# Patient Record
Sex: MALE | Race: WHITE | Employment: OTHER | URBAN - METROPOLITAN AREA
[De-identification: names, ages, dates, MRNs, and addresses within clinical notes are randomized per-mention and may not be internally consistent; named-entity substitution may affect disease eponyms.]

---

## 2017-01-03 ENCOUNTER — LAB ENCOUNTER (OUTPATIENT)
Dept: LAB | Age: 72
End: 2017-01-03
Attending: FAMILY MEDICINE

## 2017-01-03 ENCOUNTER — PRIOR ORIGINAL RECORDS (OUTPATIENT)
Dept: OTHER | Age: 72
End: 2017-01-03

## 2017-01-03 DIAGNOSIS — R73.03 PREDIABETES: ICD-10-CM

## 2017-01-03 DIAGNOSIS — F51.01 PRIMARY INSOMNIA: ICD-10-CM

## 2017-01-03 DIAGNOSIS — R53.82 CHRONIC FATIGUE SYNDROME: ICD-10-CM

## 2017-01-03 DIAGNOSIS — E78.5 HYPERLIPIDEMIA, UNSPECIFIED HYPERLIPIDEMIA TYPE: ICD-10-CM

## 2017-01-03 DIAGNOSIS — M06.9 RHEUMATOID ARTHRITIS, INVOLVING UNSPECIFIED SITE, UNSPECIFIED RHEUMATOID FACTOR PRESENCE: ICD-10-CM

## 2017-01-03 PROBLEM — N40.0 ENLARGED PROSTATE: Status: RESOLVED | Noted: 2017-01-03 | Resolved: 2017-01-03

## 2017-01-03 PROBLEM — G93.32 CHRONIC FATIGUE SYNDROME: Status: ACTIVE | Noted: 2017-01-03

## 2017-01-03 PROBLEM — K58.9 IBS (IRRITABLE BOWEL SYNDROME): Status: ACTIVE | Noted: 2017-01-03

## 2017-01-03 PROBLEM — N40.0 ENLARGED PROSTATE: Status: ACTIVE | Noted: 2017-01-03

## 2017-01-03 PROBLEM — G47.00 INSOMNIA: Status: ACTIVE | Noted: 2017-01-03

## 2017-01-03 PROBLEM — F43.10 PTSD (POST-TRAUMATIC STRESS DISORDER): Status: ACTIVE | Noted: 2017-01-03

## 2017-01-03 PROCEDURE — 80053 COMPREHEN METABOLIC PANEL: CPT

## 2017-01-03 PROCEDURE — 84443 ASSAY THYROID STIM HORMONE: CPT

## 2017-01-03 PROCEDURE — 83721 ASSAY OF BLOOD LIPOPROTEIN: CPT

## 2017-01-03 PROCEDURE — 85025 COMPLETE CBC W/AUTO DIFF WBC: CPT

## 2017-01-03 PROCEDURE — 36415 COLL VENOUS BLD VENIPUNCTURE: CPT

## 2017-01-03 PROCEDURE — 80061 LIPID PANEL: CPT

## 2017-01-03 PROCEDURE — 83036 HEMOGLOBIN GLYCOSYLATED A1C: CPT

## 2017-01-03 NOTE — PROGRESS NOTES
Patient presents with:  Establish Care: Pt just moved into the area and needs new PCP. HPI:   Gopi Sharpe is a 66year old male who presents to the office to establish care. He is a new patient, new to the area (from [facility]).   Was last seen knows he does not have this. HTN - was told he had this, but as far as patient aware, the BP is normal.  Ran out of the BP medicines about 2 weeks ago - (losartan-HCTZ).   Now BP normal, though high normal.  In past when seeing specialists, recalls his BP MANAGEMENT   • Injection, w/wo contrast, dx/therapeutic substance, epidural/subarachnoid; lumbar/sacral  3/11/2014     Procedure: LUMBAR EPIDURAL;  Surgeon: Maribell Rodriguez MD;  Location: [facility] PAIN MANAGEMENT   • Fluor gijayne & brad ndl/cath spi d Seat Belt Yes     Social History Narrative       , lives with wife. 4 adult children - 2 boys 2 girls.    REVIEW OF SYSTEMS:   Constitutional: positive for fatigue  Eyes: negative  Ears, nose, mouth, throat, and face: negative  Respiratory: negat Irritable bowel syndrome with both constipation and diarrhea  On and off. Daily takes Miramax, if symptoms accelerate, takes linzess but this causes diarrhea.     Was seeing Mohsen Begun but no longer wants to see  Given referral for GI  - Gastro Referral - In Net (eight) hours as needed for Pain. Dispense: 90 tablet; Refill: 1  - CBC W Differential W Platelet [E]; Future    9. Prediabetes  Mentioned in the past.  Possible DM? Get Z9N  - Comp Metabolic Panel [E]; Future  - Hemoglobin A1C [E];  Future      Berdine Adam

## 2017-01-04 LAB
ALBUMIN SERPL-MCNC: 4.1 G/DL (ref 3.5–4.8)
ALP LIVER SERPL-CCNC: 86 U/L (ref 45–117)
ALT SERPL-CCNC: 135 U/L (ref 17–63)
AST SERPL-CCNC: 66 U/L (ref 15–41)
BASOPHILS # BLD AUTO: 0.05 X10(3) UL (ref 0–0.1)
BASOPHILS NFR BLD AUTO: 0.9 %
BILIRUB SERPL-MCNC: 0.6 MG/DL (ref 0.1–2)
BUN BLD-MCNC: 14 MG/DL (ref 8–20)
CALCIUM BLD-MCNC: 9.6 MG/DL (ref 8.3–10.3)
CHLORIDE: 107 MMOL/L (ref 101–111)
CHOLEST SMN-MCNC: 191 MG/DL (ref ?–200)
CO2: 28 MMOL/L (ref 22–32)
CREAT BLD-MCNC: 0.97 MG/DL (ref 0.7–1.3)
EOSINOPHIL # BLD AUTO: 0.09 X10(3) UL (ref 0–0.3)
EOSINOPHIL NFR BLD AUTO: 1.7 %
ERYTHROCYTE [DISTWIDTH] IN BLOOD BY AUTOMATED COUNT: 12.5 % (ref 11.5–16)
EST. AVERAGE GLUCOSE BLD GHB EST-MCNC: 105 MG/DL (ref 68–126)
GLUCOSE BLD-MCNC: 111 MG/DL (ref 70–99)
HBA1C MFR BLD HPLC: 5.3 % (ref ?–5.7)
HCT VFR BLD AUTO: 46.4 % (ref 37–53)
HDLC SERPL-MCNC: 33 MG/DL (ref 45–?)
HDLC SERPL: 5.79 {RATIO} (ref ?–4.97)
HGB BLD-MCNC: 16.2 G/DL (ref 13–17)
IMMATURE GRANULOCYTE COUNT: 0.02 X10(3) UL (ref 0–1)
IMMATURE GRANULOCYTE RATIO %: 0.4 %
LDLC SERPL DIRECT ASSAY-MCNC: 97 MG/DL (ref ?–100)
LYMPHOCYTES # BLD AUTO: 1.59 X10(3) UL (ref 0.9–4)
LYMPHOCYTES NFR BLD AUTO: 29.9 %
M PROTEIN MFR SERPL ELPH: 6.9 G/DL (ref 6.1–8.3)
MCH RBC QN AUTO: 34.6 PG (ref 27–33.2)
MCHC RBC AUTO-ENTMCNC: 34.9 G/DL (ref 31–37)
MCV RBC AUTO: 99.1 FL (ref 80–99)
MONOCYTES # BLD AUTO: 0.48 X10(3) UL (ref 0.1–0.6)
MONOCYTES NFR BLD AUTO: 9 %
NEUTROPHIL ABS PRELIM: 3.08 X10 (3) UL (ref 1.3–6.7)
NEUTROPHILS # BLD AUTO: 3.08 X10(3) UL (ref 1.3–6.7)
NEUTROPHILS NFR BLD AUTO: 58.1 %
NONHDLC SERPL-MCNC: 158 MG/DL (ref ?–130)
PLATELET # BLD AUTO: 165 10(3)UL (ref 150–450)
POTASSIUM SERPL-SCNC: 3.9 MMOL/L (ref 3.6–5.1)
RBC # BLD AUTO: 4.68 X10(6)UL (ref 3.8–5.8)
RED CELL DISTRIBUTION WIDTH-SD: 45.1 FL (ref 35.1–46.3)
SODIUM SERPL-SCNC: 142 MMOL/L (ref 136–144)
TRIGLYCERIDES: 443 MG/DL (ref ?–150)
TSI SER-ACNC: 1.48 MIU/ML (ref 0.35–5.5)
WBC # BLD AUTO: 5.3 X10(3) UL (ref 4–13)

## 2017-01-16 ENCOUNTER — TELEPHONE (OUTPATIENT)
Dept: FAMILY MEDICINE CLINIC | Facility: CLINIC | Age: 72
End: 2017-01-16

## 2017-01-16 NOTE — TELEPHONE ENCOUNTER
NGAOM for patient to contact our office and complete the annual health assessment forms prior to his appointment with Dr. Kanwal Maravilla 1/20/17

## 2017-01-20 ENCOUNTER — OFFICE VISIT (OUTPATIENT)
Dept: FAMILY MEDICINE CLINIC | Facility: CLINIC | Age: 72
End: 2017-01-20

## 2017-01-20 ENCOUNTER — TELEPHONE (OUTPATIENT)
Dept: FAMILY MEDICINE CLINIC | Facility: CLINIC | Age: 72
End: 2017-01-20

## 2017-01-20 VITALS
SYSTOLIC BLOOD PRESSURE: 160 MMHG | BODY MASS INDEX: 39.97 KG/M2 | WEIGHT: 269.88 LBS | RESPIRATION RATE: 20 BRPM | HEIGHT: 69 IN | TEMPERATURE: 99 F | HEART RATE: 80 BPM | DIASTOLIC BLOOD PRESSURE: 78 MMHG

## 2017-01-20 DIAGNOSIS — F51.01 PRIMARY INSOMNIA: ICD-10-CM

## 2017-01-20 DIAGNOSIS — K58.2 IRRITABLE BOWEL SYNDROME WITH BOTH CONSTIPATION AND DIARRHEA: ICD-10-CM

## 2017-01-20 DIAGNOSIS — E66.01 SEVERE OBESITY (BMI 35.0-39.9): ICD-10-CM

## 2017-01-20 DIAGNOSIS — M48.061 LUMBAR FORAMINAL STENOSIS: ICD-10-CM

## 2017-01-20 DIAGNOSIS — M06.9 RHEUMATOID ARTHRITIS, INVOLVING UNSPECIFIED SITE, UNSPECIFIED RHEUMATOID FACTOR PRESENCE: ICD-10-CM

## 2017-01-20 DIAGNOSIS — F43.10 PTSD (POST-TRAUMATIC STRESS DISORDER): ICD-10-CM

## 2017-01-20 DIAGNOSIS — R73.03 PREDIABETES: ICD-10-CM

## 2017-01-20 DIAGNOSIS — R53.82 CHRONIC FATIGUE SYNDROME: ICD-10-CM

## 2017-01-20 DIAGNOSIS — Z00.00 ENCOUNTER FOR ANNUAL HEALTH EXAMINATION: Primary | ICD-10-CM

## 2017-01-20 DIAGNOSIS — E78.5 HYPERLIPIDEMIA, UNSPECIFIED HYPERLIPIDEMIA TYPE: ICD-10-CM

## 2017-01-20 DIAGNOSIS — Z98.1 S/P LUMBAR FUSION: ICD-10-CM

## 2017-01-20 PROCEDURE — 96160 PT-FOCUSED HLTH RISK ASSMT: CPT | Performed by: FAMILY MEDICINE

## 2017-01-20 NOTE — PATIENT INSTRUCTIONS
Hang Meza's SCREENING SCHEDULE   Tests on this list are recommended by your physician but may not be covered, or covered at this frequency, by your insurer. Please check with your insurance carrier before scheduling to verify coverage.     PREVEN Cancer Screening Covered up to Age 76     Colonoscopy Screen   Covered every 10 years- more often if abnormal Colonoscopy,10 Years due on 07/09/1993 Update Health Maintenance if applicable    Flex Sigmoidoscopy Screen  Covered every 5 years No results foun visit. This may be covered with your prescription benefits, but Medicare does not cover unless Medically needed    Zoster (Not covered by Medicare Part B) No orders found for this or any previous visit.  This may be covered with your pharmacy  prescription

## 2017-01-20 NOTE — PROGRESS NOTES
HPI:   Heidi Snow is a 66year old male who presents for a MA (Medicare Advantage) [facility] (Once per calendar year). PTSD - still a major problem, nightmare sand night terrors.   Referred to [facility], who likely called him but he could not TSH 1.480 01/03/2017   CREATSERUM 0.97 01/03/2017   * 01/03/2017        CBC  (most recent labs)     Lab Results  Component Value Date   WBC 5.3 01/03/2017   HGB 16.2 01/03/2017   .0 01/03/2017        ALLERGIES:   He has No Known Allergies. surgery (Right); tonsillectomy; and appendectomy.     His family history includes Arthritis in his brother; Dementia in his father; Diabetes in his mother; Heart Disorder in his brother and father; Hypertension in his brother; Lipids in his brother; Sydnee Moreira pupils equal and reactive, extraocular eye movements intact, wearing glasses  Nose - normal and patent, no erythema, discharge or polyps  Mouth - mucous membranes moist, pharynx normal without lesions  Neck - supple, no significant adenopathy  Chest - eric serve his needed. Referral placed. - Weight Loss [address] Location    4. Irritable bowel syndrome with both constipation and diarrhea  Related to moods, PTSD in my suspicion.     See psychiatrist  Also given a referral to see GI 1/3/17, but not int maintain positive mental well-being?: Social Interaction;Puzzles;Games; Visiting Friends    If you are a male age 38-65 or a female age 47-67, do you take aspirin?: Yes    Have you had any immunizations at another office such as Influenza, Hepatitis B, Teta week is this?: Correct    What month is it?: Correct    What year is it?: Correct    Recall \"Ball\": Correct    Recall \"Flag\": Correct    Recall \"Tree\": Correct       This section provided for quick review of chart, separate sheet to patient  Mary Anne Oseguera of Persistent     Medications (ACE/ARB, digoxin diuretics, anticonvulsants.)    Potassium  Annually POTASSIUM (mmol/L)   Date Value   01/03/2017 3.9    No flowsheet data found.     Creatinine  Annually CREATININE (mg/dL)   Date Value   01/03/2017 0.97    No

## 2017-01-23 ENCOUNTER — TELEPHONE (OUTPATIENT)
Dept: FAMILY MEDICINE CLINIC | Facility: CLINIC | Age: 72
End: 2017-01-23

## 2017-01-23 NOTE — TELEPHONE ENCOUNTER
Pt upset he had not heard of plan b for weight loss. i advised him that I have called the weight loss clinic and am waiting for their thoughts on what options we have.  He was able to schedule with [facility]

## 2017-01-23 NOTE — TELEPHONE ENCOUNTER
Please call patient regarding referrals that have not been done for one month. He said he is very ill and deserves a call from Dr. Festus Mayorga not a nurse. He only needs two minutes of his time. See previous notes.  He is very upset and will transfer provider

## 2017-01-26 ENCOUNTER — TELEPHONE (OUTPATIENT)
Dept: INTERNAL MEDICINE CLINIC | Facility: CLINIC | Age: 72
End: 2017-01-26

## 2017-02-06 ENCOUNTER — TELEPHONE (OUTPATIENT)
Dept: FAMILY MEDICINE CLINIC | Facility: CLINIC | Age: 72
End: 2017-02-06

## 2017-02-10 NOTE — TELEPHONE ENCOUNTER
Pt says he tried to follow through with referrals, but Evorn Baumgarten he went in for assessment and spent a whole day there talking with different providers. He states the end result was that they did not have a provider for him.     He is still upset about t

## 2017-02-10 NOTE — TELEPHONE ENCOUNTER
Reviewed below with patient. He states he is unable to get in touch with anyone from [facility].  Pt states he needs to see a psychiatrist. Bryon See pt as to whether he has contacted [facility] to get an appt with a psychiatrist and he states that is not what he needs

## 2017-04-17 ENCOUNTER — TELEPHONE (OUTPATIENT)
Dept: FAMILY MEDICINE CLINIC | Facility: CLINIC | Age: 72
End: 2017-04-17

## 2017-04-17 DIAGNOSIS — H35.9 RETINAL DISORDER: Primary | ICD-10-CM

## 2017-04-17 NOTE — TELEPHONE ENCOUNTER
Patient is calling in to check status on his refills he is out of the medication and wants to know the when it will be approved. I told him it was in pending status.

## 2017-04-17 NOTE — TELEPHONE ENCOUNTER
Patient needs a referral for Dr. Babs Kurtz for tomorrow 4/18/17. Address [address] [place] [address]. He said Dr. Homa Gonzalez needs to call it in. He also needs a referral for for Dr. Miguel Clifton for his Cataracts and Glaucoma for his appt.  With him on Wednesday 4/19/1

## 2017-04-17 NOTE — TELEPHONE ENCOUNTER
This was waiting for PCP to be attached to EPIC chart by [facility], Dr Rosendo Girard is now attached

## 2017-04-17 NOTE — TELEPHONE ENCOUNTER
----- Message -----   Caresse Bence From: Jian Hutchinson Sent: 4/13/2017   1:31 PM        To:  [facility] Clinical Staff, *   Subject: 2 Referral Request                               .Reason for the order/referral:Cataracts   PCP: Teodoro Lamb   Refer to Provider: Asha Saldivar

## 2017-04-18 RX ORDER — ATORVASTATIN CALCIUM 40 MG/1
TABLET, FILM COATED ORAL
Qty: 30 TABLET | Refills: 0 | Status: CANCELLED | OUTPATIENT
Start: 2017-04-18

## 2017-04-18 RX ORDER — ACETAMINOPHEN AND CODEINE PHOSPHATE 120; 12 MG/5ML; MG/5ML
SOLUTION ORAL
Qty: 30 CAPSULE | Refills: 0 | Status: CANCELLED | OUTPATIENT
Start: 2017-04-18

## 2017-04-18 RX ORDER — ALPRAZOLAM 1 MG/1
TABLET ORAL
Qty: 60 TABLET | Refills: 0 | Status: CANCELLED | OUTPATIENT
Start: 2017-04-18

## 2017-04-18 NOTE — TELEPHONE ENCOUNTER
Pt failed refill protocol because Liver enzymes elevated, so Atorvastatin in not suppose to be refilled. Also, Alprazolam and Flurazepam not on protocol. LOV 01/20/17 and labs 01/03/17. Please advise ? Routed to Dr Homa Gonzalez.

## 2017-04-19 NOTE — PATIENT INSTRUCTIONS
:    [address], [facility]  Licensed Clinical Professional Counselor  Child-Adult-Family Therapy    [place] [id number] [place], [place] [address]  Ph: [phone] ext [phone]  Fax: [phone]

## 2017-04-19 NOTE — PROGRESS NOTES
Patient presents with:  Referral  Medication Follow-Up     HPI:   Allyson Velazquez is a 66year old male who presents to the office for PTSD. The last time we met he was still hunting for someone who accepts his workers comp disability package.   Jackie Melendrez Naseem Varner was seen in the office today:  had concerns including Referral and Medication Follow-Up.     1. PTSD (post-traumatic stress disorder)  Ongoing PTSD  Need to find someone who will accept his disability from the [facility] and provide care to fill out forms Dispense: 30 tablet;  Refill: 5

## 2017-04-20 ENCOUNTER — TELEPHONE (OUTPATIENT)
Dept: INTERNAL MEDICINE CLINIC | Facility: CLINIC | Age: 72
End: 2017-04-20

## 2017-04-20 NOTE — TELEPHONE ENCOUNTER
Patient has a NP appt at the [facility] on 4/26/17 , chart has been reviewed by RN.

## 2017-04-26 ENCOUNTER — OFFICE VISIT (OUTPATIENT)
Dept: INTERNAL MEDICINE CLINIC | Facility: CLINIC | Age: 72
End: 2017-04-26

## 2017-04-26 ENCOUNTER — APPOINTMENT (OUTPATIENT)
Dept: LAB | Age: 72
End: 2017-04-26
Attending: NURSE PRACTITIONER
Payer: MEDICARE

## 2017-04-26 VITALS
SYSTOLIC BLOOD PRESSURE: 126 MMHG | HEIGHT: 69 IN | BODY MASS INDEX: 40.58 KG/M2 | HEART RATE: 80 BPM | RESPIRATION RATE: 16 BRPM | WEIGHT: 274 LBS | DIASTOLIC BLOOD PRESSURE: 82 MMHG

## 2017-04-26 DIAGNOSIS — G47.00 INSOMNIA, UNSPECIFIED TYPE: ICD-10-CM

## 2017-04-26 DIAGNOSIS — E66.01 MORBID OBESITY, UNSPECIFIED OBESITY TYPE (HCC): ICD-10-CM

## 2017-04-26 DIAGNOSIS — F43.10 PTSD (POST-TRAUMATIC STRESS DISORDER): ICD-10-CM

## 2017-04-26 DIAGNOSIS — Z51.81 THERAPEUTIC DRUG MONITORING: Primary | ICD-10-CM

## 2017-04-26 DIAGNOSIS — Z51.81 THERAPEUTIC DRUG MONITORING: ICD-10-CM

## 2017-04-26 DIAGNOSIS — G47.33 OSA (OBSTRUCTIVE SLEEP APNEA): ICD-10-CM

## 2017-04-26 PROCEDURE — 99213 OFFICE O/P EST LOW 20 MIN: CPT | Performed by: NURSE PRACTITIONER

## 2017-04-26 PROCEDURE — 82607 VITAMIN B-12: CPT

## 2017-04-26 PROCEDURE — 82306 VITAMIN D 25 HYDROXY: CPT

## 2017-04-26 PROCEDURE — 82397 CHEMILUMINESCENT ASSAY: CPT

## 2017-04-26 RX ORDER — TOPIRAMATE 25 MG/1
25 TABLET ORAL 2 TIMES DAILY
Qty: 60 TABLET | Refills: 0 | Status: SHIPPED | OUTPATIENT
Start: 2017-04-26 | End: 2017-06-20 | Stop reason: ALTCHOICE

## 2017-04-26 NOTE — PROGRESS NOTES
Lorna Camargo is a 66year old male new to our office today. Referred by Dr. Kevin GARCIA:  Patient presents today with c/o weight gain that started about 8 years ago after surgeries for orthopedic injuries which resulted in in activity.   Patient sees 69\"  Wt 274 lb  BMI 40.44 kg/m2  GENERAL: well developed, well nourished, in no apparent distress, morbidly obese  SKIN: warm, pink, dry without rashes to exposed area  EYES: conjunctiva pink, sclera non icteric, PERRLA  HEENT: atraumatic, normocephalic, nutrition, exercise and behavior/stress management for success. See patient instruction below for more details. Patient Instructions   Welcome to the [facility]. ..your Lifestyle Renovation begins now!   Thank you for placing your trust in control stress. Chronic stress can make weight loss difficult. Exercising is one way to help with stress, but meditation using the CALM Paul or another comparable alternative can be done in your home or place of work with little time commitment.     Don't f

## 2017-04-26 NOTE — PATIENT INSTRUCTIONS
Welcome to the [facility] Clinic. ..your Lifestyle Renovation begins now! Thank you for placing your trust in our health care team, I look forward to working with you along this journey to better health!     Next steps:     1.  Schedule a personal n another comparable alternative can be done in your home or place of work with little time commitment. Don't forget to schedule your 1 month follow-up visit!

## 2017-05-01 NOTE — TELEPHONE ENCOUNTER
Pt assigned to another provider Dr Allyn Lockett w/[facility]. Val Us LMOM informing him to contact Insurance and have it changed to our group

## 2017-05-01 NOTE — TELEPHONE ENCOUNTER
Message sent to [facility] Eligibility and pt is active w/Another group. .. not ours

## 2017-05-02 ENCOUNTER — TELEPHONE (OUTPATIENT)
Dept: INTERNAL MEDICINE CLINIC | Facility: CLINIC | Age: 72
End: 2017-05-02

## 2017-05-02 NOTE — TELEPHONE ENCOUNTER
There is not an Explanation of benefits from the insurance for the labs that are being billed from [facility] (see Account Inquiry). The patient should phone the Patient Billing Department at [phone] for further information.   Once it has been

## 2017-05-30 ENCOUNTER — TELEPHONE (OUTPATIENT)
Dept: INTERNAL MEDICINE CLINIC | Facility: CLINIC | Age: 72
End: 2017-05-30

## 2017-06-12 ENCOUNTER — TELEPHONE (OUTPATIENT)
Dept: FAMILY MEDICINE CLINIC | Facility: CLINIC | Age: 72
End: 2017-06-12

## 2017-06-12 NOTE — TELEPHONE ENCOUNTER
Pt requesting an appt for TODAY ONLY w/Dr Kandy Guzman (offered another provider) has Cold like symptoms Fever and nausea

## 2017-06-12 NOTE — TELEPHONE ENCOUNTER
Pt called to report that he made appt to see Dr Kandy Guzman for [facility] 06/14/17 for symptoms, but pt would like to be seen today if at all possible.  Pt has been sick for a few weeks with Bilateral ear pain, cough, sore throat, post nasal drip, sweats, nausea, and

## 2017-06-13 ENCOUNTER — OFFICE VISIT (OUTPATIENT)
Dept: FAMILY MEDICINE CLINIC | Facility: CLINIC | Age: 72
End: 2017-06-13

## 2017-06-13 VITALS
RESPIRATION RATE: 16 BRPM | SYSTOLIC BLOOD PRESSURE: 138 MMHG | BODY MASS INDEX: 40 KG/M2 | WEIGHT: 272 LBS | HEART RATE: 84 BPM | DIASTOLIC BLOOD PRESSURE: 76 MMHG | TEMPERATURE: 98 F

## 2017-06-13 DIAGNOSIS — G47.00 INSOMNIA, UNSPECIFIED TYPE: ICD-10-CM

## 2017-06-13 DIAGNOSIS — R05.9 COUGH: ICD-10-CM

## 2017-06-13 DIAGNOSIS — R61 EXCESSIVE SWEATING: ICD-10-CM

## 2017-06-13 DIAGNOSIS — E78.5 HYPERLIPIDEMIA, UNSPECIFIED HYPERLIPIDEMIA TYPE: ICD-10-CM

## 2017-06-13 DIAGNOSIS — R09.81 NASAL CONGESTION: Primary | ICD-10-CM

## 2017-06-13 DIAGNOSIS — F51.01 PRIMARY INSOMNIA: ICD-10-CM

## 2017-06-13 DIAGNOSIS — F43.10 PTSD (POST-TRAUMATIC STRESS DISORDER): ICD-10-CM

## 2017-06-13 PROCEDURE — 99213 OFFICE O/P EST LOW 20 MIN: CPT | Performed by: FAMILY MEDICINE

## 2017-06-13 RX ORDER — ATORVASTATIN CALCIUM 40 MG/1
40 TABLET, FILM COATED ORAL NIGHTLY
Qty: 30 TABLET | Refills: 5 | Status: SHIPPED | OUTPATIENT
Start: 2017-06-13 | End: 2017-10-06

## 2017-06-13 RX ORDER — ALPRAZOLAM 1 MG/1
1 TABLET ORAL 2 TIMES DAILY PRN
Qty: 40 TABLET | Refills: 2 | Status: SHIPPED | OUTPATIENT
Start: 2017-06-13 | End: 2017-10-06

## 2017-06-13 RX ORDER — AZITHROMYCIN 250 MG/1
TABLET, FILM COATED ORAL
Qty: 6 TABLET | Refills: 0 | Status: SHIPPED | OUTPATIENT
Start: 2017-06-13 | End: 2017-06-20 | Stop reason: ALTCHOICE

## 2017-06-13 RX ORDER — ACETAMINOPHEN AND CODEINE PHOSPHATE 120; 12 MG/5ML; MG/5ML
30 SOLUTION ORAL NIGHTLY PRN
Qty: 30 CAPSULE | Refills: 2 | Status: SHIPPED | OUTPATIENT
Start: 2017-06-13 | End: 2017-10-06

## 2017-06-13 NOTE — PROGRESS NOTES
Patient presents with:  Cold: x3 weeks w/ on/off nasal congestion, feeling ears are clogged and feels like he has a fever. HPI:   Laney Reyes is a 66year old male with PTSD, lumbar issues who presents to the office for cold symptoms.      Weigh tablets by mouth today, then one daily. Dispense: 6 tablet; Refill: 0    2. Cough  As above  - azithromycin 250 MG Oral Tab; Take two tablets by mouth today, then one daily. Dispense: 6 tablet; Refill: 0    3.  Excessive sweating  As above  - azithromycin

## 2017-06-20 ENCOUNTER — OFFICE VISIT (OUTPATIENT)
Dept: FAMILY MEDICINE CLINIC | Facility: CLINIC | Age: 72
End: 2017-06-20

## 2017-06-20 VITALS
HEART RATE: 90 BPM | TEMPERATURE: 99 F | RESPIRATION RATE: 18 BRPM | BODY MASS INDEX: 40 KG/M2 | DIASTOLIC BLOOD PRESSURE: 76 MMHG | SYSTOLIC BLOOD PRESSURE: 128 MMHG | WEIGHT: 270 LBS

## 2017-06-20 DIAGNOSIS — R05.9 COUGH: Primary | ICD-10-CM

## 2017-06-20 DIAGNOSIS — R09.81 NASAL CONGESTION: ICD-10-CM

## 2017-06-20 PROCEDURE — 99213 OFFICE O/P EST LOW 20 MIN: CPT | Performed by: FAMILY MEDICINE

## 2017-06-20 NOTE — PROGRESS NOTES
Patient presents with:  Sore Throat: Pt states still having sore throat ear pain, and congestion. HPI:   Chela Steven is a 66year old male who presents to the office for pain in throat, cough. Persisting. We saw each other a week ago.   Given

## 2017-08-29 ENCOUNTER — OFFICE VISIT (OUTPATIENT)
Dept: FAMILY MEDICINE CLINIC | Facility: CLINIC | Age: 72
End: 2017-08-29

## 2017-08-29 VITALS
DIASTOLIC BLOOD PRESSURE: 84 MMHG | RESPIRATION RATE: 18 BRPM | BODY MASS INDEX: 41 KG/M2 | HEART RATE: 82 BPM | TEMPERATURE: 98 F | SYSTOLIC BLOOD PRESSURE: 144 MMHG | WEIGHT: 279 LBS

## 2017-08-29 DIAGNOSIS — G47.00 INSOMNIA, UNSPECIFIED TYPE: ICD-10-CM

## 2017-08-29 DIAGNOSIS — F43.10 PTSD (POST-TRAUMATIC STRESS DISORDER): ICD-10-CM

## 2017-08-29 DIAGNOSIS — R09.81 NASAL CONGESTION: ICD-10-CM

## 2017-08-29 DIAGNOSIS — E66.01 MORBID OBESITY WITH BMI OF 40.0-44.9, ADULT (HCC): Primary | ICD-10-CM

## 2017-08-29 PROCEDURE — 99214 OFFICE O/P EST MOD 30 MIN: CPT | Performed by: FAMILY MEDICINE

## 2017-08-29 NOTE — PROGRESS NOTES
Patient presents with:  Weight Loss: Pt would like to know about weight loss clinic  Nasal Congestion: Pt states he is still having congestion in left ear   Colonoscopy Screening: pt is due     HPI:   Katrin Keys is a 74year old male who presents t clubbing or cyanosis  Skin - normal coloration and turgor, no rashes, no suspicious skin lesions noted  No rash seen on back of head.   ASSESSMENT AND PLAN:     Randa Severance was seen in the office today:  had concerns including Weight Loss (Pt would l

## 2017-08-30 ENCOUNTER — TELEPHONE (OUTPATIENT)
Dept: FAMILY MEDICINE CLINIC | Facility: CLINIC | Age: 72
End: 2017-08-30

## 2017-08-30 NOTE — TELEPHONE ENCOUNTER
Called and LMOM at Dr Piedad Griffin office to inquire if they medically manage weight loss for patient's per Dr Vane Gaston request

## 2017-08-30 NOTE — TELEPHONE ENCOUNTER
Called Dr Valentino Poag office and spoke to [place], a Consultation Appt was scheduled for Nov. 2nd at 10:15am, Called patient and left message on phone machine with appt details  Referral has been entered by Dr Hannah Holt, Clinical information was added by me today.

## 2017-10-05 ENCOUNTER — TELEPHONE (OUTPATIENT)
Dept: FAMILY MEDICINE CLINIC | Facility: CLINIC | Age: 72
End: 2017-10-05

## 2017-10-05 NOTE — TELEPHONE ENCOUNTER
Has had this pain in Left side of abdomin no N/V/D has long standing HX of constipation Last BM was 2 days ago feels he needs to have BM will only see Dr Teodoro Lamb made an appointment for 14:45 10/6/17 with Dr Teodoro Lamb

## 2017-10-05 NOTE — TELEPHONE ENCOUNTER
Pt has a sharp pain for about 3 days in the lower left side of abdomen he only wants to see Dr Fredo Silva and he cant come tomorrow morning

## 2017-10-06 ENCOUNTER — HOSPITAL ENCOUNTER (OUTPATIENT)
Dept: CT IMAGING | Facility: HOSPITAL | Age: 72
Discharge: HOME | End: 2017-10-06
Attending: FAMILY MEDICINE
Payer: MEDICARE

## 2017-10-06 ENCOUNTER — OFFICE VISIT (OUTPATIENT)
Dept: FAMILY MEDICINE CLINIC | Facility: CLINIC | Age: 72
End: 2017-10-06

## 2017-10-06 VITALS
SYSTOLIC BLOOD PRESSURE: 136 MMHG | RESPIRATION RATE: 16 BRPM | BODY MASS INDEX: 39.87 KG/M2 | HEIGHT: 69.5 IN | WEIGHT: 275.38 LBS | OXYGEN SATURATION: 94 % | TEMPERATURE: 98 F | DIASTOLIC BLOOD PRESSURE: 82 MMHG | HEART RATE: 82 BPM

## 2017-10-06 DIAGNOSIS — R06.00 DYSPNEA ON EXERTION: ICD-10-CM

## 2017-10-06 DIAGNOSIS — K57.32 DIVERTICULITIS OF LARGE INTESTINE WITHOUT PERFORATION OR ABSCESS WITHOUT BLEEDING: ICD-10-CM

## 2017-10-06 DIAGNOSIS — R07.9 CHEST PAIN, UNSPECIFIED TYPE: ICD-10-CM

## 2017-10-06 DIAGNOSIS — H92.01 EAR PAIN, RIGHT: ICD-10-CM

## 2017-10-06 DIAGNOSIS — R09.02 HYPOXIA: ICD-10-CM

## 2017-10-06 DIAGNOSIS — I70.0 THORACIC AORTA ATHEROSCLEROSIS (HCC): ICD-10-CM

## 2017-10-06 DIAGNOSIS — F43.10 PTSD (POST-TRAUMATIC STRESS DISORDER): ICD-10-CM

## 2017-10-06 DIAGNOSIS — R10.32 ABDOMINAL PAIN, ACUTE, LEFT LOWER QUADRANT: ICD-10-CM

## 2017-10-06 DIAGNOSIS — L73.8 FOLLICULITIS BARBAE: ICD-10-CM

## 2017-10-06 DIAGNOSIS — R10.32 ABDOMINAL PAIN, ACUTE, LEFT LOWER QUADRANT: Primary | ICD-10-CM

## 2017-10-06 DIAGNOSIS — E78.5 HYPERLIPIDEMIA, UNSPECIFIED HYPERLIPIDEMIA TYPE: ICD-10-CM

## 2017-10-06 DIAGNOSIS — I77.811 ECTATIC ABDOMINAL AORTA (HCC): ICD-10-CM

## 2017-10-06 DIAGNOSIS — F51.01 PRIMARY INSOMNIA: ICD-10-CM

## 2017-10-06 DIAGNOSIS — E66.01 MORBID OBESITY WITH BMI OF 40.0-44.9, ADULT (HCC): ICD-10-CM

## 2017-10-06 DIAGNOSIS — R61 EXCESSIVE SWEATING: ICD-10-CM

## 2017-10-06 DIAGNOSIS — R91.8 PULMONARY NODULES: ICD-10-CM

## 2017-10-06 PROCEDURE — 71260 CT THORAX DX C+: CPT | Performed by: FAMILY MEDICINE

## 2017-10-06 PROCEDURE — 74177 CT ABD & PELVIS W/CONTRAST: CPT | Performed by: FAMILY MEDICINE

## 2017-10-06 PROCEDURE — 99215 OFFICE O/P EST HI 40 MIN: CPT | Performed by: FAMILY MEDICINE

## 2017-10-06 PROCEDURE — 93000 ELECTROCARDIOGRAM COMPLETE: CPT | Performed by: FAMILY MEDICINE

## 2017-10-06 RX ORDER — FLUCONAZOLE 150 MG/1
150 TABLET ORAL DAILY
Qty: 7 TABLET | Refills: 0 | Status: SHIPPED | OUTPATIENT
Start: 2017-10-06 | End: 2017-10-13

## 2017-10-06 RX ORDER — ATORVASTATIN CALCIUM 40 MG/1
40 TABLET, FILM COATED ORAL NIGHTLY
Qty: 30 TABLET | Refills: 5 | Status: SHIPPED | OUTPATIENT
Start: 2017-10-06 | End: 2018-03-14

## 2017-10-06 RX ORDER — ALPRAZOLAM 1 MG/1
1 TABLET ORAL 2 TIMES DAILY PRN
Qty: 40 TABLET | Refills: 2 | Status: SHIPPED | OUTPATIENT
Start: 2017-10-06 | End: 2018-03-22

## 2017-10-06 RX ORDER — CIPROFLOXACIN 500 MG/1
500 TABLET, FILM COATED ORAL 2 TIMES DAILY
Qty: 14 TABLET | Refills: 0 | Status: SHIPPED | OUTPATIENT
Start: 2017-10-06 | End: 2017-10-13

## 2017-10-06 RX ORDER — METRONIDAZOLE 500 MG/1
500 TABLET ORAL 3 TIMES DAILY
Qty: 21 TABLET | Refills: 0 | Status: SHIPPED | OUTPATIENT
Start: 2017-10-06 | End: 2017-10-13

## 2017-10-06 RX ORDER — ACETAMINOPHEN AND CODEINE PHOSPHATE 120; 12 MG/5ML; MG/5ML
30 SOLUTION ORAL NIGHTLY PRN
Qty: 30 CAPSULE | Refills: 2 | Status: SHIPPED | OUTPATIENT
Start: 2017-10-06 | End: 2018-03-22

## 2017-10-06 RX ORDER — NEOMYCIN SULFATE, POLYMYXIN B SULFATE, HYDROCORTISONE 3.5; 10000; 1 MG/ML; [USP'U]/ML; MG/ML
4 SOLUTION/ DROPS AURICULAR (OTIC) 3 TIMES DAILY
Qty: 1 BOTTLE | Refills: 0 | Status: SHIPPED | OUTPATIENT
Start: 2017-10-06 | End: 2017-10-13

## 2017-10-06 NOTE — PROGRESS NOTES
Patient presents with:  Constipation: left side pain x 2 days      HPI:   Emery Boyer is a 74year old male who presents to the office for constipation. This has caused L side pain for the last 2 days. Not feeling well at all.     Since the last ti No current facility-administered medications on file prior to visit.      Allergies:  No Known Allergies    Past Surgical History:   Procedure Laterality Date   • Appendectomy      as child   • Back surgery      twice   • Fluor gid & loclzj ndl/cath sp 4.2 oz/week    7 Standard drinks or equivalent per week         Comment: 1 drink daily    Drug use: No    Sexual activity: Not on file     Other Topics Concern    Caffeine Concern Yes    Comment: weekly    Exercise No    Seat Belt Yes     Social History Na Normal axis and intervals. No ischemic changes.      Component      Latest Ref Rng & Units 4/26/2017 1/3/2017   WBC      4.0 - 13.0 x10(3) uL  5.3   RBC      3.80 - 5.80 x10(6)uL  4.68   Hemoglobin      13.0 - 17.0 g/dL  16.2   Hematocrit      37.0 - 53.0 ESTIMATED AVERAGE GLUCOSE      68 - 126 mg/dL  105   TSH      0.350 - 5.500 mIU/mL  1.480   DIRECT LDL      <100 mg/dL  97   LEPTIN      0.5 - 12.5 ng/mL 22.2 (H)    Vitamin B12      193 - 986 pg/mL 800    VITAMIN D, 25-HYDROXY      30.0 - 100.0 ng/mL 20 shape, triathlons, marathons. CT chest.   - CT CHEST+ABDOMEN (ALL CNTRST ONLY) (HIY=[id number]); Future    9. PTSD (post-traumatic stress disorder)  Long standing  Has been on treatment many years.   - ALPRAZolam 1 MG Oral Tab;  Take 1 tablet (1 mg tota point. Advised to f/u CT in 1 yr. Will recheck 2018. 14. Vascular  Atherosclerotic changes noted on the aorta in chest.  Also noted mild ectasia of the infrarenal abdominal aorta. Risk factor control - on statin.   BP controlled  Is working on [employer]

## 2017-10-12 ENCOUNTER — TELEPHONE (OUTPATIENT)
Dept: FAMILY MEDICINE CLINIC | Facility: CLINIC | Age: 72
End: 2017-10-12

## 2017-10-12 DIAGNOSIS — L73.8 FOLLICULITIS BARBAE: ICD-10-CM

## 2017-10-12 DIAGNOSIS — H92.01 EAR PAIN, RIGHT: Primary | ICD-10-CM

## 2017-10-12 NOTE — TELEPHONE ENCOUNTER
Patient would like to speak to Dr. Jennifer Fong regarding ongoing abdominal pan, rash, and ear pain. Patient is close to running out of meds and would like to know if he should get a refill or what to do. Please contact patient.

## 2017-10-13 RX ORDER — MUPIROCIN CALCIUM 20 MG/G
1 CREAM TOPICAL DAILY
Qty: 15 G | Refills: 1 | Status: SHIPPED | OUTPATIENT
Start: 2017-10-13 | End: 2017-10-20

## 2017-10-13 RX ORDER — NEOMYCIN SULFATE, POLYMYXIN B SULFATE, HYDROCORTISONE 3.5; 10000; 1 MG/ML; [USP'U]/ML; MG/ML
4 SOLUTION/ DROPS AURICULAR (OTIC) 3 TIMES DAILY
Qty: 1 BOTTLE | Refills: 0 | Status: SHIPPED | OUTPATIENT
Start: 2017-10-13 | End: 2018-01-02

## 2017-10-13 NOTE — TELEPHONE ENCOUNTER
Called patient. Despite CT findings and treatment, the abdominal pains persist, but has gotten a lot better. Has also been able to follow a more liquid diet. Ear - has improved, but out of ear drops. Will Rx more. Rash - on face.   Might be improved

## 2017-10-19 ENCOUNTER — MED REC SCAN ONLY (OUTPATIENT)
Dept: FAMILY MEDICINE CLINIC | Facility: CLINIC | Age: 72
End: 2017-10-19

## 2017-10-20 ENCOUNTER — OFFICE VISIT (OUTPATIENT)
Dept: FAMILY MEDICINE CLINIC | Facility: CLINIC | Age: 72
End: 2017-10-20

## 2017-10-20 VITALS
SYSTOLIC BLOOD PRESSURE: 136 MMHG | HEIGHT: 69.25 IN | HEART RATE: 60 BPM | WEIGHT: 272.38 LBS | DIASTOLIC BLOOD PRESSURE: 82 MMHG | RESPIRATION RATE: 16 BRPM | TEMPERATURE: 99 F | BODY MASS INDEX: 39.89 KG/M2

## 2017-10-20 DIAGNOSIS — IMO0001 LOW SEXUAL DESIRE DISORDER: ICD-10-CM

## 2017-10-20 DIAGNOSIS — R63.5 WEIGHT GAIN: ICD-10-CM

## 2017-10-20 DIAGNOSIS — R53.82 CHRONIC FATIGUE SYNDROME: Primary | ICD-10-CM

## 2017-10-20 DIAGNOSIS — R61 EXCESSIVE SWEATING: ICD-10-CM

## 2017-10-20 PROCEDURE — 99214 OFFICE O/P EST MOD 30 MIN: CPT | Performed by: FAMILY MEDICINE

## 2017-10-20 RX ORDER — FLURAZEPAM HCL 15 MG
CAPSULE ORAL
Refills: 0 | COMMUNITY
Start: 2017-08-28 | End: 2017-10-20 | Stop reason: ALTCHOICE

## 2017-10-20 RX ORDER — GABAPENTIN 300 MG/1
CAPSULE ORAL
Refills: 0 | COMMUNITY
Start: 2017-09-03 | End: 2018-01-02

## 2017-10-20 NOTE — PROGRESS NOTES
Patient presents with:  Bruising: injection site from 2 weeks ago also painful      HPI:   Antonieta Escalante is a 74year old male who presents to the office for f/u.  2 weeks ago seen for chills, sweat, LOMAX. CT ab and chest showed diverticulitis.   This fatigue syndrome  - TESTOSTERONE TOTAL  - PROLACTIN  - CBC, PLATELET; NO DIFFERENTIAL; Future    2.  Excessive sweating  - CORTISOL  - COMP METABOLIC PANEL (14)  - ALDOSTERONE, SERUM    3. Low sexual desire disorder  - TESTOSTERONE TOTAL  - ALDOSTERONE, SER

## 2017-10-24 ENCOUNTER — APPOINTMENT (OUTPATIENT)
Dept: LAB | Age: 72
End: 2017-10-24
Attending: FAMILY MEDICINE
Payer: MEDICARE

## 2017-10-24 ENCOUNTER — PRIOR ORIGINAL RECORDS (OUTPATIENT)
Dept: OTHER | Age: 72
End: 2017-10-24

## 2017-10-24 DIAGNOSIS — R53.82 CHRONIC FATIGUE SYNDROME: ICD-10-CM

## 2017-10-24 PROCEDURE — 82088 ASSAY OF ALDOSTERONE: CPT | Performed by: FAMILY MEDICINE

## 2017-10-24 PROCEDURE — 85027 COMPLETE CBC AUTOMATED: CPT

## 2017-11-07 ENCOUNTER — TELEPHONE (OUTPATIENT)
Dept: FAMILY MEDICINE CLINIC | Facility: CLINIC | Age: 72
End: 2017-11-07

## 2017-11-07 NOTE — TELEPHONE ENCOUNTER
He has known about the lab results for about a week and a half now. This is nothing acute. The liver levels are pretty consistent with where they have been the last few checks, a little higher.   I was under the impression there was an early Nov appt with

## 2017-11-07 NOTE — TELEPHONE ENCOUNTER
patient called for labs given results including elevated liver function, he is very concerned about weight gain and fatigue with inability to get out of bed for days increased sweating to the point that at night he needs to change the sheets he would like

## 2017-11-16 ENCOUNTER — TELEPHONE (OUTPATIENT)
Dept: FAMILY MEDICINE CLINIC | Facility: CLINIC | Age: 72
End: 2017-11-16

## 2017-11-16 DIAGNOSIS — E66.01 SEVERE OBESITY (BMI 35.0-39.9): Primary | ICD-10-CM

## 2017-11-16 NOTE — TELEPHONE ENCOUNTER
Patient called stating that he needs a new referral for weight loss clinic Dr. Maurice Alcocer. Per Dr. Kirsten Craig the referral is only good for 3 months. Patient apt is scheduled for December 6, 2017 new referral is needed. Order placed.

## 2017-12-13 ENCOUNTER — TELEPHONE (OUTPATIENT)
Dept: FAMILY MEDICINE CLINIC | Facility: CLINIC | Age: 72
End: 2017-12-13

## 2017-12-13 ENCOUNTER — TELEPHONE (OUTPATIENT)
Dept: SURGERY | Facility: CLINIC | Age: 72
End: 2017-12-13

## 2017-12-13 NOTE — TELEPHONE ENCOUNTER
Rigo La arrived for his appointment. When he was taken in to exam room by PCT, he asked that he speak to me before any history was taken or vitals were performed. I spoke with Rigo La who had many concerns regarding insurance coverage.  He questioned wh

## 2017-12-13 NOTE — TELEPHONE ENCOUNTER
Spoke with Patient via telephone to inform him that I spoke with Dr. Fredo Silva and a message was sent to [facility] asking her to call him to discuss insurance. Patient verbalized understanding.

## 2017-12-13 NOTE — TELEPHONE ENCOUNTER
Called and discussed with Leti Joya. The surgeons are not contracted with his insurance. He did see bariatric specialist nurse, but he was not interested in pursuing ongoing care with her.     Leti Joya will have her insurance contacts continue to research

## 2017-12-13 NOTE — TELEPHONE ENCOUNTER
Gaurav Koroma is a Nurse practioner calling from the [facility] office in regards to her recent visit with patient. Patient is wanting bariatric surgery, surgeons he is requesting are not contracted in his insurance.  Patient does not want to see her eithe

## 2017-12-14 ENCOUNTER — TELEPHONE (OUTPATIENT)
Dept: FAMILY MEDICINE CLINIC | Facility: CLINIC | Age: 72
End: 2017-12-14

## 2017-12-14 NOTE — TELEPHONE ENCOUNTER
The bariatric nurse contacted me. They are looking into other options within the network. He is interested in bariatric surgery, not weight loss management.

## 2017-12-15 ENCOUNTER — TELEPHONE (OUTPATIENT)
Dept: SURGERY | Facility: CLINIC | Age: 72
End: 2017-12-15

## 2017-12-15 NOTE — TELEPHONE ENCOUNTER
Per the request of Keaton Abreu, I called patient to discuss concerns regarding insurance covering APN visit and Bariatric surgeons covered by his plan. Left message on patient's voicemail to please call back at his convenience.

## 2017-12-18 ENCOUNTER — TELEPHONE (OUTPATIENT)
Dept: FAMILY MEDICINE CLINIC | Facility: CLINIC | Age: 72
End: 2017-12-18

## 2017-12-18 NOTE — TELEPHONE ENCOUNTER
Working on finding referral for patient in network. I am aware on the [facility] [facility] of in network MDs there is currently no bariatric surgeon.   A general surgeon who does bariatric Vy Ratna) is on the list, but he is not covered in terms of seguni

## 2017-12-20 NOTE — TELEPHONE ENCOUNTER
Spoke with Rozella Goodpasture, at [facility], she is aware that Dr Ashley Smith and Jarvis Alvarez are not in network and directed up to submit a prior authorization through [address]   I have accessed this site through [employer] and will submit a Prior Authori

## 2017-12-20 NOTE — TELEPHONE ENCOUNTER
[facility]  [facility]  [address] [facility]  Phone: [phone]  Fax: [phone]    Called office [facility] for Artemio Flores to call Martir Ogden

## 2018-01-02 ENCOUNTER — OFFICE VISIT (OUTPATIENT)
Dept: FAMILY MEDICINE CLINIC | Facility: CLINIC | Age: 73
End: 2018-01-02

## 2018-01-02 VITALS
WEIGHT: 280 LBS | BODY MASS INDEX: 40.09 KG/M2 | DIASTOLIC BLOOD PRESSURE: 80 MMHG | HEIGHT: 70 IN | TEMPERATURE: 98 F | SYSTOLIC BLOOD PRESSURE: 130 MMHG | HEART RATE: 84 BPM | RESPIRATION RATE: 16 BRPM

## 2018-01-02 DIAGNOSIS — E66.01 MORBID OBESITY WITH BMI OF 40.0-44.9, ADULT (HCC): Primary | ICD-10-CM

## 2018-01-02 DIAGNOSIS — Z23 NEEDS FLU SHOT: ICD-10-CM

## 2018-01-02 PROBLEM — Z71.89 ENCOUNTER FOR PRE-BARIATRIC SURGERY COUNSELING AND EDUCATION: Status: ACTIVE | Noted: 2018-01-02

## 2018-01-02 PROCEDURE — 99213 OFFICE O/P EST LOW 20 MIN: CPT | Performed by: FAMILY MEDICINE

## 2018-01-02 PROCEDURE — G0008 ADMIN INFLUENZA VIRUS VAC: HCPCS | Performed by: FAMILY MEDICINE

## 2018-01-02 PROCEDURE — 90653 IIV ADJUVANT VACCINE IM: CPT | Performed by: FAMILY MEDICINE

## 2018-01-02 NOTE — PROGRESS NOTES
Patient presents with:  Procedure Follow Up: been a year since bariatric surgery, pt here to follow up  Referral: needs referral to bariatric [facility] [place]     HPI:   Delfina Ogden is a 74year old male who presents to the office to discuss bar today:  had concerns including Procedure Follow Up (been a year since bariatric surgery, pt here to follow up) and Referral (needs referral to bariatric surgeoun in [place]). 1. Morbid Obesity with BMI 40.0-44.9, adult [facility])  Referral placed.   Will hav

## 2018-01-02 NOTE — TELEPHONE ENCOUNTER
Tried twice to enter referral through [employer], however was stopped at [employer]# and Surgical Referral heading, called [employer] and was on hold for over 20 min, left message for them to call me back, never received a call back, tried again today, on an exten

## 2018-01-08 ENCOUNTER — HOSPITAL ENCOUNTER (OUTPATIENT)
Dept: GENERAL RADIOLOGY | Facility: HOSPITAL | Age: 73
Discharge: HOME | End: 2018-01-08
Attending: FAMILY MEDICINE
Payer: MEDICARE

## 2018-01-08 ENCOUNTER — TELEPHONE (OUTPATIENT)
Dept: FAMILY MEDICINE CLINIC | Facility: CLINIC | Age: 73
End: 2018-01-08

## 2018-01-08 ENCOUNTER — OFFICE VISIT (OUTPATIENT)
Dept: FAMILY MEDICINE CLINIC | Facility: CLINIC | Age: 73
End: 2018-01-08

## 2018-01-08 VITALS
BODY MASS INDEX: 38.8 KG/M2 | TEMPERATURE: 98 F | HEART RATE: 72 BPM | DIASTOLIC BLOOD PRESSURE: 70 MMHG | HEIGHT: 70 IN | RESPIRATION RATE: 16 BRPM | WEIGHT: 271 LBS | SYSTOLIC BLOOD PRESSURE: 120 MMHG

## 2018-01-08 DIAGNOSIS — R05.9 COUGHING: Primary | ICD-10-CM

## 2018-01-08 DIAGNOSIS — R05.9 COUGHING: ICD-10-CM

## 2018-01-08 DIAGNOSIS — R09.89 CHEST CONGESTION: ICD-10-CM

## 2018-01-08 PROCEDURE — 71046 X-RAY EXAM CHEST 2 VIEWS: CPT | Performed by: FAMILY MEDICINE

## 2018-01-08 PROCEDURE — 99213 OFFICE O/P EST LOW 20 MIN: CPT | Performed by: FAMILY MEDICINE

## 2018-01-08 RX ORDER — LEVOFLOXACIN 750 MG/1
750 TABLET ORAL DAILY
Qty: 7 TABLET | Refills: 0 | Status: SHIPPED | OUTPATIENT
Start: 2018-01-08 | End: 2018-01-15

## 2018-01-08 RX ORDER — ALBUTEROL SULFATE 90 UG/1
2 AEROSOL, METERED RESPIRATORY (INHALATION) EVERY 4 HOURS PRN
Qty: 1 INHALER | Refills: 2 | Status: SHIPPED | OUTPATIENT
Start: 2018-01-08 | End: 2018-03-27

## 2018-01-08 NOTE — PROGRESS NOTES
Patient presents with:  Chest Congestion: chest congestion, wheezing, feels difficulty breathing  Ear Problem: requesting refill on neomycin     HPI:   Senthil Harrington is a 74year old male who presents to the office for cough, congestion.   Has been hav

## 2018-01-08 NOTE — TELEPHONE ENCOUNTER
Awaiting Dr Valeriy Cruz response on this. Pt reports that he started getting sick 01/03/18 with Lung Congestion and coughing up reddish sputum. Pt also reports body aches.  Pt states he fells the same way he did last year when he was diagnosed with Pneumonia a

## 2018-01-08 NOTE — PATIENT INSTRUCTIONS
For treatment, will start:    -Levofloxacin antibiotics  -proair - inhaler - takes three to four times a day  -Mucinex - to help thin out the mucus so it can be coughed up better.

## 2018-01-08 NOTE — TELEPHONE ENCOUNTER
Patient has an appt with the Bariatric Doctor 1/10/18 at 4 pm he said he has rescheduled twice now with Dr. Juan C Hughes. [facility] [place]. Please call he is very concerned. He just saw Dr. Barry England last Tuesday and they discussed it.  Please

## 2018-01-08 NOTE — TELEPHONE ENCOUNTER
With symptoms would start with a CXR. Indication cough. Depending on the outcome would consider antibiotics. Red sputum could mean blood tinged or possible legionnaires, but no known exposure for the latter.

## 2018-01-09 NOTE — TELEPHONE ENCOUNTER
Please see previous TE  We have been in contact with [facility]/[place] and they are making arrangements for this patient to stay in-network and see the Barriatric Surgeons. This should be finalized tomorrow.

## 2018-01-10 NOTE — TELEPHONE ENCOUNTER
Elliott from [facility]/[place] stated the referral requested for Dr Aleja Boyer, [facility] bariatric surgeon, will NOT be approved.   Called patient's home/cell number and left a detailed message, I also requested Valeriy Grant call me back to discuss the approval for the Rashad Rico

## 2018-01-19 NOTE — TELEPHONE ENCOUNTER
Patient called today, we had a long pleasant conversation regarding the acceptance of his [address] insurance at the offices of Dr Sarah Bermudez and Dr Dori Harrington [address] AND [facility], I explained that their office is aware and should now

## 2018-01-19 NOTE — TELEPHONE ENCOUNTER
Patient call back stated he was turned away by Bernadette Arboleda citing his insurance was not accepted  I call the office back, spoke with Andrew Hand who reiterated the non-acceptance of [address], I asked her to look at the approve referral from [facility] in [place], she repeated

## 2018-01-30 ENCOUNTER — TELEPHONE (OUTPATIENT)
Dept: FAMILY MEDICINE CLINIC | Facility: CLINIC | Age: 73
End: 2018-01-30

## 2018-01-30 ENCOUNTER — TELEPHONE (OUTPATIENT)
Dept: SURGERY | Facility: CLINIC | Age: 73
End: 2018-01-30

## 2018-01-30 ENCOUNTER — OFFICE VISIT (OUTPATIENT)
Dept: SURGERY | Facility: CLINIC | Age: 73
End: 2018-01-30

## 2018-01-30 VITALS
DIASTOLIC BLOOD PRESSURE: 87 MMHG | SYSTOLIC BLOOD PRESSURE: 159 MMHG | HEIGHT: 68.5 IN | OXYGEN SATURATION: 94 % | RESPIRATION RATE: 16 BRPM | BODY MASS INDEX: 41.95 KG/M2 | HEART RATE: 74 BPM | WEIGHT: 280 LBS

## 2018-01-30 DIAGNOSIS — Z01.811 PRE-OPERATIVE RESPIRATORY EXAMINATION: ICD-10-CM

## 2018-01-30 DIAGNOSIS — R91.8 PULMONARY NODULES: ICD-10-CM

## 2018-01-30 DIAGNOSIS — Z00.8 PSYCHOLOGICAL ASSESSMENT: ICD-10-CM

## 2018-01-30 DIAGNOSIS — Z01.810 PRE-OPERATIVE CARDIOVASCULAR EXAMINATION: ICD-10-CM

## 2018-01-30 DIAGNOSIS — Z00.8 NUTRITIONAL ASSESSMENT: ICD-10-CM

## 2018-01-30 DIAGNOSIS — Z01.818 PRE-OP EVALUATION: Primary | ICD-10-CM

## 2018-01-30 NOTE — TELEPHONE ENCOUNTER
Pt is request 4 different referrals  Initial Nutrition Evaluation - has appt 2/7/18 with Gumaro Dale RN at [place] [address]  Pulmonologist - Not seen in the past   Cardiologist - Not seen in the past  Clinical Psychologist - has appt 2/15/18 with Dr Rolan maynard

## 2018-01-30 NOTE — H&P
[address] [place] [address]  Dept: [phone]    1/30/2018  Bariatric New Patient Evaluation    Chief Complaint:  obesity     History of Present Illness:  Olivia Whipple ANY*  3/11/2014: PATIENT DOCUMENTED NOT TO HAVE EXPERIENCED ANY*      Comment: Procedure: LUMBAR EPIDURAL;  Surgeon: Anais Palacios MD;  Location: [address] [place] [address]                MANAGEMENT  1/29/2014: PATIENT 1527 Radha MG Oral Tab, Take 1 tablet (1 mg total) by mouth 2 (two) times daily as needed for Sleep., Disp: 40 tablet, Rfl: 2  •  Flurazepam HCl 30 MG Oral Cap, Take 1 capsule (30 mg total) by mouth nightly as needed for Sleep., Disp: 30 capsule, Rfl: 2  •  atorvasta Talked about the different operations available and the relative strengths and weaknesses. Because the patient essentially has no comorbidities I would likely try to minimize risks and in that regard recommended a sleeve gastrectomy.   He does not suffer f

## 2018-02-05 RX ORDER — LEVOFLOXACIN 750 MG/1
TABLET ORAL
Refills: 0 | COMMUNITY
Start: 2018-01-08 | End: 2018-02-22 | Stop reason: ALTCHOICE

## 2018-02-12 ENCOUNTER — MYAURORA ACCOUNT LINK (OUTPATIENT)
Dept: OTHER | Age: 73
End: 2018-02-12

## 2018-02-12 ENCOUNTER — PRIOR ORIGINAL RECORDS (OUTPATIENT)
Dept: OTHER | Age: 73
End: 2018-02-12

## 2018-02-14 ENCOUNTER — OFFICE VISIT (OUTPATIENT)
Dept: SURGERY | Facility: CLINIC | Age: 73
End: 2018-02-14

## 2018-02-14 DIAGNOSIS — E66.01 MORBID OBESITY WITH BMI OF 40.0-44.9, ADULT (HCC): Primary | ICD-10-CM

## 2018-02-14 PROCEDURE — 97802 MEDICAL NUTRITION INDIV IN: CPT | Performed by: DIETITIAN, REGISTERED

## 2018-02-15 ENCOUNTER — OFFICE VISIT (OUTPATIENT)
Dept: NUTRITION/OBESITY MEDICINE | Facility: HOSPITAL | Age: 73
End: 2018-02-15
Attending: SURGERY
Payer: MEDICARE

## 2018-02-15 VITALS — BODY MASS INDEX: 41.8 KG/M2 | HEIGHT: 68.5 IN | WEIGHT: 279 LBS

## 2018-02-15 DIAGNOSIS — E66.01 MORBID OBESITY DUE TO EXCESS CALORIES (HCC): Primary | ICD-10-CM

## 2018-02-15 LAB
ALBUMIN: 4 G/DL
ALBUMIN: 4.1 G/DL
ALKALINE PHOSPHATATE(ALK PHOS): 84 IU/L
ALKALINE PHOSPHATATE(ALK PHOS): 86 IU/L
BILIRUBIN TOTAL: 0.6 MG/DL
BILIRUBIN TOTAL: 0.8 MG/DL
BUN: 13 MG/DL
BUN: 14 MG/DL
CALCIUM: 9.6 MG/DL
CALCIUM: 9.6 MG/DL
CHLORIDE: 103 MEQ/L
CHLORIDE: 107 MEQ/L
CHOLESTEROL, TOTAL: 191 MG/DL
CREATININE, SERUM: 0.97 MG/DL
CREATININE, SERUM: 1.01 MG/DL
GLUCOSE: 101 MG/DL
GLUCOSE: 111 MG/DL
HDL CHOLESTEROL: 33 MG/DL
HEMATOCRIT: 46.4 %
HEMATOCRIT: 53.4 %
HEMOGLOBIN A1C: 5.3 %
HEMOGLOBIN: 16.2 G/DL
HEMOGLOBIN: 19 G/DL
PLATELETS: 165 K/UL
PLATELETS: 199 K/UL
POTASSIUM, SERUM: 3.9 MEQ/L
POTASSIUM, SERUM: 4.2 MEQ/L
PROTEIN, TOTAL: 6.9 G/DL
PROTEIN, TOTAL: 7.5 G/DL
RED BLOOD COUNT: 4.68 X 10-6/U
RED BLOOD COUNT: 5.47 X 10-6/U
SGOT (AST): 66 IU/L
SGOT (AST): 76 IU/L
SGPT (ALT): 135 IU/L
SGPT (ALT): 189 IU/L
SODIUM: 139 MEQ/L
SODIUM: 142 MEQ/L
THYROID STIMULATING HORMONE: 1.48 MLU/L
TRIGLYCERIDES: 443 MG/DL
WHITE BLOOD COUNT: 5.3 X 10-3/U
WHITE BLOOD COUNT: 7.7 X 10-3/U

## 2018-02-15 PROCEDURE — 96101 PSYCL TSTG PR HR F2F TIME W/PT: CPT | Performed by: OTHER

## 2018-02-15 NOTE — PROGRESS NOTES
[facility] [facility]  [address] [place] [address]  Dept: [phone]  Loc: [phone]    02/15/18    Bariatric Initial Nutrition Assessment    Emery Boyer is a 74year old male.     Refe weight management Not Applicable    Patient has support from: Family and Primary care physician    Patient acknowledges binge eating behavior: Eats a larger amount of food than normal in a short period of time.  and Feels a lack of control over the amount o Panel:   Lab Results  Component Value Date   [facility]   TRIG 443 (H) 01/03/2017   HDL 33 (L) 01/03/2017   LDL  01/03/2017   Comment:   Unable to calculate LDL due to Triglycerides >400.0 mg/dL       VLDL  01/03/2017   Comment:   Unable to mala Protein Needs:   grams/day  Estimated Fluid Needs: Aim for 64 ozs per day    Diet history:       Breakfast      AM Snack       Lunch     PM Snack     Dinner Evening Snack   (12 pm) greek yogurt, piece of fruit   Or oatmeal w/ protein shake    30-40 o Reviewed Bariatric Diet Stages 1-4, Discussed goals and Obtain MVI  2. Coordination of care: attend support group prior to surgery; reminder for importance of multidisciplinary consultations.   3.  Materials given: [facility] Bariatric Manual, Goals Handout and

## 2018-02-15 NOTE — PROGRESS NOTES
Psychological Evaluation    Patient Name: Delfina Ogden  Visit Date:  2/15/2018  :   1945    Reason for Referral:    Nallely Anne is a 74year old   male who was referred for a psychological evaluation prior to being scheduled for ba short-term and something he had difficulty maintaining. Concerning medical issues, Gladis Noriega noted that he suffers from hyperlipidemia, asthma, and IBS. Also, per his chart he has been diagnosed as being pre-diabetic.   His current medications include: Martina Fair agreed to participate in the interview and was cooperative with the interviewer and open in his presentation of personal information. He was oriented as to time, person, and place. There appeared to be no abnormalities in his sensorium or mental capacity. notably his goals and values and relationships, aside from his health and self-esteem, as it relates to his weight.     Body Perception/Concerns:    According to his responses on the Body Esteem Scale (BES), Mei Rochelle endorsed holding negative feelings as it overall quality of life. Regarding the former, he related that due to various health issues with which he has struggled he is concerned that his health will worsen if he is unable to maintain a healthy weight.   Due to his unsuccessful attempts he stated t

## 2018-02-16 ENCOUNTER — LAB ENCOUNTER (OUTPATIENT)
Dept: LAB | Facility: HOSPITAL | Age: 73
End: 2018-02-16
Attending: FAMILY MEDICINE
Payer: MEDICARE

## 2018-02-16 ENCOUNTER — PRIOR ORIGINAL RECORDS (OUTPATIENT)
Dept: OTHER | Age: 73
End: 2018-02-16

## 2018-02-16 DIAGNOSIS — R06.00 DYSPNEA, PAROXYSMAL NOCTURNAL: ICD-10-CM

## 2018-02-16 DIAGNOSIS — E78.00 PURE HYPERCHOLESTEROLEMIA: Primary | ICD-10-CM

## 2018-02-16 LAB
ALBUMIN SERPL-MCNC: 4.2 G/DL (ref 3.5–4.8)
ALP LIVER SERPL-CCNC: 93 U/L (ref 45–117)
ALT SERPL-CCNC: 482 U/L (ref 17–63)
AST SERPL-CCNC: 274 U/L (ref 15–41)
BILIRUB SERPL-MCNC: 1.2 MG/DL (ref 0.1–2)
BUN BLD-MCNC: 17 MG/DL (ref 8–20)
CALCIUM BLD-MCNC: 9.8 MG/DL (ref 8.3–10.3)
CHLORIDE: 108 MMOL/L (ref 101–111)
CHOLEST SMN-MCNC: 274 MG/DL (ref ?–200)
CO2: 28 MMOL/L (ref 22–32)
CREAT BLD-MCNC: 0.98 MG/DL (ref 0.7–1.3)
GLUCOSE BLD-MCNC: 99 MG/DL (ref 70–99)
HDLC SERPL-MCNC: 31 MG/DL (ref 45–?)
HDLC SERPL: 8.84 {RATIO} (ref ?–4.97)
LDLC SERPL CALC-MCNC: 179 MG/DL (ref ?–130)
M PROTEIN MFR SERPL ELPH: 7.9 G/DL (ref 6.1–8.3)
NONHDLC SERPL-MCNC: 243 MG/DL (ref ?–130)
POTASSIUM SERPL-SCNC: 4 MMOL/L (ref 3.6–5.1)
SODIUM SERPL-SCNC: 143 MMOL/L (ref 136–144)
TRIGL SERPL-MCNC: 319 MG/DL (ref ?–150)
TSI SER-ACNC: 3.09 MIU/ML (ref 0.35–5.5)
VLDLC SERPL CALC-MCNC: 64 MG/DL (ref 5–40)

## 2018-02-16 PROCEDURE — 80061 LIPID PANEL: CPT

## 2018-02-16 PROCEDURE — 84443 ASSAY THYROID STIM HORMONE: CPT

## 2018-02-16 PROCEDURE — 36415 COLL VENOUS BLD VENIPUNCTURE: CPT

## 2018-02-16 PROCEDURE — 80053 COMPREHEN METABOLIC PANEL: CPT

## 2018-02-19 ENCOUNTER — HOSPITAL ENCOUNTER (OUTPATIENT)
Dept: CV DIAGNOSTICS | Facility: HOSPITAL | Age: 73
Discharge: HOME | End: 2018-02-19
Attending: INTERNAL MEDICINE

## 2018-02-19 ENCOUNTER — MYAURORA ACCOUNT LINK (OUTPATIENT)
Dept: OTHER | Age: 73
End: 2018-02-19

## 2018-02-19 DIAGNOSIS — E66.01 MORBID OBESITY (HCC): ICD-10-CM

## 2018-02-19 DIAGNOSIS — R06.00 DYSPNEA, UNSPECIFIED TYPE: ICD-10-CM

## 2018-02-19 DIAGNOSIS — Z01.810 PRE-OPERATIVE CARDIOVASCULAR EXAMINATION: ICD-10-CM

## 2018-02-20 ENCOUNTER — HOSPITAL ENCOUNTER (OUTPATIENT)
Dept: CV DIAGNOSTICS | Facility: HOSPITAL | Age: 73
Discharge: HOME | End: 2018-02-20
Attending: INTERNAL MEDICINE
Payer: MEDICARE

## 2018-02-20 ENCOUNTER — PRIOR ORIGINAL RECORDS (OUTPATIENT)
Dept: OTHER | Age: 73
End: 2018-02-20

## 2018-02-20 DIAGNOSIS — R06.00 DYSPNEA, PAROXYSMAL NOCTURNAL: ICD-10-CM

## 2018-02-20 DIAGNOSIS — Z01.818 PRE-OPERATIVE EXAMINATION: ICD-10-CM

## 2018-02-20 DIAGNOSIS — E66.9 OBESITY: ICD-10-CM

## 2018-02-20 LAB
ALKALINE PHOSPHATATE(ALK PHOS): 93 IU/L
BILIRUBIN TOTAL: 1.2 MG/DL
BUN: 17 MG/DL
CALCIUM: 9.8 MG/DL
CHLORIDE: 108 MEQ/L
CHOLESTEROL, TOTAL: 274 MG/DL
CREATININE, SERUM: 0.98 MG/DL
GLUCOSE: 99 MG/DL
HDL CHOLESTEROL: 31 MG/DL
LDL CHOLESTEROL: 179 MG/DL
POTASSIUM, SERUM: 4 MEQ/L
PROTEIN, TOTAL: 7.9 G/DL
SGOT (AST): 274 IU/L
SGPT (ALT): 482 IU/L
SODIUM: 143 MEQ/L
THYROID STIMULATING HORMONE: 3.09 MLU/L
TRIGLYCERIDES: 319 MG/DL

## 2018-02-20 PROCEDURE — 78452 HT MUSCLE IMAGE SPECT MULT: CPT | Performed by: INTERNAL MEDICINE

## 2018-02-20 PROCEDURE — 93018 CV STRESS TEST I&R ONLY: CPT | Performed by: INTERNAL MEDICINE

## 2018-02-20 PROCEDURE — 93017 CV STRESS TEST TRACING ONLY: CPT | Performed by: INTERNAL MEDICINE

## 2018-02-22 ENCOUNTER — PRIOR ORIGINAL RECORDS (OUTPATIENT)
Dept: OTHER | Age: 73
End: 2018-02-22

## 2018-02-27 ENCOUNTER — OFFICE VISIT (OUTPATIENT)
Dept: SURGERY | Facility: CLINIC | Age: 73
End: 2018-02-27

## 2018-02-27 ENCOUNTER — TELEPHONE (OUTPATIENT)
Dept: FAMILY MEDICINE CLINIC | Facility: CLINIC | Age: 73
End: 2018-02-27

## 2018-02-27 ENCOUNTER — PRIOR ORIGINAL RECORDS (OUTPATIENT)
Dept: OTHER | Age: 73
End: 2018-02-27

## 2018-02-27 VITALS
RESPIRATION RATE: 16 BRPM | SYSTOLIC BLOOD PRESSURE: 152 MMHG | DIASTOLIC BLOOD PRESSURE: 87 MMHG | HEIGHT: 68 IN | OXYGEN SATURATION: 94 % | BODY MASS INDEX: 41.81 KG/M2 | WEIGHT: 275.88 LBS | HEART RATE: 94 BPM

## 2018-02-27 NOTE — PROGRESS NOTES
[address] [place] [address]  181 Anjana Goodsonmaria dolores  [address] [address]  Dept: [phone]    2/27/2018    BARIATRIC EXISTING PATIENT/FOLLOW UP    HPI:  Delfina Ogden is a 74year old-year old male who hyperlipidemia and potential sleep apnea. He does have follow-up with the pulmonologist in the future for the insomnia we talked about sleep issues today.   Recently his LFTs have gone up and I spoke with his primary care provider today he will get plugged

## 2018-02-28 ENCOUNTER — OFFICE VISIT (OUTPATIENT)
Dept: FAMILY MEDICINE CLINIC | Facility: CLINIC | Age: 73
End: 2018-02-28

## 2018-02-28 VITALS
RESPIRATION RATE: 16 BRPM | DIASTOLIC BLOOD PRESSURE: 82 MMHG | SYSTOLIC BLOOD PRESSURE: 138 MMHG | HEIGHT: 69 IN | TEMPERATURE: 99 F | HEART RATE: 80 BPM | BODY MASS INDEX: 39.99 KG/M2 | WEIGHT: 270 LBS

## 2018-02-28 DIAGNOSIS — F32.5 MAJOR DEPRESSION IN REMISSION (HCC): ICD-10-CM

## 2018-02-28 DIAGNOSIS — I77.811 ECTATIC ABDOMINAL AORTA (HCC): ICD-10-CM

## 2018-02-28 DIAGNOSIS — M06.9 RHEUMATOID ARTHRITIS, INVOLVING UNSPECIFIED SITE, UNSPECIFIED RHEUMATOID FACTOR PRESENCE: ICD-10-CM

## 2018-02-28 DIAGNOSIS — E78.5 HYPERLIPIDEMIA, UNSPECIFIED HYPERLIPIDEMIA TYPE: ICD-10-CM

## 2018-02-28 DIAGNOSIS — R74.8 ELEVATED LIVER ENZYMES: Primary | ICD-10-CM

## 2018-02-28 DIAGNOSIS — I70.0 THORACIC AORTA ATHEROSCLEROSIS (HCC): ICD-10-CM

## 2018-02-28 DIAGNOSIS — I27.20 PULMONARY HYPERTENSION (HCC): ICD-10-CM

## 2018-02-28 DIAGNOSIS — E66.01 SEVERE OBESITY (BMI 35.0-39.9): ICD-10-CM

## 2018-02-28 PROCEDURE — 99214 OFFICE O/P EST MOD 30 MIN: CPT | Performed by: FAMILY MEDICINE

## 2018-02-28 NOTE — PROGRESS NOTES
Patient presents with:  Abnormal Labs: elevated LFTs     HPI:   Karson Luevano is a 74year old male with PMH obesity, PTSD and depression, polyarthritis who presents to the office for evaluation of elevated liver enzymes.   Pt unclear why these elevate Rfl:    Multiple Vitamins-Minerals (MULTIVITAMIN & MINERAL OR) Take by mouth daily. Disp:  Rfl:    Neomycin-Polymyxin-HC [id number] Otic Solution  Disp:  Rfl: 0     No current facility-administered medications on file prior to visit.         Smoking stat 60-65%. There was no diagnostic evidence for regional wall motion abnormalities. Doppler parameters are   consistent with abnormal left ventricular relaxation - grade 1 diastolicdysfunction.   2. Left atrium: The left atrial volume was normal.  3. Pulmonary 13.0 - 17.0 g/dL  19.0 (H)   Hematocrit      37.0 - 53.0 %  53.4 (H)   Platelet Count      957.6 - 450.0 10(3)uL  199.0   MCV      80.0 - 99.0 fL  97.6   MCH      27.0 - 33.2 pg  34.7 (H)   MCHC      31.0 - 37.0 g/dL  35.6   RDW      11.5 - 16.0 %  11.9 factor control    6. Thoracic aorta atherosclerosis ([address]  As above, risk factor control .     7. Pulmonary hypertension ([address]  Noted on recent echo  This is not likely to be new, doubtful this is impacting the liver especially with no other congestion symp

## 2018-03-07 ENCOUNTER — LAB ENCOUNTER (OUTPATIENT)
Dept: LAB | Age: 73
End: 2018-03-07
Attending: FAMILY MEDICINE
Payer: MEDICARE

## 2018-03-07 DIAGNOSIS — R79.89 ABNORMAL LIVER FUNCTION TESTS: ICD-10-CM

## 2018-03-07 DIAGNOSIS — M06.9 RHEUMATOID ARTHRITIS, INVOLVING UNSPECIFIED SITE, UNSPECIFIED RHEUMATOID FACTOR PRESENCE: ICD-10-CM

## 2018-03-07 DIAGNOSIS — E66.01 SEVERE OBESITY (BMI 35.0-39.9): ICD-10-CM

## 2018-03-07 DIAGNOSIS — R74.8 ELEVATED LIVER ENZYMES: ICD-10-CM

## 2018-03-07 LAB
ACETAMINOPHEN: <2 UG/ML (ref ?–2)
ALBUMIN SERPL-MCNC: 4.4 G/DL (ref 3.5–4.8)
ALP LIVER SERPL-CCNC: 93 U/L (ref 45–117)
ALT SERPL-CCNC: 535 U/L (ref 17–63)
AMPHETAMINE URINE: NEGATIVE
AST SERPL-CCNC: 182 U/L (ref 15–41)
BARBITURATES URINE: NEGATIVE
BILIRUB SERPL-MCNC: 0.7 MG/DL (ref 0.1–2)
BUN BLD-MCNC: 16 MG/DL (ref 8–20)
CALCIUM BLD-MCNC: 9.8 MG/DL (ref 8.3–10.3)
CANNABINOID URINE: NEGATIVE
CERULOPLASMIN: 27.1 MG/DL (ref 20–60)
CHLORIDE: 104 MMOL/L (ref 101–111)
CLARITY UR REFRACT.AUTO: CLEAR
CO2: 26 MMOL/L (ref 22–32)
COCAINE URINE: NEGATIVE
COLOR UR AUTO: YELLOW
CREAT BLD-MCNC: 1.02 MG/DL (ref 0.7–1.3)
ETHYL ALCOHOL, QUALITATIVE: NEGATIVE
GAMMA GLUTAMYL TRANSFERASE: 191 U/L (ref 15–85)
GLUCOSE BLD-MCNC: 123 MG/DL (ref 70–99)
GLUCOSE UR STRIP.AUTO-MCNC: NEGATIVE MG/DL
HBV SURFACE AB SER QL: NONREACTIVE
HBV SURFACE AB SERPL IA-ACNC: <3.1 MIU/ML
HBV SURFACE AG SERPL QL IA: NONREACTIVE
HEPATITIS A VIRUS AB, TOTAL: NONREACTIVE
HEPATITIS B SURFACE ANTIGEN INDEX: <0.1
HEPATITIS C VIRUS AB INTERPRETATION: NONREACTIVE
HYALINE CASTS #/AREA URNS AUTO: PRESENT /LPF
LEUKOCYTE ESTERASE UR QL STRIP.AUTO: NEGATIVE
LIPASE: 159 U/L (ref 73–393)
M PROTEIN MFR SERPL ELPH: 8.1 G/DL (ref 6.1–8.3)
NITRITE UR QL STRIP.AUTO: POSITIVE
OPIATE URINE: NEGATIVE
PCP URINE: NEGATIVE
PH UR STRIP.AUTO: 6 [PH] (ref 4.5–8)
POTASSIUM SERPL-SCNC: 3.5 MMOL/L (ref 3.6–5.1)
PROT UR STRIP.AUTO-MCNC: >=300 MG/DL
SODIUM SERPL-SCNC: 137 MMOL/L (ref 136–144)
SP GR UR STRIP.AUTO: >=1.03 (ref 1–1.03)
UROBILINOGEN UR STRIP.AUTO-MCNC: 0.2 MG/DL

## 2018-03-07 PROCEDURE — 80053 COMPREHEN METABOLIC PANEL: CPT

## 2018-03-07 PROCEDURE — 86706 HEP B SURFACE ANTIBODY: CPT

## 2018-03-07 PROCEDURE — 86803 HEPATITIS C AB TEST: CPT

## 2018-03-07 PROCEDURE — 86235 NUCLEAR ANTIGEN ANTIBODY: CPT

## 2018-03-07 PROCEDURE — 83516 IMMUNOASSAY NONANTIBODY: CPT

## 2018-03-07 PROCEDURE — 80346 BENZODIAZEPINES1-12: CPT

## 2018-03-07 PROCEDURE — 81001 URINALYSIS AUTO W/SCOPE: CPT

## 2018-03-07 PROCEDURE — 82390 ASSAY OF CERULOPLASMIN: CPT

## 2018-03-07 PROCEDURE — 84478 ASSAY OF TRIGLYCERIDES: CPT

## 2018-03-07 PROCEDURE — 82977 ASSAY OF GGT: CPT

## 2018-03-07 PROCEDURE — 80307 DRUG TEST PRSMV CHEM ANLYZR: CPT

## 2018-03-07 PROCEDURE — 86038 ANTINUCLEAR ANTIBODIES: CPT

## 2018-03-07 PROCEDURE — 83690 ASSAY OF LIPASE: CPT

## 2018-03-07 PROCEDURE — 86225 DNA ANTIBODY NATIVE: CPT

## 2018-03-07 PROCEDURE — 80329 ANALGESICS NON-OPIOID 1 OR 2: CPT

## 2018-03-07 PROCEDURE — 87340 HEPATITIS B SURFACE AG IA: CPT

## 2018-03-07 PROCEDURE — 82103 ALPHA-1-ANTITRYPSIN TOTAL: CPT

## 2018-03-07 PROCEDURE — 86708 HEPATITIS A ANTIBODY: CPT

## 2018-03-08 ENCOUNTER — TELEPHONE (OUTPATIENT)
Dept: FAMILY MEDICINE CLINIC | Facility: CLINIC | Age: 73
End: 2018-03-08

## 2018-03-08 DIAGNOSIS — N39.0 URINARY TRACT INFECTION WITHOUT HEMATURIA, SITE UNSPECIFIED: ICD-10-CM

## 2018-03-08 DIAGNOSIS — R53.82 CHRONIC FATIGUE SYNDROME: ICD-10-CM

## 2018-03-08 DIAGNOSIS — R74.8 ELEVATED LIVER ENZYMES: Primary | ICD-10-CM

## 2018-03-08 RX ORDER — SULFAMETHOXAZOLE AND TRIMETHOPRIM 800; 160 MG/1; MG/1
1 TABLET ORAL 2 TIMES DAILY
Qty: 6 TABLET | Refills: 0 | Status: SHIPPED | OUTPATIENT
Start: 2018-03-08 | End: 2018-03-11

## 2018-03-08 NOTE — TELEPHONE ENCOUNTER
Called patient back, informed him that the referral to [address] [place] [address] has been approved and he can call [facility] to schedule with the first doctor that is available to see him.   Pt can call me back and let me know who he schedule with and I will change the

## 2018-03-08 NOTE — TELEPHONE ENCOUNTER
Patient called office, informed him of Dr Franklin Aldana comments on lab results, instructed to start Bactrim DS, BID x 3 days, also discussed the elevated liver enzymes and to schedule an appt with Dr Garrick Baer or whomever is able to see him soon.  Gave Pt Suburb

## 2018-03-09 LAB
ALPHA 1 ANTITRYPSIN SERUM: 149 MG/DL (ref 90–200)
TRIGL SERPL-MCNC: 248 MG/DL (ref ?–150)

## 2018-03-10 LAB
7-AMINOCLONAZEPAM, URINE: <5 NG/ML
ALPHA-HYDROXYALPRAZOLAM, URINE: 17 NG/ML
ALPHA-HYDROXYMIDAZOLAM, URINE: <20 NG/ML
ALPRAZOLAM, URINE: 16 NG/ML
CHLORDIAZEPOXIDE, URINE: <20 NG/ML
CLONAZEPAM, URINE: <5 NG/ML
DIAZEPAM, URINE: <20 NG/ML
LORAZEPAM, URINE: <20 NG/ML
MIDAZOLAM, URINE: <20 NG/ML
NORDIAZEPAM, URINE: <20 NG/ML
OXAZEPAM, URINE: <20 NG/ML
TEMAZEPAM, URINE: <20 NG/ML

## 2018-03-11 LAB
ANA SCREEN: POSITIVE
CENTROMERE AUTOAB: <100 AU/ML (ref ?–100)
DSDNA AUTOAB: <100 IU/ML (ref ?–100)
F-ACTIN (SMOOTH MUSCLE) AB: 18 UNITS
HISTONE AUTOAB: <100 AU/ML (ref ?–100)
JO-1 AUTOAB: <100 AU/ML (ref ?–100)
MITOCHONDRIAL M2 AB, IGG: 5.2 UNITS
RNP AUTOAB: <100 AU/ML (ref ?–100)
SCL-70 AUTOAB: <100 AU/ML (ref ?–100)
SM AUTOAB (SMITH): <100 AU/ML (ref ?–100)
SSA AUTOAB: 192 AU/ML (ref ?–100)
SSB AUTOAB: <100 AU/ML (ref ?–100)

## 2018-03-12 ENCOUNTER — APPOINTMENT (OUTPATIENT)
Dept: LAB | Age: 73
End: 2018-03-12
Attending: INTERNAL MEDICINE
Payer: MEDICARE

## 2018-03-12 DIAGNOSIS — R79.89 ABNORMAL LIVER FUNCTION TESTS: ICD-10-CM

## 2018-03-12 LAB
ALBUMIN SERPL-MCNC: 4.5 G/DL (ref 3.5–4.8)
ALP LIVER SERPL-CCNC: 86 U/L (ref 45–117)
ALT SERPL-CCNC: 389 U/L (ref 17–63)
AST SERPL-CCNC: 139 U/L (ref 15–41)
BILIRUB SERPL-MCNC: 0.9 MG/DL (ref 0.1–2)
BUN BLD-MCNC: 18 MG/DL (ref 8–20)
CALCIUM BLD-MCNC: 10 MG/DL (ref 8.3–10.3)
CHLORIDE: 106 MMOL/L (ref 101–111)
CK: 194 IU/L (ref 39–308)
CO2: 22 MMOL/L (ref 22–32)
CREAT BLD-MCNC: 1.35 MG/DL (ref 0.7–1.3)
GLUCOSE BLD-MCNC: 125 MG/DL (ref 70–99)
INR BLD: 1.06 (ref 0.89–1.11)
M PROTEIN MFR SERPL ELPH: 8.5 G/DL (ref 6.1–8.3)
POTASSIUM SERPL-SCNC: 3.9 MMOL/L (ref 3.6–5.1)
PSA SERPL DL<=0.01 NG/ML-MCNC: 13.8 SECONDS (ref 12–14.3)
SODIUM SERPL-SCNC: 141 MMOL/L (ref 136–144)

## 2018-03-12 PROCEDURE — 80053 COMPREHEN METABOLIC PANEL: CPT

## 2018-03-12 PROCEDURE — 85610 PROTHROMBIN TIME: CPT

## 2018-03-12 PROCEDURE — 86038 ANTINUCLEAR ANTIBODIES: CPT

## 2018-03-12 PROCEDURE — 86480 TB TEST CELL IMMUN MEASURE: CPT

## 2018-03-12 PROCEDURE — 82550 ASSAY OF CK (CPK): CPT

## 2018-03-14 ENCOUNTER — OFFICE VISIT (OUTPATIENT)
Dept: SURGERY | Facility: CLINIC | Age: 73
End: 2018-03-14

## 2018-03-14 VITALS — WEIGHT: 267.06 LBS | BODY MASS INDEX: 39.56 KG/M2 | HEIGHT: 69 IN

## 2018-03-14 DIAGNOSIS — E55.9 VITAMIN D DEFICIENCY: ICD-10-CM

## 2018-03-14 DIAGNOSIS — K76.0 FATTY LIVER: ICD-10-CM

## 2018-03-14 DIAGNOSIS — E78.2 HYPERCHOLESTEROLEMIA WITH HYPERTRIGLYCERIDEMIA: ICD-10-CM

## 2018-03-14 DIAGNOSIS — I10 ESSENTIAL HYPERTENSION: Primary | ICD-10-CM

## 2018-03-14 DIAGNOSIS — E66.01 SEVERE OBESITY (BMI 35.0-39.9): ICD-10-CM

## 2018-03-14 DIAGNOSIS — R73.03 PREDIABETES: ICD-10-CM

## 2018-03-14 DIAGNOSIS — R73.09 ABNORMAL BLOOD SUGAR: ICD-10-CM

## 2018-03-14 PROCEDURE — 99204 OFFICE O/P NEW MOD 45 MIN: CPT | Performed by: INTERNAL MEDICINE

## 2018-03-14 NOTE — PROGRESS NOTES
[facility] Consultation Note       Date of Consult:  3/14/2018    Patient:  Josh Hilliard  :      1945  MRN:      [id number]    Referring Provider: Dr. Sobeida Hays       Chief Complaint:  Patient presents with:  Consult: Dr. Ross Fernandez Years of education: N/A  Number of children: N/A     Occupational History  Retired       Social History Main Topics   Smoking status: Former Smoker     Smokeless tobacco: Never Used    Comment: only as teenager    Alcohol use Yes  4.2 oz/week    7 Standard insomnia and does not sleep well.  Schedule will alter    Breakfast AM Snack Lunch PM Snack Dinner   Muscle milk, banana Orange,  Beef, chicken, lunch meat, rice, veggies Juice with carbonated water Hester with deli meats   Wife is [place].   etoh a fe relatively good control. HYPERCHOLESTEROLEMIA:  The patient states that his cholesterol has been well controlled on his current medication.       Lab Results  Component Value Date/Time   CHOLEST 274 (H) 02/16/2018 11:40 AM    (H) 02/16/2018 11:40 from prostatomegaly but correlation with urinalysis is recommended to exclude cystitis. #3. Hepatic steatosis. #4. Small pulmonary nodules as described above. Consider CT chest in one year to document stability.      Needs bariatric blood work      Orders

## 2018-03-15 LAB — ANA SCREEN: POSITIVE

## 2018-03-18 LAB
M TB TUBERC IFN-G BLD QL: NEGATIVE
M TB TUBERC IFN-G/MITOGEN IGNF BLD: 0 IU/ML
M TB TUBERC IGNF/MITOGEN IGNF CONTROL: >10 IU/ML
MITOGEN IGNF BCKGRD COR BLD-ACNC: 0.02 IU/ML

## 2018-03-19 ENCOUNTER — LAB ENCOUNTER (OUTPATIENT)
Dept: LAB | Age: 73
End: 2018-03-19
Attending: INTERNAL MEDICINE
Payer: MEDICARE

## 2018-03-19 DIAGNOSIS — K76.0 FATTY LIVER: ICD-10-CM

## 2018-03-19 DIAGNOSIS — E55.9 VITAMIN D DEFICIENCY: ICD-10-CM

## 2018-03-19 DIAGNOSIS — R79.89 ABNORMAL LIVER FUNCTION TESTS: ICD-10-CM

## 2018-03-19 DIAGNOSIS — I10 ESSENTIAL HYPERTENSION: ICD-10-CM

## 2018-03-19 DIAGNOSIS — E66.01 SEVERE OBESITY (BMI 35.0-39.9): ICD-10-CM

## 2018-03-19 DIAGNOSIS — R73.03 PREDIABETES: ICD-10-CM

## 2018-03-19 DIAGNOSIS — E78.2 HYPERCHOLESTEROLEMIA WITH HYPERTRIGLYCERIDEMIA: ICD-10-CM

## 2018-03-19 DIAGNOSIS — R73.09 ABNORMAL BLOOD SUGAR: ICD-10-CM

## 2018-03-19 LAB
25-HYDROXYVITAMIN D (TOTAL): 14.9 NG/ML (ref 30–100)
ALBUMIN SERPL-MCNC: 4.1 G/DL (ref 3.5–4.8)
ALP LIVER SERPL-CCNC: 75 U/L (ref 45–117)
ALT SERPL-CCNC: 289 U/L (ref 17–63)
AST SERPL-CCNC: 114 U/L (ref 15–41)
BILIRUB SERPL-MCNC: 1 MG/DL (ref 0.1–2)
BUN BLD-MCNC: 15 MG/DL (ref 8–20)
CALCIUM BLD-MCNC: 9.2 MG/DL (ref 8.3–10.3)
CHLORIDE: 107 MMOL/L (ref 101–111)
CO2: 24 MMOL/L (ref 22–32)
CREAT BLD-MCNC: 0.98 MG/DL (ref 0.7–1.3)
EST. AVERAGE GLUCOSE BLD GHB EST-MCNC: 105 MG/DL (ref 68–126)
FOLATE (FOLIC ACID), SERUM: 14.5 NG/ML (ref 8.7–24)
GLUCOSE BLD-MCNC: 116 MG/DL (ref 70–99)
HAV AB SERPL IA-ACNC: 934 PG/ML (ref 193–986)
HAV IGM SER QL: 2.3 MG/DL (ref 1.7–3)
HBA1C MFR BLD HPLC: 5.3 % (ref ?–5.7)
IRON SATURATION: 47 % (ref 13–45)
IRON: 178 UG/DL (ref 45–182)
M PROTEIN MFR SERPL ELPH: 7.7 G/DL (ref 6.1–8.3)
PHOSPHATE SERPL-MCNC: 3.2 MG/DL (ref 2.5–4.9)
POTASSIUM SERPL-SCNC: 3.8 MMOL/L (ref 3.6–5.1)
SODIUM SERPL-SCNC: 140 MMOL/L (ref 136–144)
TOTAL IRON BINDING CAPACITY: 375 UG/DL (ref 298–536)
TRANSFERRIN: 252 MG/DL (ref 200–360)

## 2018-03-19 PROCEDURE — 82746 ASSAY OF FOLIC ACID SERUM: CPT

## 2018-03-19 PROCEDURE — 82306 VITAMIN D 25 HYDROXY: CPT

## 2018-03-19 PROCEDURE — 83735 ASSAY OF MAGNESIUM: CPT

## 2018-03-19 PROCEDURE — 83540 ASSAY OF IRON: CPT

## 2018-03-19 PROCEDURE — 84100 ASSAY OF PHOSPHORUS: CPT

## 2018-03-19 PROCEDURE — 82607 VITAMIN B-12: CPT

## 2018-03-19 PROCEDURE — 83036 HEMOGLOBIN GLYCOSYLATED A1C: CPT

## 2018-03-19 PROCEDURE — 80053 COMPREHEN METABOLIC PANEL: CPT

## 2018-03-19 PROCEDURE — 84425 ASSAY OF VITAMIN B-1: CPT

## 2018-03-19 PROCEDURE — 83550 IRON BINDING TEST: CPT

## 2018-03-20 ENCOUNTER — OFFICE VISIT (OUTPATIENT)
Dept: SURGERY | Facility: CLINIC | Age: 73
End: 2018-03-20

## 2018-03-20 VITALS — BODY MASS INDEX: 40.01 KG/M2 | HEIGHT: 69 IN | WEIGHT: 270.13 LBS

## 2018-03-20 DIAGNOSIS — E66.01 SEVERE OBESITY (BMI 35.0-39.9): ICD-10-CM

## 2018-03-20 PROCEDURE — 0358T BIA WHOLE BODY: CPT | Performed by: DIETITIAN, REGISTERED

## 2018-03-20 PROCEDURE — 97803 MED NUTRITION INDIV SUBSEQ: CPT | Performed by: DIETITIAN, REGISTERED

## 2018-03-20 NOTE — PROGRESS NOTES
[address] [place] [address] AND [facility] LOSS CLINIC  [address] [place] [address]  Dept: [phone]  Loc: [phone]    03/20/18      Bariatric Follow-up Nutrition Session    Delfina Ogden is a 74year old male.      As (L) 03/19/2018     No results found for: THIAMINE   No results found for: VITB1    Lab Results  Component Value Date/Time   FOLIC 72.6 43/42/6030 80:83 PM        Meds:     Current Outpatient Prescriptions:   •  Albuterol Sulfate HFA (PROAIR HFA) 108 (90 Ba caffeine, practicing bariatric habits like not drinking with straws, not drinking with meals and chewing thoroughly  Food intolerances:  None new  Vitamin/mineral supplements:  Adult MVI  Protein supplements:  Muscle Milk     Activity Level: minimal and li Food intolerances, Activity level, Vitamin/mineral supplementation, Reinforce goals, Calorie/protein intake and Other:  f/u low vit D, RTC for f/u     Additional RD visits required to review concepts? Yes  Patient understands protein requirements?  Yes, roman

## 2018-03-22 ENCOUNTER — TELEPHONE (OUTPATIENT)
Dept: SURGERY | Facility: CLINIC | Age: 73
End: 2018-03-22

## 2018-03-22 ENCOUNTER — TELEPHONE (OUTPATIENT)
Dept: FAMILY MEDICINE CLINIC | Facility: CLINIC | Age: 73
End: 2018-03-22

## 2018-03-22 DIAGNOSIS — F51.01 PRIMARY INSOMNIA: ICD-10-CM

## 2018-03-22 DIAGNOSIS — F43.10 PTSD (POST-TRAUMATIC STRESS DISORDER): ICD-10-CM

## 2018-03-22 LAB — VITAMIN B1 (THIAMINE), WHOLE B: 139 NMOL/L

## 2018-03-22 NOTE — TELEPHONE ENCOUNTER
Dr. Mamie Faye reviewed recent labs and the vitamin d level is low. Dr. Mamie Faye would like for patient to start Drisdol Vitamin D 50,000 iu capsules once weekly for 12 weeks. I will send it to the Pharmacy on file.  Patient verbalized understanding

## 2018-03-22 NOTE — TELEPHONE ENCOUNTER
Patient is wanting to know if Dr. Jacky Desai would like him to schedule an appointment for lab results follow-up.

## 2018-03-23 RX ORDER — ALPRAZOLAM 1 MG/1
1 TABLET ORAL 2 TIMES DAILY PRN
Qty: 40 TABLET | Refills: 1 | OUTPATIENT
Start: 2018-03-23 | End: 2018-03-27

## 2018-03-23 RX ORDER — ACETAMINOPHEN AND CODEINE PHOSPHATE 120; 12 MG/5ML; MG/5ML
SOLUTION ORAL
Qty: 30 CAPSULE | Refills: 1 | Status: ON HOLD | OUTPATIENT
Start: 2018-03-23 | End: 2018-06-13

## 2018-03-23 NOTE — TELEPHONE ENCOUNTER
Aprazolam and Flurazepam refills requested through pharmacy. LOV 2/28/18. Will you approve refills ? Routed to Dr Aracelis Siddiqui.

## 2018-03-23 NOTE — TELEPHONE ENCOUNTER
Not right now. I want to see the results of the full GI workup first.  I think it might be worth seeing each other after that is complete. The liver enzymes are getting better the last 2 times, which I am happy to see.

## 2018-03-26 DIAGNOSIS — F43.10 PTSD (POST-TRAUMATIC STRESS DISORDER): ICD-10-CM

## 2018-03-26 DIAGNOSIS — F51.01 PRIMARY INSOMNIA: ICD-10-CM

## 2018-03-26 RX ORDER — ALPRAZOLAM 1 MG/1
TABLET ORAL
Qty: 40 TABLET | Refills: 0 | OUTPATIENT
Start: 2018-03-26

## 2018-03-26 RX ORDER — ACETAMINOPHEN AND CODEINE PHOSPHATE 120; 12 MG/5ML; MG/5ML
SOLUTION ORAL
Qty: 30 CAPSULE | Refills: 0 | OUTPATIENT
Start: 2018-03-26

## 2018-03-26 NOTE — TELEPHONE ENCOUNTER
LOV 2/28/2018     Future Appointments  Date Time Provider Doroteo Hernandez   4/9/2018 1:00 PM [facility]agan [facility]   4/10/2018 10:45 AM Roxanne Roman MD [address] [facility]   4/10/2018 11:15 AM Aylin Jasmine RD [address] [facility]   4/1

## 2018-03-29 NOTE — PAT NURSING NOTE
Pt returned RN call from earlier. RN informed pt that dr. Kimberly Otoole does not want him to have full anesthesia, nurse sedation with fentanyl should be appropriate. RN answered pt's questions and concerns. Pt is agreeable to try.

## 2018-04-02 NOTE — TELEPHONE ENCOUNTER
Front Staff, please assist this patient with scheduling an appt with Dr Maurilio Sarmiento he completes the testing with GI on 4/19/18

## 2018-04-02 NOTE — TELEPHONE ENCOUNTER
Called patient and left message to contact the office to schedule appointment with Dr. Teodoro Lamb after his GI workup

## 2018-04-09 ENCOUNTER — HOSPITAL ENCOUNTER (OUTPATIENT)
Dept: ULTRASOUND IMAGING | Facility: HOSPITAL | Age: 73
Discharge: HOME | End: 2018-04-09
Attending: INTERNAL MEDICINE
Payer: MEDICARE

## 2018-04-09 ENCOUNTER — APPOINTMENT (OUTPATIENT)
Dept: LAB | Facility: HOSPITAL | Age: 73
End: 2018-04-09
Attending: INTERNAL MEDICINE
Payer: MEDICARE

## 2018-04-09 VITALS
DIASTOLIC BLOOD PRESSURE: 75 MMHG | RESPIRATION RATE: 18 BRPM | SYSTOLIC BLOOD PRESSURE: 125 MMHG | HEIGHT: 69 IN | OXYGEN SATURATION: 95 % | HEART RATE: 72 BPM | BODY MASS INDEX: 39.99 KG/M2 | WEIGHT: 270 LBS | TEMPERATURE: 98 F

## 2018-04-09 DIAGNOSIS — R79.89 ABNORMAL LIVER FUNCTION TESTS: ICD-10-CM

## 2018-04-09 DIAGNOSIS — R79.89 ABNORMAL LIVER FUNCTION TESTS: Primary | ICD-10-CM

## 2018-04-09 PROCEDURE — 36415 COLL VENOUS BLD VENIPUNCTURE: CPT

## 2018-04-09 PROCEDURE — 47000 NEEDLE BIOPSY OF LIVER PERQ: CPT | Performed by: INTERNAL MEDICINE

## 2018-04-09 PROCEDURE — 85027 COMPLETE CBC AUTOMATED: CPT

## 2018-04-09 PROCEDURE — 88307 TISSUE EXAM BY PATHOLOGIST: CPT | Performed by: INTERNAL MEDICINE

## 2018-04-09 PROCEDURE — 88313 SPECIAL STAINS GROUP 2: CPT | Performed by: INTERNAL MEDICINE

## 2018-04-09 PROCEDURE — 76942 ECHO GUIDE FOR BIOPSY: CPT | Performed by: INTERNAL MEDICINE

## 2018-04-09 PROCEDURE — 99152 MOD SED SAME PHYS/QHP 5/>YRS: CPT | Performed by: INTERNAL MEDICINE

## 2018-04-09 PROCEDURE — 85610 PROTHROMBIN TIME: CPT

## 2018-04-09 RX ORDER — NALOXONE HYDROCHLORIDE 0.4 MG/ML
80 INJECTION, SOLUTION INTRAMUSCULAR; INTRAVENOUS; SUBCUTANEOUS AS NEEDED
Status: DISCONTINUED | OUTPATIENT
Start: 2018-04-09 | End: 2018-04-11

## 2018-04-09 RX ORDER — DIPHENHYDRAMINE HYDROCHLORIDE 50 MG/ML
50 INJECTION INTRAMUSCULAR; INTRAVENOUS ONCE
Status: DISCONTINUED | OUTPATIENT
Start: 2018-04-09 | End: 2018-04-11

## 2018-04-09 RX ORDER — FLUMAZENIL 0.1 MG/ML
INJECTION, SOLUTION INTRAVENOUS
Status: DISCONTINUED
Start: 2018-04-09 | End: 2018-04-09 | Stop reason: WASHOUT

## 2018-04-09 RX ORDER — NALOXONE HYDROCHLORIDE 0.4 MG/ML
INJECTION, SOLUTION INTRAMUSCULAR; INTRAVENOUS; SUBCUTANEOUS
Status: DISCONTINUED
Start: 2018-04-09 | End: 2018-04-09 | Stop reason: WASHOUT

## 2018-04-09 RX ORDER — MIDAZOLAM HYDROCHLORIDE 1 MG/ML
1 INJECTION INTRAMUSCULAR; INTRAVENOUS EVERY 5 MIN PRN
Status: ACTIVE | OUTPATIENT
Start: 2018-04-09 | End: 2018-04-09

## 2018-04-09 RX ORDER — SODIUM CHLORIDE 9 MG/ML
INJECTION, SOLUTION INTRAVENOUS CONTINUOUS
Status: DISCONTINUED | OUTPATIENT
Start: 2018-04-09 | End: 2018-04-11

## 2018-04-09 RX ORDER — DIPHENHYDRAMINE HYDROCHLORIDE 50 MG/ML
INJECTION INTRAMUSCULAR; INTRAVENOUS
Status: COMPLETED
Start: 2018-04-09 | End: 2018-04-09

## 2018-04-09 RX ORDER — MIDAZOLAM HYDROCHLORIDE 1 MG/ML
INJECTION INTRAMUSCULAR; INTRAVENOUS
Status: COMPLETED
Start: 2018-04-09 | End: 2018-04-09

## 2018-04-09 RX ORDER — FLUMAZENIL 0.1 MG/ML
0.2 INJECTION, SOLUTION INTRAVENOUS AS NEEDED
Status: DISCONTINUED | OUTPATIENT
Start: 2018-04-09 | End: 2018-04-11

## 2018-04-09 RX ADMIN — DIPHENHYDRAMINE HYDROCHLORIDE 50 MG: 50 INJECTION INTRAMUSCULAR; INTRAVENOUS at 13:33:00

## 2018-04-09 RX ADMIN — MIDAZOLAM HYDROCHLORIDE 1 MG: 1 INJECTION INTRAMUSCULAR; INTRAVENOUS at 13:29:00

## 2018-04-09 RX ADMIN — SODIUM CHLORIDE 500 ML: 9 INJECTION, SOLUTION INTRAVENOUS at 14:00:00

## 2018-04-09 RX ADMIN — MIDAZOLAM HYDROCHLORIDE 1 MG: 1 INJECTION INTRAMUSCULAR; INTRAVENOUS at 13:39:00

## 2018-04-09 RX ADMIN — MIDAZOLAM HYDROCHLORIDE 1 MG: 1 INJECTION INTRAMUSCULAR; INTRAVENOUS at 13:34:00

## 2018-04-09 NOTE — PROGRESS NOTES
[address] [place] [address]  [facility]  [address] [address]  Dept: [phone]    4/10/2018    BARIATRIC EXISTING PATIENT/FOLLOW UP    HPI:  Amelia Perla is a 74year old-year old male who hopefully at that time we can select a date for surgery I would anticipate sleeve sometime in June    Anuradha Love MD  2/27/2018

## 2018-04-09 NOTE — IMAGING NOTE
Phone report called to [facility]  in same day, verbal report given about vitals, medications and procedure, no questions at this time. Nurse transported patient to same day.

## 2018-04-10 ENCOUNTER — OFFICE VISIT (OUTPATIENT)
Dept: SURGERY | Facility: CLINIC | Age: 73
End: 2018-04-10

## 2018-04-10 ENCOUNTER — TELEPHONE (OUTPATIENT)
Dept: SURGERY | Facility: CLINIC | Age: 73
End: 2018-04-10

## 2018-04-10 VITALS — HEIGHT: 68 IN | BODY MASS INDEX: 41.57 KG/M2 | WEIGHT: 274.25 LBS

## 2018-04-10 VITALS
HEIGHT: 68 IN | DIASTOLIC BLOOD PRESSURE: 82 MMHG | RESPIRATION RATE: 16 BRPM | WEIGHT: 274.31 LBS | BODY MASS INDEX: 41.57 KG/M2 | HEART RATE: 71 BPM | SYSTOLIC BLOOD PRESSURE: 141 MMHG

## 2018-04-10 DIAGNOSIS — E66.01 MORBID OBESITY WITH BMI OF 40.0-44.9, ADULT (HCC): Primary | ICD-10-CM

## 2018-04-10 PROCEDURE — 97803 MED NUTRITION INDIV SUBSEQ: CPT | Performed by: DIETITIAN, REGISTERED

## 2018-04-10 NOTE — PROGRESS NOTES
[address] [place] [address] AND [facility] LOSS CLINIC  [address] [place] [address]  Dept: [phone]  Loc: [phone]    04/10/18      Bariatric Follow-up Nutrition Session    Fatou Aguilar is a 74year old male.      As Value Date   B12 934 03/19/2018       Lab Results  Component Value Date   VITD 14.9 (L) 03/19/2018       Lab Results  Component Value Date/Time   THIAMINE 139 03/19/2018 01:11 PM      No results found for: VITB1    Lab Results  Component Value Date/Time Activity Level: minimal and limited ambulation due to chronic back and leg pain  · Type: other: chair exercises, stretching  · Duration: 15 minutes  · Frequency: per day    Other:  Patient up 3 lbs since last visit.  Was limited in food options as wife

## 2018-04-18 ENCOUNTER — TELEPHONE (OUTPATIENT)
Dept: SURGERY | Facility: CLINIC | Age: 73
End: 2018-04-18

## 2018-04-18 NOTE — TELEPHONE ENCOUNTER
Called patient to discuss appropriate post-op bariatric vitamins. Nascobal was not covered by patient's insurance. Patient does not have internet access to purchase online. Suggested Optisource Chewable Bariatric Vitamins he may  at [employer].  [place]

## 2018-04-19 ENCOUNTER — ANESTHESIA (OUTPATIENT)
Dept: ENDOSCOPY | Facility: HOSPITAL | Age: 73
End: 2018-04-19
Payer: MEDICARE

## 2018-04-19 ENCOUNTER — SURGERY (OUTPATIENT)
Age: 73
End: 2018-04-19

## 2018-04-19 ENCOUNTER — ANESTHESIA EVENT (OUTPATIENT)
Dept: ENDOSCOPY | Facility: HOSPITAL | Age: 73
End: 2018-04-19
Payer: MEDICARE

## 2018-04-19 ENCOUNTER — HOSPITAL ENCOUNTER (OUTPATIENT)
Facility: HOSPITAL | Age: 73
Setting detail: HOSPITAL OUTPATIENT SURGERY
Discharge: HOME | End: 2018-04-19
Attending: INTERNAL MEDICINE | Admitting: INTERNAL MEDICINE
Payer: MEDICARE

## 2018-04-19 VITALS
RESPIRATION RATE: 16 BRPM | HEIGHT: 69 IN | WEIGHT: 271 LBS | SYSTOLIC BLOOD PRESSURE: 133 MMHG | TEMPERATURE: 98 F | OXYGEN SATURATION: 95 % | HEART RATE: 59 BPM | BODY MASS INDEX: 40.14 KG/M2 | DIASTOLIC BLOOD PRESSURE: 77 MMHG

## 2018-04-19 DIAGNOSIS — E66.01 CLASS 3 SEVERE OBESITY DUE TO EXCESS CALORIES WITH SERIOUS COMORBIDITY AND BODY MASS INDEX (BMI) OF 40.0 TO 44.9 IN ADULT (HCC): ICD-10-CM

## 2018-04-19 DIAGNOSIS — R79.89 ABNORMAL LIVER FUNCTION TESTS: ICD-10-CM

## 2018-04-19 DIAGNOSIS — R12 MILD HEARTBURN: ICD-10-CM

## 2018-04-19 PROCEDURE — 88305 TISSUE EXAM BY PATHOLOGIST: CPT | Performed by: INTERNAL MEDICINE

## 2018-04-19 PROCEDURE — 0DB78ZX EXCISION OF STOMACH, PYLORUS, VIA NATURAL OR ARTIFICIAL OPENING ENDOSCOPIC, DIAGNOSTIC: ICD-10-PCS | Performed by: INTERNAL MEDICINE

## 2018-04-19 RX ORDER — SODIUM CHLORIDE, SODIUM LACTATE, POTASSIUM CHLORIDE, CALCIUM CHLORIDE 600; 310; 30; 20 MG/100ML; MG/100ML; MG/100ML; MG/100ML
INJECTION, SOLUTION INTRAVENOUS CONTINUOUS
Status: DISCONTINUED | OUTPATIENT
Start: 2018-04-19 | End: 2018-04-19

## 2018-04-19 RX ORDER — NALOXONE HYDROCHLORIDE 0.4 MG/ML
80 INJECTION, SOLUTION INTRAMUSCULAR; INTRAVENOUS; SUBCUTANEOUS AS NEEDED
Status: DISCONTINUED | OUTPATIENT
Start: 2018-04-19 | End: 2018-04-19

## 2018-04-19 NOTE — ANESTHESIA POSTPROCEDURE EVALUATION
[address] Patient Status:  Hospital Outpatient Surgery   Age/Gender 74year old male MRN [id number]   Location [address] [place] [address]. Attending Dave Curran MD   Hosp Day # 0 PCP Dinora Calvillo MD       Anesthesia Post-

## 2018-04-19 NOTE — H&P
Doroteo Raqueluth Patient Status:  Hospital Outpatient Surgery    1945 MRN [id number]   [facility] ENDOSCOPY Attending Jm Schwarz MD   Hosp Day # 0 PCP Ron Krishnan MD     History o Stroke Father    • Dementia Father    • Heart Disorder Father    • Diabetes Mother      late onset   • Obesity Mother    • Lung Disorder Sister      smokoing related   • Heart Disorder Brother      bypass surgery   • Hypertension Brother    • Lipids Brothe disease, denies diabetes. Female complaints: Denies endometriosis, painful menstrual periods, heavy menstrual periods. Patient is not pregnant.   Psychosocial: Denies history of mental illness, denies usually feeling lonely or depressed, denies history of rare occasions, which may require future procedures. The patient indicates understanding of these issues and agrees to proceed with the scheduled procedure.    Ariel Chen  4/19/2018  12:50 PM

## 2018-04-19 NOTE — OPERATIVE REPORT
EGD operative report  Patient Name: Heidi Snow  Procedure: Esophagogastroduodenoscopy with biopsy   Indication: Heartburn; pre-operative evaluation prior to bariatric surgery  Attending: Zuleyka Barnett M.D.   Consent:  The risks, benefits, and al and angularis were normal, without masses, polyps, ulcers, erosions, diverticula, or varices. Biopsies were obtained from the antrum, body, and fundus, to evaluate for H.pylori.    Duodenum: The duodenal bulb, post-bulbar duodenum, and descending duodenum

## 2018-04-19 NOTE — ANESTHESIA PREPROCEDURE EVALUATION
PRE-OP EVALUATION    Patient Name: Delfina Ogden    Pre-op Diagnosis: Abnormal liver function tests [R94.5]  Mild heartburn [R12]  Class 3 severe obesity due to excess calories with serious comorbidity and body mass index (BMI) of 40.0 to 44.9 in adul LIVER  1/29/2014: PATIENT DOCUMENTED NOT TO HAVE EXPERIENCED ANY*  3/11/2014: PATIENT DOCUMENTED NOT TO HAVE EXPERIENCED ANY*      Comment: Procedure: LUMBAR EPIDURAL;  Surgeon: Sandi Childers MD;  Location: [address] [place] [address]

## 2018-04-20 RX ORDER — ATORVASTATIN CALCIUM 40 MG/1
1 TABLET, FILM COATED ORAL DAILY
COMMUNITY
Start: 2018-01-29 | End: 2018-05-08

## 2018-04-20 RX ORDER — ALPRAZOLAM 1 MG/1
1 TABLET ORAL AS NEEDED
Refills: 1 | COMMUNITY
Start: 2018-04-10 | End: 2018-06-18

## 2018-04-23 ENCOUNTER — TELEPHONE (OUTPATIENT)
Dept: FAMILY MEDICINE CLINIC | Facility: CLINIC | Age: 73
End: 2018-04-23

## 2018-04-23 NOTE — TELEPHONE ENCOUNTER
Pt had EGD on 04/19/18 with Dr Otis Uriostegui. Pt wants to know if report received before he come appt 04/25/18 with you. Do you have report ? Routed to DR Rudolph Camarillo.

## 2018-04-23 NOTE — TELEPHONE ENCOUNTER
Patient is scheduled for Wednesday to see Dr. Alcides King.  Patient is requesting a call back to make sure Dr. Alcides King has records from his procedure he had done on 4/19

## 2018-04-25 ENCOUNTER — OFFICE VISIT (OUTPATIENT)
Dept: FAMILY MEDICINE CLINIC | Facility: CLINIC | Age: 73
End: 2018-04-25

## 2018-04-25 VITALS
HEART RATE: 80 BPM | SYSTOLIC BLOOD PRESSURE: 126 MMHG | DIASTOLIC BLOOD PRESSURE: 70 MMHG | RESPIRATION RATE: 16 BRPM | WEIGHT: 266 LBS | BODY MASS INDEX: 39.85 KG/M2 | TEMPERATURE: 98 F | HEIGHT: 68.5 IN

## 2018-04-25 DIAGNOSIS — E78.2 HYPERCHOLESTEROLEMIA WITH HYPERTRIGLYCERIDEMIA: ICD-10-CM

## 2018-04-25 DIAGNOSIS — E66.01 SEVERE OBESITY (BMI 35.0-39.9): Primary | ICD-10-CM

## 2018-04-25 DIAGNOSIS — K75.81 STEATOHEPATITIS: ICD-10-CM

## 2018-04-25 DIAGNOSIS — R73.03 PREDIABETES: ICD-10-CM

## 2018-04-25 DIAGNOSIS — I10 ESSENTIAL HYPERTENSION: ICD-10-CM

## 2018-04-25 DIAGNOSIS — F43.10 PTSD (POST-TRAUMATIC STRESS DISORDER): ICD-10-CM

## 2018-04-25 PROCEDURE — 99214 OFFICE O/P EST MOD 30 MIN: CPT | Performed by: FAMILY MEDICINE

## 2018-04-25 NOTE — PROGRESS NOTES
Patient presents with: Follow - Up     HPI:   Katrin Keys is a 74year old male who presents to the office for routine check up. Liver biopsy 2 weeks ago, and endoscopy. Needs referrals to see Mary Penny and the bariatric dietician. normal, no pedal edema, no clubbing or cyanosis    Component AST (SGOT) ALT (SGPT)   Latest Ref Rng & Units 15 - 41 U/L 17 - 63 U/L   3/19/2018 114 (H) 289 (H)   3/12/2018 139 (H) 389 (H)   3/7/2018 182 (H) 535 (H)   2/16/2018 274 (H) 482 (H)   10/24/2017 diet.        Cristina Pretty M.D.   EMG 3  04/25/18

## 2018-05-01 ENCOUNTER — TELEPHONE (OUTPATIENT)
Dept: FAMILY MEDICINE CLINIC | Facility: CLINIC | Age: 73
End: 2018-05-01

## 2018-05-02 ENCOUNTER — TELEPHONE (OUTPATIENT)
Dept: FAMILY MEDICINE CLINIC | Facility: CLINIC | Age: 73
End: 2018-05-02

## 2018-05-02 NOTE — TELEPHONE ENCOUNTER
Left message for patient on his mobile phone number that his referral to Dr. Evelio Yi is approved/dp

## 2018-05-08 ENCOUNTER — OFFICE VISIT (OUTPATIENT)
Dept: SURGERY | Facility: CLINIC | Age: 73
End: 2018-05-08

## 2018-05-08 VITALS — HEIGHT: 68 IN | BODY MASS INDEX: 40.6 KG/M2 | WEIGHT: 267.88 LBS

## 2018-05-08 VITALS
SYSTOLIC BLOOD PRESSURE: 139 MMHG | WEIGHT: 267.88 LBS | BODY MASS INDEX: 40.6 KG/M2 | HEIGHT: 68 IN | HEART RATE: 82 BPM | DIASTOLIC BLOOD PRESSURE: 88 MMHG | RESPIRATION RATE: 16 BRPM

## 2018-05-08 DIAGNOSIS — E66.01 MORBID OBESITY WITH BMI OF 40.0-44.9, ADULT (HCC): Primary | ICD-10-CM

## 2018-05-08 PROCEDURE — 97803 MED NUTRITION INDIV SUBSEQ: CPT | Performed by: DIETITIAN, REGISTERED

## 2018-05-08 NOTE — PROGRESS NOTES
[facility]  [address] [place] [address]  Dept: [phone]  Loc: [phone]    05/08/18      Bariatric Follow-up Nutrition Session    Amelia Perla is a 74year old male.      As Results  Component Value Date   B12 934 03/19/2018       Lab Results  Component Value Date   VITD 14.9 (L) 03/19/2018       Lab Results  Component Value Date/Time   THIAMINE 139 03/19/2018 01:11 PM      No results found for: VITB1    Lab Results  Component Optisource BID and Vit D  Protein supplements:  Muscle Milk and Other Pure Protein, Unjury     Activity Level: minimal and moderate  · Type: other: Bofles upper body resistance training  · Duration: 15-20 minutes  · Frequency: 4 x per week    Other:  Chika

## 2018-05-08 NOTE — PROGRESS NOTES
[address], [address] [address]  Dept: [phone]    5/8/2018    BARIATRIC EXISTING PATIENT/FOLLOW UP    HPI:  Jyoti Reynaga is a 74year old-year old male who gastrectomy June 11, patient will begin liquid diet 2 weeks prior.     Nishi Harris MD  5/8/2018

## 2018-05-09 ENCOUNTER — TELEPHONE (OUTPATIENT)
Dept: SURGERY | Facility: CLINIC | Age: 73
End: 2018-05-09

## 2018-05-14 ENCOUNTER — TELEPHONE (OUTPATIENT)
Dept: SURGERY | Facility: CLINIC | Age: 73
End: 2018-05-14

## 2018-05-14 NOTE — TELEPHONE ENCOUNTER
LM for patient to move up Liquid Protein Diet visit. Patient is pended for bariatric surgery on June 11, 2018. Please reschedule patient for Tawanna on 5/16/18 if patient is available.

## 2018-05-14 NOTE — TELEPHONE ENCOUNTER
LM for patient to reschedule Liquid Protein Diet visit. Patient is pended for surgery on 6/11/18. Please schedule for liquid protein diet session on Wednesday 5/16/18 if patient is available.

## 2018-05-21 ENCOUNTER — OFFICE VISIT (OUTPATIENT)
Dept: SURGERY | Facility: CLINIC | Age: 73
End: 2018-05-21

## 2018-05-21 VITALS — WEIGHT: 268.13 LBS | HEIGHT: 68 IN | BODY MASS INDEX: 40.64 KG/M2

## 2018-05-21 DIAGNOSIS — E66.01 MORBID OBESITY WITH BMI OF 40.0-44.9, ADULT (HCC): Primary | ICD-10-CM

## 2018-05-21 PROCEDURE — 97803 MED NUTRITION INDIV SUBSEQ: CPT | Performed by: DIETITIAN, REGISTERED

## 2018-05-21 NOTE — PROGRESS NOTES
[address]  [address] [address]  Dept: [phone]  Loc: [phone]    05/21/18    Bariatric Liquid Protein Nutrition Session    Gopi Sharpe is a 74year old male. 03/19/2018   A1C 5.3 01/03/2017    03/19/2018       Lipid Panel:    Lab Results  Component Value Date   CHOLEST 274 (H) 02/16/2018   TRIG 248 (H) 03/07/2018   HDL 31 (L) 02/16/2018    (H) 02/16/2018   VLDL 64 (H) 02/16/2018   TCHDLRATIO 8.84 supplements:  Bariatric Advantage, Ensure Active/High Protein, Muscle Milk and Other Unjury, Pure Protein     Activity Level: minimal and moderate  · Type: other: Boflex upper body resistance training  · Duration: 15-20 minutes  · Frequency: 4 x per week RD clearance for surgery?  Yes    Kale Jay, MS, RD, LDN  Face-to-face time spent with pt: 60 minutes

## 2018-05-25 ENCOUNTER — OFFICE VISIT (OUTPATIENT)
Dept: SURGERY | Facility: CLINIC | Age: 73
End: 2018-05-25

## 2018-05-25 VITALS
WEIGHT: 266.25 LBS | DIASTOLIC BLOOD PRESSURE: 80 MMHG | HEART RATE: 73 BPM | BODY MASS INDEX: 40.35 KG/M2 | SYSTOLIC BLOOD PRESSURE: 126 MMHG | RESPIRATION RATE: 18 BRPM | OXYGEN SATURATION: 95 % | HEIGHT: 68 IN

## 2018-05-25 DIAGNOSIS — E78.2 HYPERCHOLESTEROLEMIA WITH HYPERTRIGLYCERIDEMIA: ICD-10-CM

## 2018-05-25 DIAGNOSIS — E66.01 MORBID OBESITY WITH BMI OF 40.0-44.9, ADULT (HCC): ICD-10-CM

## 2018-05-25 DIAGNOSIS — I10 ESSENTIAL HYPERTENSION: Primary | ICD-10-CM

## 2018-05-25 DIAGNOSIS — R73.03 PREDIABETES: ICD-10-CM

## 2018-05-25 DIAGNOSIS — K76.0 FATTY LIVER: ICD-10-CM

## 2018-05-25 PROCEDURE — 99213 OFFICE O/P EST LOW 20 MIN: CPT | Performed by: INTERNAL MEDICINE

## 2018-05-25 NOTE — PROGRESS NOTES
[address], [address]  Dept: [phone]     Date:   2018    Patient:  Shaq Pinedo  :      1945  MRN:      [id number]    Chief Complaint: daily.   Disp:  Rfl:      Allergies:  Patient has no known allergies.      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 yes  · Average Caloric Intake:     · Average CHO Intake: 100  · Is patient exercising?  y extremities normal, atraumatic, no cyanosis or edema  Pulses: 2+ and symmetric  Skin: Skin color, texture, turgor normal. No rashes or lesions    ASSESSMENT     HYPERTENSION:  The patient's blood pressure has been well controlled.   he has been checking it

## 2018-06-06 ENCOUNTER — LAB ENCOUNTER (OUTPATIENT)
Dept: LAB | Facility: HOSPITAL | Age: 73
End: 2018-06-06
Attending: SURGERY
Payer: MEDICARE

## 2018-06-06 DIAGNOSIS — E78.2 HYPERCHOLESTEROLEMIA WITH HYPERTRIGLYCERIDEMIA: ICD-10-CM

## 2018-06-06 DIAGNOSIS — I10 ESSENTIAL HYPERTENSION: ICD-10-CM

## 2018-06-06 DIAGNOSIS — E66.01 MORBID OBESITY WITH BMI OF 40.0-44.9, ADULT (HCC): ICD-10-CM

## 2018-06-06 PROCEDURE — 36415 COLL VENOUS BLD VENIPUNCTURE: CPT

## 2018-06-06 PROCEDURE — 86901 BLOOD TYPING SEROLOGIC RH(D): CPT

## 2018-06-06 PROCEDURE — 86900 BLOOD TYPING SEROLOGIC ABO: CPT

## 2018-06-06 PROCEDURE — 86850 RBC ANTIBODY SCREEN: CPT

## 2018-06-08 NOTE — PROGRESS NOTES
[facility] Pharmacy Note: Antimicrobial Weight Dose Adjustment for: cefazolin (ANCEF)    Heidi Snow is a 74year old male who has been prescribed cefazolin (ANCEF) 2 g preop. Pt has a weight/BMI of 122.5kg.  Based on this criteria, dose will be adjusted t

## 2018-06-11 ENCOUNTER — ANESTHESIA (OUTPATIENT)
Dept: SURGERY | Facility: HOSPITAL | Age: 73
DRG: 621 | End: 2018-06-11
Payer: MEDICARE

## 2018-06-11 ENCOUNTER — SURGERY (OUTPATIENT)
Age: 73
End: 2018-06-11

## 2018-06-11 ENCOUNTER — ANESTHESIA EVENT (OUTPATIENT)
Dept: SURGERY | Facility: HOSPITAL | Age: 73
DRG: 621 | End: 2018-06-11
Payer: MEDICARE

## 2018-06-11 ENCOUNTER — HOSPITAL ENCOUNTER (INPATIENT)
Facility: HOSPITAL | Age: 73
LOS: 2 days | Discharge: HOME | DRG: 621 | End: 2018-06-13
Attending: SURGERY | Admitting: SURGERY
Payer: MEDICARE

## 2018-06-11 ENCOUNTER — TELEPHONE (OUTPATIENT)
Dept: FAMILY MEDICINE CLINIC | Facility: CLINIC | Age: 73
End: 2018-06-11

## 2018-06-11 DIAGNOSIS — E66.01 MORBID OBESITY WITH BMI OF 40.0-44.9, ADULT (HCC): Primary | ICD-10-CM

## 2018-06-11 DIAGNOSIS — E78.2 HYPERCHOLESTEROLEMIA WITH HYPERTRIGLYCERIDEMIA: ICD-10-CM

## 2018-06-11 DIAGNOSIS — I10 ESSENTIAL HYPERTENSION: ICD-10-CM

## 2018-06-11 PROCEDURE — 0DB64Z3 EXCISION OF STOMACH, PERCUTANEOUS ENDOSCOPIC APPROACH, VERTICAL: ICD-10-PCS | Performed by: SURGERY

## 2018-06-11 PROCEDURE — 99232 SBSQ HOSP IP/OBS MODERATE 35: CPT | Performed by: HOSPITALIST

## 2018-06-11 RX ORDER — SODIUM CHLORIDE, SODIUM LACTATE, POTASSIUM CHLORIDE, CALCIUM CHLORIDE 600; 310; 30; 20 MG/100ML; MG/100ML; MG/100ML; MG/100ML
INJECTION, SOLUTION INTRAVENOUS CONTINUOUS
Status: DISCONTINUED | OUTPATIENT
Start: 2018-06-11 | End: 2018-06-13

## 2018-06-11 RX ORDER — ROCURONIUM BROMIDE 10 MG/ML
INJECTION, SOLUTION INTRAVENOUS AS NEEDED
Status: DISCONTINUED | OUTPATIENT
Start: 2018-06-11 | End: 2018-06-11 | Stop reason: SURG

## 2018-06-11 RX ORDER — HEPARIN SODIUM 5000 [USP'U]/ML
5000 INJECTION, SOLUTION INTRAVENOUS; SUBCUTANEOUS ONCE
Status: COMPLETED | OUTPATIENT
Start: 2018-06-11 | End: 2018-06-11

## 2018-06-11 RX ORDER — DEXAMETHASONE SODIUM PHOSPHATE 4 MG/ML
VIAL (ML) INJECTION AS NEEDED
Status: DISCONTINUED | OUTPATIENT
Start: 2018-06-11 | End: 2018-06-11 | Stop reason: SURG

## 2018-06-11 RX ORDER — BUPIVACAINE HYDROCHLORIDE 2.5 MG/ML
INJECTION, SOLUTION EPIDURAL; INFILTRATION; INTRACAUDAL AS NEEDED
Status: DISCONTINUED | OUTPATIENT
Start: 2018-06-11 | End: 2018-06-11 | Stop reason: HOSPADM

## 2018-06-11 RX ORDER — ONDANSETRON 2 MG/ML
4 INJECTION INTRAMUSCULAR; INTRAVENOUS ONCE AS NEEDED
Status: DISCONTINUED | OUTPATIENT
Start: 2018-06-11 | End: 2018-06-11 | Stop reason: HOSPADM

## 2018-06-11 RX ORDER — PANTOPRAZOLE SODIUM 40 MG/1
40 TABLET, DELAYED RELEASE ORAL
Status: DISCONTINUED | OUTPATIENT
Start: 2018-06-12 | End: 2018-06-13

## 2018-06-11 RX ORDER — HYDROCODONE BITARTRATE AND ACETAMINOPHEN 5; 325 MG/1; MG/1
2 TABLET ORAL AS NEEDED
Status: DISCONTINUED | OUTPATIENT
Start: 2018-06-11 | End: 2018-06-11 | Stop reason: HOSPADM

## 2018-06-11 RX ORDER — HYDROMORPHONE HYDROCHLORIDE 1 MG/ML
0.4 INJECTION, SOLUTION INTRAMUSCULAR; INTRAVENOUS; SUBCUTANEOUS EVERY 2 HOUR PRN
Status: DISCONTINUED | OUTPATIENT
Start: 2018-06-11 | End: 2018-06-13

## 2018-06-11 RX ORDER — HYDROMORPHONE HYDROCHLORIDE 1 MG/ML
0.2 INJECTION, SOLUTION INTRAMUSCULAR; INTRAVENOUS; SUBCUTANEOUS EVERY 2 HOUR PRN
Status: DISCONTINUED | OUTPATIENT
Start: 2018-06-11 | End: 2018-06-13

## 2018-06-11 RX ORDER — ACETAMINOPHEN 10 MG/ML
1000 INJECTION, SOLUTION INTRAVENOUS EVERY 6 HOURS
Status: DISPENSED | OUTPATIENT
Start: 2018-06-11 | End: 2018-06-12

## 2018-06-11 RX ORDER — METOCLOPRAMIDE 10 MG/1
10 TABLET ORAL ONCE
Status: COMPLETED | OUTPATIENT
Start: 2018-06-11 | End: 2018-06-11

## 2018-06-11 RX ORDER — ONDANSETRON 2 MG/ML
4 INJECTION INTRAMUSCULAR; INTRAVENOUS EVERY 6 HOURS PRN
Status: DISCONTINUED | OUTPATIENT
Start: 2018-06-11 | End: 2018-06-13

## 2018-06-11 RX ORDER — HYDROMORPHONE HYDROCHLORIDE 1 MG/ML
0.4 INJECTION, SOLUTION INTRAMUSCULAR; INTRAVENOUS; SUBCUTANEOUS EVERY 5 MIN PRN
Status: DISCONTINUED | OUTPATIENT
Start: 2018-06-11 | End: 2018-06-11 | Stop reason: HOSPADM

## 2018-06-11 RX ORDER — SODIUM CHLORIDE 0.9 % (FLUSH) 0.9 %
10 SYRINGE (ML) INJECTION AS NEEDED
Status: DISCONTINUED | OUTPATIENT
Start: 2018-06-11 | End: 2018-06-13

## 2018-06-11 RX ORDER — ONDANSETRON 2 MG/ML
INJECTION INTRAMUSCULAR; INTRAVENOUS AS NEEDED
Status: DISCONTINUED | OUTPATIENT
Start: 2018-06-11 | End: 2018-06-11 | Stop reason: SURG

## 2018-06-11 RX ORDER — ACETAMINOPHEN 500 MG
1000 TABLET ORAL ONCE
Status: COMPLETED | OUTPATIENT
Start: 2018-06-11 | End: 2018-06-11

## 2018-06-11 RX ORDER — HALOPERIDOL 5 MG/ML
0.25 INJECTION INTRAMUSCULAR ONCE AS NEEDED
Status: DISCONTINUED | OUTPATIENT
Start: 2018-06-11 | End: 2018-06-11 | Stop reason: HOSPADM

## 2018-06-11 RX ORDER — GLYCOPYRROLATE 0.2 MG/ML
INJECTION INTRAMUSCULAR; INTRAVENOUS AS NEEDED
Status: DISCONTINUED | OUTPATIENT
Start: 2018-06-11 | End: 2018-06-11 | Stop reason: SURG

## 2018-06-11 RX ORDER — HYDROMORPHONE HYDROCHLORIDE 1 MG/ML
0.2 INJECTION, SOLUTION INTRAMUSCULAR; INTRAVENOUS; SUBCUTANEOUS EVERY 5 MIN PRN
Status: DISCONTINUED | OUTPATIENT
Start: 2018-06-11 | End: 2018-06-11 | Stop reason: HOSPADM

## 2018-06-11 RX ORDER — LIDOCAINE HYDROCHLORIDE 10 MG/ML
INJECTION, SOLUTION EPIDURAL; INFILTRATION; INTRACAUDAL; PERINEURAL AS NEEDED
Status: DISCONTINUED | OUTPATIENT
Start: 2018-06-11 | End: 2018-06-11 | Stop reason: SURG

## 2018-06-11 RX ORDER — SODIUM CHLORIDE, SODIUM LACTATE, POTASSIUM CHLORIDE, CALCIUM CHLORIDE 600; 310; 30; 20 MG/100ML; MG/100ML; MG/100ML; MG/100ML
INJECTION, SOLUTION INTRAVENOUS CONTINUOUS
Status: DISCONTINUED | OUTPATIENT
Start: 2018-06-11 | End: 2018-06-11 | Stop reason: HOSPADM

## 2018-06-11 RX ORDER — HYDROMORPHONE HYDROCHLORIDE 1 MG/ML
0.6 INJECTION, SOLUTION INTRAMUSCULAR; INTRAVENOUS; SUBCUTANEOUS EVERY 5 MIN PRN
Status: DISCONTINUED | OUTPATIENT
Start: 2018-06-11 | End: 2018-06-11 | Stop reason: HOSPADM

## 2018-06-11 RX ORDER — FAMOTIDINE 20 MG/1
20 TABLET ORAL ONCE
Status: COMPLETED | OUTPATIENT
Start: 2018-06-11 | End: 2018-06-11

## 2018-06-11 RX ORDER — HYDROCODONE BITARTRATE AND ACETAMINOPHEN 5; 325 MG/1; MG/1
1 TABLET ORAL AS NEEDED
Status: DISCONTINUED | OUTPATIENT
Start: 2018-06-11 | End: 2018-06-11 | Stop reason: HOSPADM

## 2018-06-11 RX ORDER — NALOXONE HYDROCHLORIDE 0.4 MG/ML
80 INJECTION, SOLUTION INTRAMUSCULAR; INTRAVENOUS; SUBCUTANEOUS AS NEEDED
Status: DISCONTINUED | OUTPATIENT
Start: 2018-06-11 | End: 2018-06-11 | Stop reason: HOSPADM

## 2018-06-11 RX ORDER — HYDROMORPHONE HYDROCHLORIDE 1 MG/ML
0.8 INJECTION, SOLUTION INTRAMUSCULAR; INTRAVENOUS; SUBCUTANEOUS EVERY 2 HOUR PRN
Status: DISCONTINUED | OUTPATIENT
Start: 2018-06-11 | End: 2018-06-13

## 2018-06-11 RX ORDER — MIDAZOLAM HYDROCHLORIDE 1 MG/ML
INJECTION INTRAMUSCULAR; INTRAVENOUS AS NEEDED
Status: DISCONTINUED | OUTPATIENT
Start: 2018-06-11 | End: 2018-06-11 | Stop reason: SURG

## 2018-06-11 RX ORDER — ENOXAPARIN SODIUM 100 MG/ML
40 INJECTION SUBCUTANEOUS DAILY
Status: DISCONTINUED | OUTPATIENT
Start: 2018-06-11 | End: 2018-06-13

## 2018-06-11 RX ADMIN — ONDANSETRON 4 MG: 2 INJECTION INTRAMUSCULAR; INTRAVENOUS at 10:17:00

## 2018-06-11 RX ADMIN — SODIUM CHLORIDE, SODIUM LACTATE, POTASSIUM CHLORIDE, CALCIUM CHLORIDE: 600; 310; 30; 20 INJECTION, SOLUTION INTRAVENOUS at 09:22:00

## 2018-06-11 RX ADMIN — SODIUM CHLORIDE, SODIUM LACTATE, POTASSIUM CHLORIDE, CALCIUM CHLORIDE: 600; 310; 30; 20 INJECTION, SOLUTION INTRAVENOUS at 10:14:00

## 2018-06-11 RX ADMIN — ROCURONIUM BROMIDE 10 MG: 10 INJECTION, SOLUTION INTRAVENOUS at 09:29:00

## 2018-06-11 RX ADMIN — SODIUM CHLORIDE, SODIUM LACTATE, POTASSIUM CHLORIDE, CALCIUM CHLORIDE: 600; 310; 30; 20 INJECTION, SOLUTION INTRAVENOUS at 10:15:00

## 2018-06-11 RX ADMIN — ROCURONIUM BROMIDE 30 MG: 10 INJECTION, SOLUTION INTRAVENOUS at 09:35:00

## 2018-06-11 RX ADMIN — GLYCOPYRROLATE 0.1 MG: 0.2 INJECTION INTRAMUSCULAR; INTRAVENOUS at 09:22:00

## 2018-06-11 RX ADMIN — DEXAMETHASONE SODIUM PHOSPHATE 4 MG: 4 MG/ML VIAL (ML) INJECTION at 09:35:00

## 2018-06-11 RX ADMIN — LIDOCAINE HYDROCHLORIDE 50 MG: 10 INJECTION, SOLUTION EPIDURAL; INFILTRATION; INTRACAUDAL; PERINEURAL at 09:29:00

## 2018-06-11 RX ADMIN — MIDAZOLAM HYDROCHLORIDE 1 MG: 1 INJECTION INTRAMUSCULAR; INTRAVENOUS at 09:22:00

## 2018-06-11 NOTE — ANESTHESIA PREPROCEDURE EVALUATION
Anesthesia PreOp Note    HPI:     Allyson Velazquez is a 74year old male who presents for preoperative consultation requested by: Sid Noel MD    Date of Surgery: 6/11/2018    Procedure(s):  BARIATRIC SLEEVE GASTRECTOMY LAPAROSCOPIC  Indication: Hearing impairment     bilateral hearing loss   • High cholesterol    • Hyperlipidemia    • Irregular bowel habits    • Morbid obesity with BMI of 40.0-44.9, adult (HCC)    • Night sweats    • Visual impairment     glasses   • Weight gain        Past Surgi Epic:  lactated ringers infusion  Intravenous Continuous Wallace Olmstead MD Last Rate: 20 mL/hr at 06/11/18 0804   CeFAZolin Sodium (ANCEF) 3 g in sodium chloride 0.9% 100 mL IVPB 3 g Intravenous Once Wallace Olmstead MD      No current Epic-ordered out 144/74    BP Location:  Right arm    Pulse:  69    Resp:  18    Temp:  97.8 °F (36.6 °C)    TempSrc:  Oral    SpO2:  95%    Weight: 122.5 kg (270 lb)  116.1 kg (255 lb 14.4 oz)   Height: 1.753 m (5' 9\") 1.765 m (5' 9.5\")         Anesthesia ROS/Med Hx and

## 2018-06-11 NOTE — PLAN OF CARE
DISCHARGE PLANNING    • Discharge to home or other facility with appropriate resources Progressing        GASTROINTESTINAL - ADULT    • Achieves appropriate nutritional intake (bariatric) Progressing        PAIN - ADULT    • Verbalizes/displays adequate co

## 2018-06-11 NOTE — H&P
[address] Patient Status:  Surgery Admit    1945 MRN [id number]   Location [facility] Attending Fabio Worrell MD   Hosp Day # 0 PCP Jessica Gorman MD     Raomn LIVER  1/29/2014: PATIENT DOCUMENTED NOT TO HAVE EXPERIENCED ANY*  3/11/2014: PATIENT DOCUMENTED NOT TO HAVE EXPERIENCED ANY*      Comment: Procedure: LUMBAR EPIDURAL;  Surgeon: Karol Soto MD;  Location: [address] [place] [address] moist. Pupils are equal and round, reactive to light and accommodate. Pupils are approximately 3mm and react to 2mm with reaction to light. Oropharynx is clear. Neck: No tenderness to palpitation.   Full range of motion to flexion and extension, lateral multiples small 1yr CT f/u     Ectatic abdominal aorta [facility])     Thoracic aorta atherosclerosis ([address]     Encounter for pre-bariatric surgery counseling and education     Essential hypertension     Fatty liver     Abnormal liver function tests     Morbid o

## 2018-06-11 NOTE — ANESTHESIA POSTPROCEDURE EVALUATION
Patient: Jyoti Reynaga    Procedure Summary     Date:  06/11/18 Room / Location:  [address] [place] [address] MAIN OR 01 / [address] [place] [address] MAIN OR    Anesthesia Start:  7826 Anesthesia Stop:  7455    Procedure:  BARIATRIC SLEEVE GASTRECTOMY LAPAROSCOPIC (N/A ) Diagnosis:  (Morbid obesit

## 2018-06-11 NOTE — BRIEF OP NOTE
Pre-Operative Diagnosis: Morbid obesity, hypertension, hypercholesterolemia      Post-Operative Diagnosis: Morbid obesity, hypertension, hypercholesterolemia       Procedure Performed:   Procedure(s):  Laparoscopic sleeve gastrectomy     Surgeon(s) and Rol

## 2018-06-11 NOTE — PROGRESS NOTES
[facility] HOSP - [facility]    Progress Note    Svitlana Geiger Patient Status:  Surgery Admit    1945 MRN [id number]   Location [facility] UNIT Attending Dion Tang MD   Hosp Day # 0 PCP Justin Montoya MD 04/09/2018   HGB 18.9 (H) 04/09/2018   HCT 51.8 04/09/2018   .0 (L) 04/09/2018   CREATSERUM 0.98 03/19/2018   BUN 15 03/19/2018    03/19/2018   K 3.8 03/19/2018    03/19/2018   CO2 24.0 03/19/2018    (H) 03/19/2018   CA 9.2 03/19/

## 2018-06-11 NOTE — OPERATIVE REPORT
Ene Ramos / Eddie Blanc / Ludmila Garnett / Ronna Guillaume / Nazia Funez / [facility] - [phone]  Answering Service [phone]        Preop Diagnosis: Morbid Obesity secondary to excess calories   Postop Diagnosis: Morbid began by mobilizing the greater curvature of the stomach using a LigaSure device. We took this dissection all the way up along the greater curve and mobilize the entire fundus.   The angle of His was carefully dissected out identifying the anterior border The sleeve gastrectomy was extracted for the right paramedian trocar site. Trochars were removed under direct vision. The fascia the extraction site was closed with two figure-of-eight 0 PDS sutures.   Extraction site port was closed with a running 3-0 Vi

## 2018-06-12 PROCEDURE — 99232 SBSQ HOSP IP/OBS MODERATE 35: CPT | Performed by: HOSPITALIST

## 2018-06-12 NOTE — PROGRESS NOTES
Izzy Cobb / Bernardino Ricks / Gwen Patton / Valentín Oakes / Buddy Morrissey / [place] - [phone]  Answering Service [phone]      Progress Note    Marvin Ballard Patient Status:  Inpatient    1945 MRN [id number] HCT 44.4 06/12/2018    (L) 06/12/2018    06/12/2018   K 4.3 06/12/2018   CO2 25 06/12/2018   BUN 15 06/12/2018    (H) 06/12/2018       Imaging:        Assessment/Plan:  s/p sleeve gastrectomy, doing well.   Clears today, anticipate d/c

## 2018-06-12 NOTE — CM/SW NOTE
06/12/18 1500   CM/SW Screening   Referral [facility] staff; Chart review   Patient's Mental Status Oriented; Alert   Patient's [address]   Patient lives with Spouse   Patient Status Prior to Admission   Inde

## 2018-06-12 NOTE — BH NUTRITION NOTE
Bariatric Inpatient Nutrition Note      David Reynolds is a 74year old male.     Procedure: Sleeve gastrectomy  Surgery Date: 6/11/2018  Medical Diagnosis: Morbid obesity    Height:  Ht Readings from Last 1 Encounters:  06/11/18 : 5' 9.5\" (1.765 m) [COMPLETED] acetaminophen (TYLENOL EXTRA STRENGTH) tab 1,000 mg 1,000 mg Oral Once   [COMPLETED] famoTIDine (PEPCID) tab 20 mg 20 mg Oral Once   [COMPLETED] Metoclopramide HCl (REGLAN) tab 10 mg 10 mg Oral Once   [COMPLETED] Heparin Sodium (Porcine) 5000

## 2018-06-12 NOTE — CM/SW NOTE
Pt is alert/oriented. Post op laparoscopic gastric sleeve. Tolerating liquids today. Discharge plan: anticipate discharge to home tomorrow with spouse.

## 2018-06-12 NOTE — PROGRESS NOTES
[facility]  Hospitalist Progress  Note     Jyoti Reynaga Patient Status:  Inpatient      74year old [facility] [id number]   Location 427/427-A Attending Steve Gibson MD   Hosp Day # 1 PCP Agustín Cabrera MD     ASSESSMENT/PLAN HYDROcodone-acetaminophen, ondansetron HCl, HYDROmorphone HCl **OR** HYDROmorphone HCl **OR** HYDROmorphone HCl

## 2018-06-13 VITALS
BODY MASS INDEX: 37.05 KG/M2 | SYSTOLIC BLOOD PRESSURE: 152 MMHG | OXYGEN SATURATION: 91 % | HEIGHT: 69.5 IN | TEMPERATURE: 99 F | DIASTOLIC BLOOD PRESSURE: 73 MMHG | WEIGHT: 255.88 LBS | HEART RATE: 59 BPM | RESPIRATION RATE: 18 BRPM

## 2018-06-13 PROCEDURE — 99238 HOSP IP/OBS DSCHRG MGMT 30/<: CPT | Performed by: HOSPITALIST

## 2018-06-13 RX ORDER — PANTOPRAZOLE SODIUM 40 MG/1
40 TABLET, DELAYED RELEASE ORAL
Qty: 30 TABLET | Refills: 0 | Status: SHIPPED
Start: 2018-06-14 | End: 2018-10-22

## 2018-06-13 NOTE — PLAN OF CARE
DISCHARGE PLANNING    • Discharge to home or other facility with appropriate resources Adequate for Discharge        Home today with family.     GASTROINTESTINAL - ADULT    • Achieves appropriate nutritional intake (bariatric) Adequate for Discharge

## 2018-06-13 NOTE — DISCHARGE SUMMARY
[facility] HOSPITALIST  DISCHARGE SUMMARY     Dossimaria dolores Naik Patient Status:  Inpatient    1945 MRN [id number]   Location [facility] 4W/SW/SE Attending No att. providers found   Hosp Day # 2 PCP Jessy Banks MD     Date of Admission:  medications    aspirin 81 MG Tabs        Flurazepam HCl 30 MG Caps  Commonly known as:  [facility] OR        MULTIVITAMIN & MINERAL OR              Where to Get Your Medications      Information about where to get these medications is not yet a

## 2018-06-13 NOTE — PROGRESS NOTES
Rasheed Perez / Paula Pennington / Nirali Taylor / Brii Stokes / Nikia Herrera / Kecia Show - [phone]  Answering Service [phone]      Progress Note    Sjmaria dolores Ogden Patient Status:  Inpatient    1945 MRN [id number] 06/12/2018    06/12/2018   K 4.3 06/12/2018   CO2 25 06/12/2018   BUN 15 06/12/2018    (H) 06/12/2018       Imaging:        Assessment/Plan:  s/p sleeve gastrectomy, doing well.     - cont liquids  - importance of hydration discussed with pt wh

## 2018-06-18 ENCOUNTER — TELEPHONE (OUTPATIENT)
Dept: FAMILY MEDICINE CLINIC | Facility: CLINIC | Age: 73
End: 2018-06-18

## 2018-06-18 RX ORDER — ACETAMINOPHEN AND CODEINE PHOSPHATE 120; 12 MG/5ML; MG/5ML
SOLUTION ORAL
Qty: 30 CAPSULE | Refills: 0 | OUTPATIENT
Start: 2018-06-18 | End: 2018-07-13

## 2018-06-18 RX ORDER — ALPRAZOLAM 1 MG/1
TABLET ORAL
Qty: 60 TABLET | Refills: 0 | OUTPATIENT
Start: 2018-06-18 | End: 2018-07-13

## 2018-06-19 ENCOUNTER — OFFICE VISIT (OUTPATIENT)
Dept: SURGERY | Facility: CLINIC | Age: 73
End: 2018-06-19

## 2018-06-19 VITALS
TEMPERATURE: 97 F | SYSTOLIC BLOOD PRESSURE: 129 MMHG | HEIGHT: 68 IN | RESPIRATION RATE: 16 BRPM | WEIGHT: 252.88 LBS | BODY MASS INDEX: 38.32 KG/M2 | DIASTOLIC BLOOD PRESSURE: 77 MMHG | HEART RATE: 58 BPM | OXYGEN SATURATION: 95 %

## 2018-06-19 NOTE — PROGRESS NOTES
[address], [address] [address]  Dept: [phone]    6/19/2018    BARIATRIC EXISTING PATIENT/FOLLOW UP    HPI:  Navid Lee is a 74year old-year old male who

## 2018-06-21 ENCOUNTER — TELEPHONE (OUTPATIENT)
Dept: SURGERY | Facility: CLINIC | Age: 73
End: 2018-06-21

## 2018-06-21 NOTE — TELEPHONE ENCOUNTER
Called patient for post-op check in. Patient doing well. Tolerating pureed diet and able to drink 64 ounces fluid. Denies any sob, tachy, fever, chest pain; denies any redness or drainage at incisions. Walking as much as able. All f/u appts scheduled.

## 2018-06-28 ENCOUNTER — OFFICE VISIT (OUTPATIENT)
Dept: SURGERY | Facility: CLINIC | Age: 73
End: 2018-06-28

## 2018-06-28 VITALS — BODY MASS INDEX: 38.19 KG/M2 | WEIGHT: 252 LBS | HEIGHT: 68 IN

## 2018-06-28 DIAGNOSIS — E66.01 SEVERE OBESITY (BMI 35.0-39.9): ICD-10-CM

## 2018-06-28 PROCEDURE — 97803 MED NUTRITION INDIV SUBSEQ: CPT | Performed by: DIETITIAN, REGISTERED

## 2018-06-28 NOTE — PROGRESS NOTES
[facility] AND [facility] LOSS CLINIC  [address] [place] [id number]  Dept: [phone]  Loc: [phone]    06/28/18      Bariatric Follow-up Nutrition Session    Heidi Edy is a 74year old male.      As [address] (H) 02/16/2018        Vitamins/Minerals:    Lab Results  Component Value Date   B12 934 03/19/2018       Lab Results  Component Value Date   VITD 14.9 (L) 03/19/2018       Lab Results  Component Value Date/Time   THIAMINE 139 03/19/2018 01:11 reported  Vitamin/mineral supplements:  Bariatric Choice QID  Protein supplements:  Muscle Milk, Premier Protein and Whey Protein     Activity Level: minimal  · Type: walk  · Duration: 10 minutes  · Frequency: per day    Other:  Patient feeling well post-o

## 2018-06-29 ENCOUNTER — OFFICE VISIT (OUTPATIENT)
Dept: INTERNAL MEDICINE CLINIC | Facility: CLINIC | Age: 73
End: 2018-06-29

## 2018-06-29 VITALS
HEART RATE: 60 BPM | RESPIRATION RATE: 16 BRPM | WEIGHT: 253 LBS | DIASTOLIC BLOOD PRESSURE: 70 MMHG | SYSTOLIC BLOOD PRESSURE: 122 MMHG | BODY MASS INDEX: 36.63 KG/M2 | HEIGHT: 69.5 IN

## 2018-06-29 DIAGNOSIS — Z98.84 STATUS POST LAPAROSCOPIC SLEEVE GASTRECTOMY: Primary | ICD-10-CM

## 2018-06-29 DIAGNOSIS — E66.01 SEVERE OBESITY (BMI 35.0-39.9): ICD-10-CM

## 2018-06-29 DIAGNOSIS — I10 ESSENTIAL HYPERTENSION: ICD-10-CM

## 2018-06-29 DIAGNOSIS — K76.0 FATTY LIVER: ICD-10-CM

## 2018-06-29 DIAGNOSIS — R73.03 PREDIABETES: ICD-10-CM

## 2018-06-29 DIAGNOSIS — E66.01 MORBID OBESITY WITH BMI OF 40.0-44.9, ADULT (HCC): ICD-10-CM

## 2018-06-29 DIAGNOSIS — F43.10 PTSD (POST-TRAUMATIC STRESS DISORDER): ICD-10-CM

## 2018-06-29 PROCEDURE — 99213 OFFICE O/P EST LOW 20 MIN: CPT | Performed by: NURSE PRACTITIONER

## 2018-06-29 NOTE — PATIENT INSTRUCTIONS
Plan:  Will call Dr. Rohit Bueno regarding pain medication suggestion and will give you a call after we speak with them  F/u with Dr. Rohit Bueno next week   Follow up with me in 3 month    Please try to work on the following dietary changes:  1.  Drink lots of water

## 2018-07-02 ENCOUNTER — TELEPHONE (OUTPATIENT)
Dept: SURGERY | Facility: CLINIC | Age: 73
End: 2018-07-02

## 2018-07-02 PROBLEM — D69.6 THROMBOCYTOPENIA (HCC): Chronic | Status: ACTIVE | Noted: 2018-07-02

## 2018-07-03 ENCOUNTER — OFFICE VISIT (OUTPATIENT)
Dept: SURGERY | Facility: CLINIC | Age: 73
End: 2018-07-03

## 2018-07-03 ENCOUNTER — TELEPHONE (OUTPATIENT)
Dept: SURGERY | Facility: CLINIC | Age: 73
End: 2018-07-03

## 2018-07-03 VITALS
HEART RATE: 92 BPM | HEIGHT: 68 IN | RESPIRATION RATE: 16 BRPM | DIASTOLIC BLOOD PRESSURE: 75 MMHG | SYSTOLIC BLOOD PRESSURE: 114 MMHG | BODY MASS INDEX: 37.64 KG/M2 | WEIGHT: 248.38 LBS

## 2018-07-03 NOTE — PROGRESS NOTES
[address], [address] [address]  Dept: [phone]    7/3/2018    BARIATRIC EXISTING PATIENT/FOLLOW UP    HPI:  Mayra Garcia is a 74year old-year old male who

## 2018-07-13 ENCOUNTER — OFFICE VISIT (OUTPATIENT)
Dept: FAMILY MEDICINE CLINIC | Facility: CLINIC | Age: 73
End: 2018-07-13

## 2018-07-13 VITALS
HEART RATE: 82 BPM | WEIGHT: 246 LBS | RESPIRATION RATE: 16 BRPM | SYSTOLIC BLOOD PRESSURE: 100 MMHG | HEIGHT: 69.5 IN | DIASTOLIC BLOOD PRESSURE: 60 MMHG | TEMPERATURE: 98 F | BODY MASS INDEX: 35.62 KG/M2

## 2018-07-13 DIAGNOSIS — R73.03 PREDIABETES: ICD-10-CM

## 2018-07-13 DIAGNOSIS — Z98.1 S/P LUMBAR FUSION: ICD-10-CM

## 2018-07-13 DIAGNOSIS — Z00.00 ENCOUNTER FOR ANNUAL HEALTH EXAMINATION: Primary | ICD-10-CM

## 2018-07-13 DIAGNOSIS — I70.0 THORACIC AORTA ATHEROSCLEROSIS (HCC): ICD-10-CM

## 2018-07-13 DIAGNOSIS — R91.8 PULMONARY NODULES: ICD-10-CM

## 2018-07-13 DIAGNOSIS — E78.2 HYPERCHOLESTEROLEMIA WITH HYPERTRIGLYCERIDEMIA: ICD-10-CM

## 2018-07-13 DIAGNOSIS — I77.811 ECTATIC ABDOMINAL AORTA (HCC): ICD-10-CM

## 2018-07-13 DIAGNOSIS — F43.10 PTSD (POST-TRAUMATIC STRESS DISORDER): ICD-10-CM

## 2018-07-13 DIAGNOSIS — D69.6 THROMBOCYTOPENIA (HCC): ICD-10-CM

## 2018-07-13 DIAGNOSIS — G47.01 INSOMNIA DUE TO MEDICAL CONDITION: ICD-10-CM

## 2018-07-13 DIAGNOSIS — Z98.84 S/P LAPAROSCOPIC SLEEVE GASTRECTOMY: ICD-10-CM

## 2018-07-13 DIAGNOSIS — M06.9 RHEUMATOID ARTHRITIS, INVOLVING UNSPECIFIED SITE, UNSPECIFIED RHEUMATOID FACTOR PRESENCE: ICD-10-CM

## 2018-07-13 DIAGNOSIS — R53.82 CHRONIC FATIGUE SYNDROME: ICD-10-CM

## 2018-07-13 DIAGNOSIS — K58.2 IRRITABLE BOWEL SYNDROME WITH BOTH CONSTIPATION AND DIARRHEA: ICD-10-CM

## 2018-07-13 DIAGNOSIS — E66.01 SEVERE OBESITY (BMI 35.0-35.9 WITH COMORBIDITY) (HCC): ICD-10-CM

## 2018-07-13 DIAGNOSIS — K76.0 FATTY LIVER: ICD-10-CM

## 2018-07-13 DIAGNOSIS — I10 ESSENTIAL HYPERTENSION: ICD-10-CM

## 2018-07-13 DIAGNOSIS — M48.061 LUMBAR FORAMINAL STENOSIS: ICD-10-CM

## 2018-07-13 PROBLEM — Z71.89 ENCOUNTER FOR PRE-BARIATRIC SURGERY COUNSELING AND EDUCATION: Status: RESOLVED | Noted: 2018-01-02 | Resolved: 2018-07-13

## 2018-07-13 PROCEDURE — 96160 PT-FOCUSED HLTH RISK ASSMT: CPT | Performed by: FAMILY MEDICINE

## 2018-07-13 PROCEDURE — G0438 PPPS, INITIAL VISIT: HCPCS | Performed by: FAMILY MEDICINE

## 2018-07-13 RX ORDER — ACETAMINOPHEN AND CODEINE PHOSPHATE 120; 12 MG/5ML; MG/5ML
SOLUTION ORAL
Qty: 30 CAPSULE | Refills: 5 | Status: SHIPPED | OUTPATIENT
Start: 2018-07-13 | End: 2019-03-12

## 2018-07-13 RX ORDER — ALPRAZOLAM 1 MG/1
TABLET ORAL
Qty: 60 TABLET | Refills: 5 | Status: SHIPPED | OUTPATIENT
Start: 2018-07-13 | End: 2019-03-12

## 2018-07-13 NOTE — PROGRESS NOTES
HPI:   Chela Steven is a 74year old male who presents for a MA (Medicare Advantage) [facility] (Once per calendar year). Preventative Screening  Colonoscopy - now 74. No indication. Prostate - no symptoms. Immunizations - PNA 13 and 23.   Flu does have a Living Will but we do NOT have it on file in [address] [place] [address] Rd.    Advance care planning including the explanation and discussion of advance directives standard forms performed Face to Face with patient and Family/surrogate (if present), and forms available lb  07/03/18 : 248 lb 6.4 oz  06/29/18 : 253 lb     Last Cholesterol Labs:     Lab Results  Component Value Date   CHOLEST 274 (H) 02/16/2018   HDL 31 (L) 02/16/2018    (H) 02/16/2018   TRIG 248 (H) 03/07/2018          Last Chemistry Labs:     Lab R ndl/cath spi dx/ther njx (3/11/2014); m-sedaj by mateo phys perfrmg svc 5+ yr (3/11/2014); patient withough preoperative order for iv antibiotic surgical site infection prophylaxis.  (3/11/2014); patient documented not to have experienced any of the following Alert, cooperative, no distress, appears stated age.   obese   Head:  Normocephalic, without obvious abnormality, atraumatic   Eyes:  PERRL, conjunctiva/corneas clear, EOM's intact   Nose: Nares normal, septum midline, mucosa normal, no drainage or sinus te clinical bleeding. 4. Severe obesity (BMI 35.0-35.9 with comorbidity) ([address]  Improving with bariatric. Continue diet. 5. PTSD (post-traumatic stress disorder)  Persists. On disability as a result.   Letter completed today for Office of Workers Com control            Diet assessment: good     PLAN:  The patient indicates understanding of these issues and agrees to the plan. Reinforced healthy diet, lifestyle, and exercise.     Return in 6 months (on 1/13/2019).  - 6.mo    Pia Robert MD, 7/13/20 (Update Immunization Activity if applicable)     Influenza  Covered Annually 1/2/2018   Please get every year    Pneumococcal 13 (Prevnar)  Covered Once after 65 12/20/2016 Please get once after your 65th birthday    Pneumococcal 23 (Pneumovax)  Covered On

## 2018-07-13 NOTE — PATIENT INSTRUCTIONS
Marjan Meza's SCREENING SCHEDULE   Tests on this list are recommended by your physician but may not be covered, or covered at this frequency, by your insurer. Please check with your insurance carrier before scheduling to verify coverage.     PREVEN Age 74     Colonoscopy Screen   Covered every 10 years- more often if abnormal Colonoscopy,10 Years due on 07/09/1993 Update Health Maintenance if applicable    Flex Sigmoidoscopy Screen  Covered every 5 years No results found for this or any previous visi your prescription benefits, but Medicare does not cover unless Medically needed    Zoster (Not covered by Medicare Part B) No orders found for this or any previous visit.  This may be covered with your pharmacy  prescription benefits     Recommended Website

## 2018-08-07 ENCOUNTER — OFFICE VISIT (OUTPATIENT)
Dept: SURGERY | Facility: CLINIC | Age: 73
End: 2018-08-07
Payer: MEDICARE

## 2018-08-07 VITALS
HEIGHT: 68 IN | SYSTOLIC BLOOD PRESSURE: 129 MMHG | BODY MASS INDEX: 38.06 KG/M2 | RESPIRATION RATE: 16 BRPM | WEIGHT: 251.13 LBS | DIASTOLIC BLOOD PRESSURE: 76 MMHG | HEART RATE: 71 BPM

## 2018-08-07 RX ORDER — MV,CALCIUM,MIN/IRON/FOLIC/VITK 9-200-40
TABLET,CHEWABLE ORAL
COMMUNITY
End: 2021-03-03 | Stop reason: ALTCHOICE

## 2018-08-07 NOTE — PROGRESS NOTES
[address], [address] [address]  Dept: [phone]    8/7/2018    BARIATRIC EXISTING PATIENT/FOLLOW UP    HPI:  Enrike Roberts is a 74year old-year old male who

## 2018-08-20 DIAGNOSIS — G47.01 INSOMNIA DUE TO MEDICAL CONDITION: ICD-10-CM

## 2018-08-20 DIAGNOSIS — F43.10 PTSD (POST-TRAUMATIC STRESS DISORDER): ICD-10-CM

## 2018-08-20 RX ORDER — ALPRAZOLAM 1 MG/1
TABLET ORAL
Qty: 60 TABLET | Refills: 0 | OUTPATIENT
Start: 2018-08-20

## 2018-08-20 NOTE — TELEPHONE ENCOUNTER
Received refill request from [facility]ony for Alprazolam 1mg. This medication was refilled at an appointment by [facility] #60 with 5 refills. Spoke with pharmacist.  He said there are refills available. This request was denied.

## 2018-09-23 NOTE — PROGRESS NOTES
HISTORY OF PRESENT ILLNESS  Patient presents with:  Weight Check: up 4 lbs    David Reynolds is a 74year old male here for follow up with medical weight loss program for the treatment of overweight, obesity, or morbid obesity.  Patient has gained -# 4 non tender, no adenopathy, no thyromegaly  LUNGS: CTA in all fields, breathing non labored  CARDIO: RRR without murmur, normal S1 and S2 without clicks or gallops, no pedal edema.    GI: rotund, no masses, HSM or tenderness, incision site healing well, CDI MG Oral Tab TAKE 1 TABLET BY MOUTH TWICE DAILY AS NEEDED FOR SLEEP Disp: 60 tablet Rfl: 5   Flurazepam HCl 30 MG Oral Cap TAKE ONE CAPSULE BY MOUTH EVERY NIGHT AT BEDTIME AS NEEDED FOR SLEEP Disp: 30 capsule Rfl: 5   Pantoprazole Sodium 40 MG Oral Tab EC T 40.0-44.9, adult ([facility]  Plan: DIETITIAN EDUCATION INITIAL, DIET (INTERNAL),         VITAMIN D, 25-HYDROXY, IRON AND TIBC, FERRITIN,        PHOSPHORUS, MAGNESIUM, VITAMIN B1 (THIAMINE),         BLOOD, CBC WITH DIFFERENTIAL WITH PLATELET,         COMP METABO

## 2018-09-24 ENCOUNTER — LABORATORY ENCOUNTER (OUTPATIENT)
Dept: LAB | Age: 73
End: 2018-09-24
Attending: NURSE PRACTITIONER
Payer: MEDICARE

## 2018-09-24 ENCOUNTER — OFFICE VISIT (OUTPATIENT)
Dept: INTERNAL MEDICINE CLINIC | Facility: CLINIC | Age: 73
End: 2018-09-24
Payer: MEDICARE

## 2018-09-24 VITALS
WEIGHT: 257 LBS | HEART RATE: 86 BPM | HEIGHT: 69 IN | SYSTOLIC BLOOD PRESSURE: 118 MMHG | RESPIRATION RATE: 16 BRPM | DIASTOLIC BLOOD PRESSURE: 76 MMHG | BODY MASS INDEX: 38.06 KG/M2

## 2018-09-24 DIAGNOSIS — Z51.81 THERAPEUTIC DRUG MONITORING: ICD-10-CM

## 2018-09-24 DIAGNOSIS — Z98.84 STATUS POST LAPAROSCOPIC SLEEVE GASTRECTOMY: ICD-10-CM

## 2018-09-24 DIAGNOSIS — Z51.81 THERAPEUTIC DRUG MONITORING: Primary | ICD-10-CM

## 2018-09-24 DIAGNOSIS — K76.0 FATTY LIVER: ICD-10-CM

## 2018-09-24 DIAGNOSIS — I10 ESSENTIAL HYPERTENSION: ICD-10-CM

## 2018-09-24 DIAGNOSIS — E66.01 MORBID OBESITY WITH BMI OF 40.0-44.9, ADULT (HCC): ICD-10-CM

## 2018-09-24 DIAGNOSIS — R73.03 PREDIABETES: ICD-10-CM

## 2018-09-24 DIAGNOSIS — G47.33 OSA (OBSTRUCTIVE SLEEP APNEA): ICD-10-CM

## 2018-09-24 LAB
ALBUMIN SERPL-MCNC: 4.1 G/DL (ref 3.5–4.8)
ALBUMIN/GLOB SERPL: 1.1 {RATIO} (ref 1–2)
ALP LIVER SERPL-CCNC: 87 U/L (ref 45–117)
ALT SERPL-CCNC: 99 U/L (ref 17–63)
ANION GAP SERPL CALC-SCNC: 8 MMOL/L (ref 0–18)
AST SERPL-CCNC: 53 U/L (ref 15–41)
BASOPHILS # BLD AUTO: 0.04 X10(3) UL (ref 0–0.1)
BASOPHILS NFR BLD AUTO: 0.5 %
BILIRUB SERPL-MCNC: 0.8 MG/DL (ref 0.1–2)
BUN BLD-MCNC: 15 MG/DL (ref 8–20)
BUN/CREAT SERPL: 14.6 (ref 10–20)
CALCIUM BLD-MCNC: 9.4 MG/DL (ref 8.3–10.3)
CHLORIDE SERPL-SCNC: 107 MMOL/L (ref 101–111)
CHOLEST SMN-MCNC: 246 MG/DL (ref ?–200)
CO2 SERPL-SCNC: 27 MMOL/L (ref 22–32)
CREAT BLD-MCNC: 1.03 MG/DL (ref 0.7–1.3)
DEPRECATED HBV CORE AB SER IA-ACNC: 431 NG/ML (ref 30–530)
EOSINOPHIL # BLD AUTO: 0.14 X10(3) UL (ref 0–0.3)
EOSINOPHIL NFR BLD AUTO: 1.9 %
ERYTHROCYTE [DISTWIDTH] IN BLOOD BY AUTOMATED COUNT: 13 % (ref 11.5–16)
EST. AVERAGE GLUCOSE BLD GHB EST-MCNC: 97 MG/DL (ref 68–126)
FOLATE SERPL-MCNC: 31.9 NG/ML (ref 5.9–?)
GLOBULIN PLAS-MCNC: 3.6 G/DL (ref 2.5–4)
GLUCOSE BLD-MCNC: 118 MG/DL (ref 70–99)
HAV AB SERPL IA-ACNC: 750 PG/ML (ref 193–986)
HAV IGM SER QL: 2.2 MG/DL (ref 1.8–2.5)
HBA1C MFR BLD HPLC: 5 % (ref ?–5.7)
HCT VFR BLD AUTO: 53.1 % (ref 37–53)
HDLC SERPL-MCNC: 36 MG/DL (ref 40–59)
HGB BLD-MCNC: 18.7 G/DL (ref 13–17)
IMMATURE GRANULOCYTE COUNT: 0.04 X10(3) UL (ref 0–1)
IMMATURE GRANULOCYTE RATIO %: 0.5 %
IRON SATURATION: 37 % (ref 20–50)
IRON: 140 UG/DL (ref 45–182)
LDLC SERPL CALC-MCNC: 149 MG/DL (ref ?–100)
LYMPHOCYTES # BLD AUTO: 1.68 X10(3) UL (ref 0.9–4)
LYMPHOCYTES NFR BLD AUTO: 23 %
M PROTEIN MFR SERPL ELPH: 7.7 G/DL (ref 6.1–8.3)
MCH RBC QN AUTO: 33.8 PG (ref 27–33.2)
MCHC RBC AUTO-ENTMCNC: 35.2 G/DL (ref 31–37)
MCV RBC AUTO: 96 FL (ref 80–99)
MONOCYTES # BLD AUTO: 0.73 X10(3) UL (ref 0.1–1)
MONOCYTES NFR BLD AUTO: 10 %
NEUTROPHIL ABS PRELIM: 4.68 X10 (3) UL (ref 1.3–6.7)
NEUTROPHILS # BLD AUTO: 4.68 X10(3) UL (ref 1.3–6.7)
NEUTROPHILS NFR BLD AUTO: 64.1 %
NONHDLC SERPL-MCNC: 210 MG/DL (ref ?–130)
OSMOLALITY SERPL CALC.SUM OF ELEC: 296 MOSM/KG (ref 275–295)
PHOSPHATE SERPL-MCNC: 3.3 MG/DL (ref 2.5–4.9)
PLATELET # BLD AUTO: 144 10(3)UL (ref 150–450)
POTASSIUM SERPL-SCNC: 4.1 MMOL/L (ref 3.6–5.1)
RBC # BLD AUTO: 5.53 X10(6)UL (ref 3.8–5.8)
RED CELL DISTRIBUTION WIDTH-SD: 46.4 FL (ref 35.1–46.3)
SODIUM SERPL-SCNC: 142 MMOL/L (ref 136–144)
T4 FREE SERPL-MCNC: 0.7 NG/DL (ref 0.9–1.8)
TOTAL IRON BINDING CAPACITY: 383 UG/DL (ref 240–450)
TRANSFERRIN SERPL-MCNC: 257 MG/DL (ref 200–360)
TRIGL SERPL-MCNC: 305 MG/DL (ref 30–149)
TSI SER-ACNC: 2.25 MIU/ML (ref 0.35–5.5)
VIT D+METAB SERPL-MCNC: 31 NG/ML (ref 30–100)
VLDLC SERPL CALC-MCNC: 61 MG/DL (ref 0–30)
WBC # BLD AUTO: 7.3 X10(3) UL (ref 4–13)

## 2018-09-24 PROCEDURE — 99214 OFFICE O/P EST MOD 30 MIN: CPT | Performed by: NURSE PRACTITIONER

## 2018-09-24 PROCEDURE — 85025 COMPLETE CBC W/AUTO DIFF WBC: CPT

## 2018-09-24 PROCEDURE — 84443 ASSAY THYROID STIM HORMONE: CPT

## 2018-09-24 PROCEDURE — 82728 ASSAY OF FERRITIN: CPT

## 2018-09-24 PROCEDURE — 82306 VITAMIN D 25 HYDROXY: CPT

## 2018-09-24 PROCEDURE — 83036 HEMOGLOBIN GLYCOSYLATED A1C: CPT

## 2018-09-24 PROCEDURE — 83550 IRON BINDING TEST: CPT

## 2018-09-24 PROCEDURE — 82607 VITAMIN B-12: CPT

## 2018-09-24 PROCEDURE — 80053 COMPREHEN METABOLIC PANEL: CPT

## 2018-09-24 PROCEDURE — 83540 ASSAY OF IRON: CPT

## 2018-09-24 PROCEDURE — 84425 ASSAY OF VITAMIN B-1: CPT

## 2018-09-24 PROCEDURE — 80061 LIPID PANEL: CPT

## 2018-09-24 PROCEDURE — 84100 ASSAY OF PHOSPHORUS: CPT

## 2018-09-24 PROCEDURE — 83735 ASSAY OF MAGNESIUM: CPT

## 2018-09-24 PROCEDURE — 84439 ASSAY OF FREE THYROXINE: CPT

## 2018-09-24 PROCEDURE — 82746 ASSAY OF FOLIC ACID SERUM: CPT

## 2018-09-24 RX ORDER — PANTOPRAZOLE SODIUM 40 MG/1
40 GRANULE, DELAYED RELEASE ORAL DAILY
Qty: 30 EACH | Refills: 3 | Status: SHIPPED | OUTPATIENT
Start: 2018-09-24 | End: 2018-10-24

## 2018-09-24 NOTE — PATIENT INSTRUCTIONS
PLAN:  Continue with medications: ask surgeon about if you need to continue with PPI (pantrazole)- will give some refills   Go to the lab for blood work   Follow up with me in 3 month  Frozen meals--> lean cuisine steamables, smart ones (low carb), and eat mindfulness, meditation, clarity, sleep, and miesha to your daily life.

## 2018-09-28 LAB — VITAMIN B1 (THIAMINE), WHOLE B: 213 NMOL/L

## 2018-10-01 NOTE — PROGRESS NOTES
a1c stable  tsh stable  Liver enzymes elevated (but are much better from past labs, could consider liver ultrasound which your primary PCP could eval)  Cholesterol (still with elevated triglycerides--> would recommend daily over the counter fish oil   Bord

## 2018-10-02 ENCOUNTER — TELEPHONE (OUTPATIENT)
Dept: FAMILY MEDICINE CLINIC | Facility: CLINIC | Age: 73
End: 2018-10-02

## 2018-10-02 DIAGNOSIS — G47.30 SLEEP APNEA, UNSPECIFIED TYPE: ICD-10-CM

## 2018-10-02 DIAGNOSIS — E78.00 PURE HYPERCHOLESTEROLEMIA: ICD-10-CM

## 2018-10-02 DIAGNOSIS — E66.01 MORBID OBESITY (HCC): Primary | ICD-10-CM

## 2018-10-02 DIAGNOSIS — I10 PRIMARY HYPERTENSION: ICD-10-CM

## 2018-10-02 NOTE — TELEPHONE ENCOUNTER
Referral request Dr. Ariel Mosquera M.D.,Bariatrics    Patient seen by Dr. Shavon Whalen M.D. 7/13/18  Office notes from Dr. Miladys Marrero M.D. 7/13/18  Bariatric surgery - s/p gastric sleeve 6/11/2018 with Daniela. Weight already trending down.       Referral

## 2018-10-12 ENCOUNTER — TELEPHONE (OUTPATIENT)
Dept: FAMILY MEDICINE CLINIC | Facility: CLINIC | Age: 73
End: 2018-10-12

## 2018-10-12 NOTE — TELEPHONE ENCOUNTER
Pt called, states he need to see Pollo Joyner as soon as possible.  States he has a sore throat and ear pain, and that the last time he had these symptoms he has to go to the hospital.

## 2018-10-17 ENCOUNTER — TELEPHONE (OUTPATIENT)
Dept: INTERNAL MEDICINE CLINIC | Facility: CLINIC | Age: 73
End: 2018-10-17

## 2018-10-22 ENCOUNTER — OFFICE VISIT (OUTPATIENT)
Dept: INTERNAL MEDICINE CLINIC | Facility: CLINIC | Age: 73
End: 2018-10-22
Payer: MEDICARE

## 2018-10-22 ENCOUNTER — OFFICE VISIT (OUTPATIENT)
Dept: FAMILY MEDICINE CLINIC | Facility: CLINIC | Age: 73
End: 2018-10-22
Payer: MEDICARE

## 2018-10-22 VITALS
HEIGHT: 69.5 IN | DIASTOLIC BLOOD PRESSURE: 88 MMHG | BODY MASS INDEX: 37.64 KG/M2 | WEIGHT: 260 LBS | HEART RATE: 76 BPM | SYSTOLIC BLOOD PRESSURE: 128 MMHG | RESPIRATION RATE: 16 BRPM

## 2018-10-22 VITALS
HEIGHT: 70 IN | SYSTOLIC BLOOD PRESSURE: 140 MMHG | BODY MASS INDEX: 37.22 KG/M2 | DIASTOLIC BLOOD PRESSURE: 78 MMHG | TEMPERATURE: 99 F | WEIGHT: 260 LBS | RESPIRATION RATE: 16 BRPM | HEART RATE: 70 BPM

## 2018-10-22 DIAGNOSIS — R73.03 PREDIABETES: ICD-10-CM

## 2018-10-22 DIAGNOSIS — E66.1 CLASS 2 DRUG-INDUCED OBESITY WITHOUT SERIOUS COMORBIDITY WITH BODY MASS INDEX (BMI) OF 37.0 TO 37.9 IN ADULT: ICD-10-CM

## 2018-10-22 DIAGNOSIS — G47.00 INSOMNIA, UNSPECIFIED TYPE: ICD-10-CM

## 2018-10-22 DIAGNOSIS — I10 ESSENTIAL HYPERTENSION: ICD-10-CM

## 2018-10-22 DIAGNOSIS — J40 BRONCHITIS: Primary | ICD-10-CM

## 2018-10-22 DIAGNOSIS — Z98.84 STATUS POST LAPAROSCOPIC SLEEVE GASTRECTOMY: ICD-10-CM

## 2018-10-22 DIAGNOSIS — F43.10 PTSD (POST-TRAUMATIC STRESS DISORDER): ICD-10-CM

## 2018-10-22 DIAGNOSIS — H93.8X3 EAR PRESSURE, BILATERAL: ICD-10-CM

## 2018-10-22 DIAGNOSIS — Z51.81 THERAPEUTIC DRUG MONITORING: Primary | ICD-10-CM

## 2018-10-22 DIAGNOSIS — K76.0 FATTY LIVER: ICD-10-CM

## 2018-10-22 DIAGNOSIS — R53.82 CHRONIC FATIGUE SYNDROME: ICD-10-CM

## 2018-10-22 DIAGNOSIS — R05.9 COUGH: ICD-10-CM

## 2018-10-22 PROCEDURE — 99214 OFFICE O/P EST MOD 30 MIN: CPT | Performed by: NURSE PRACTITIONER

## 2018-10-22 PROCEDURE — 99213 OFFICE O/P EST LOW 20 MIN: CPT | Performed by: FAMILY MEDICINE

## 2018-10-22 RX ORDER — ALBUTEROL SULFATE 90 UG/1
2 AEROSOL, METERED RESPIRATORY (INHALATION) EVERY 4 HOURS PRN
Qty: 1 INHALER | Refills: 2 | Status: SHIPPED | OUTPATIENT
Start: 2018-10-22 | End: 2019-03-12 | Stop reason: ALTCHOICE

## 2018-10-22 NOTE — PROGRESS NOTES
HISTORY OF PRESENT ILLNESS  Patient presents with:  Weight Check: 3 pound weight gain    Sheela Branham is a 74year old male here for follow up with medical weight loss program for the treatment of overweight, obesity, or morbid obesity.  Patient has g exertion, denies palpitations or pedal edema  GI: denies abdominal pain at times, no distention, No N/V/D/C  : denies dysuria, flank pain and frequency  MUSCULOSKELETAL: denies back pain, joint pains  NEURO: denies headaches  PSYCH: denies change in beha 09/24/2018    TRIG 305 (H) 09/24/2018    HDL 36 (L) 09/24/2018     (H) 09/24/2018    VLDL 61 (H) 09/24/2018    TCHDLRATIO 8.84 (H) 02/16/2018    NONHDLC 210 (H) 09/24/2018     Lab Results   Component Value Date    T4F 0.7 (L) 09/24/2018    TSH 2.250 stress disorder)    (G47.00) Insomnia, unspecified type    Initial Weight Data and Goal Weight Loss:  Weight Calculations  Initial Weight: 253 lbs  Initial Weight Date: 06/29/18  Today's Weight: 260 lbs  5% Goal: 12.65  10% Goal: 25.3  Total Weight Loss: -

## 2018-10-22 NOTE — PROGRESS NOTES
Patient presents with:  Chest Congestion  Ear Problem: wakes up with ear clogged  Cough: worse at night     HPI:   Chela Steven is a 74year old male who presents to the office for eval of chest and ear congestion. Feels popping in ears.   At times Deacon Hayes M.D.   EMG 3  10/22/18

## 2018-10-22 NOTE — PATIENT INSTRUCTIONS
PLAN:  Surgery- was done in June-> have gained #5 lbs (graph printed)  Follow up with me: will let you know after we talk  Ask Dr. Jim Mcgregor about possible liver ultrasound- elevated liver enzymes, which have improved, as well abnormal cbc (which again is not minute workout\" to help with exercise/activity which takes 7 minutes of your day and that you can do at home! 3. \"Calm\" or \"Headspace\" which helps with mindfulness, meditation, clarity, sleep, and miesha to your daily life.

## 2018-10-22 NOTE — PATIENT INSTRUCTIONS
2 breathing meds:  Pulmicort - given today in office. Use once a day. It has the brown base, you twist ot get a click, then inhale. IT helps to rinse mouth out after use. Albuterol - \"rescue inhaler\". This on is at the pharmacy.   Use 4 times a day

## 2018-10-24 ENCOUNTER — OFFICE VISIT (OUTPATIENT)
Dept: INTERNAL MEDICINE CLINIC | Facility: CLINIC | Age: 73
End: 2018-10-24
Payer: MEDICARE

## 2018-10-24 DIAGNOSIS — E66.9 OBESITY, CLASS II, BMI 35-39.9: Primary | ICD-10-CM

## 2018-10-24 DIAGNOSIS — Z98.84 S/P LAPAROSCOPIC SLEEVE GASTRECTOMY: ICD-10-CM

## 2018-10-24 PROCEDURE — G0447 BEHAVIOR COUNSEL OBESITY 15M: HCPCS | Performed by: DIETITIAN, REGISTERED

## 2018-10-24 RX ORDER — PANTOPRAZOLE SODIUM 40 MG/1
40 TABLET, DELAYED RELEASE ORAL
Qty: 30 TABLET | Refills: 3 | Status: SHIPPED | OUTPATIENT
Start: 2018-10-24 | End: 2019-03-12 | Stop reason: ALTCHOICE

## 2018-10-24 NOTE — TELEPHONE ENCOUNTER
Patient called states pharmacy did not receive rx   Pantoprazole Sodium 40 MG Oral Powd Pack   Pt states the rx was changed           Name of Medication:     Dose:     How is medication prescribed:    Specific name of pharmacy and [address] [place] [address]

## 2018-10-25 VITALS — WEIGHT: 259 LBS | BODY MASS INDEX: 37 KG/M2

## 2018-10-25 NOTE — PROGRESS NOTES
INITIAL OUTPATIENT NUTRITION CONSULTATION    Nutrition Assessment    Medical Diagnosis: Obesity    Physical Findings: fatigue    Client Age and Gender: 74year old male    Marital Status and Occupation: , retired.   Previously had multiple Value Ref Range Status   09/24/2018 305 (H) 30 - 149 mg/dL Final     Comment:       Reference interval for fasting triglycerides  Desirable: <150 mg/dL  Borderline: 150-199 mg/dL  High: 200-499 mg/dL  Very High: >=500 mg/dL           LDL Cholesterol   Date loss: 800-1,000 cals/d for 3 or more pounds/week weight loss    Physical Activity: 0 hrs/week     Food Journal: written    Spent 45 minutes in consultation with the patient and wife Leann Fajardo.       Nutrition Intervention/Education:  Comprehensive nutrition edu

## 2018-10-30 ENCOUNTER — OFFICE VISIT (OUTPATIENT)
Dept: SURGERY | Facility: CLINIC | Age: 73
End: 2018-10-30
Payer: MEDICARE

## 2018-10-30 VITALS
HEIGHT: 68 IN | WEIGHT: 259.38 LBS | HEART RATE: 79 BPM | BODY MASS INDEX: 39.31 KG/M2 | RESPIRATION RATE: 16 BRPM | SYSTOLIC BLOOD PRESSURE: 139 MMHG | DIASTOLIC BLOOD PRESSURE: 86 MMHG

## 2018-10-30 NOTE — PROGRESS NOTES
[address], [address] [address]  Dept: [phone]    10/30/2018    BARIATRIC EXISTING PATIENT/FOLLOW UP    HPI:  Camacho Solomon is a 74year old-year old male wh though interestingly the patient is in his later life. I need to get a handle on his daily activities sleep patterns and diet.   I do suspect he would benefit from therapy I do not know if this would come in the form of a psychologist or psychiatrist.  I d

## 2018-11-26 ENCOUNTER — TELEPHONE (OUTPATIENT)
Dept: INTERNAL MEDICINE CLINIC | Facility: CLINIC | Age: 73
End: 2018-11-26

## 2018-12-03 ENCOUNTER — HOSPITAL ENCOUNTER (OUTPATIENT)
Dept: CT IMAGING | Facility: HOSPITAL | Age: 73
Discharge: HOME | End: 2018-12-03
Attending: OTOLARYNGOLOGY
Payer: MEDICARE

## 2018-12-03 DIAGNOSIS — J32.0 CHRONIC MAXILLARY SINUSITIS: ICD-10-CM

## 2018-12-03 PROCEDURE — 70486 CT MAXILLOFACIAL W/O DYE: CPT | Performed by: OTOLARYNGOLOGY

## 2018-12-14 ENCOUNTER — OFFICE VISIT (OUTPATIENT)
Dept: FAMILY MEDICINE CLINIC | Facility: CLINIC | Age: 73
End: 2018-12-14
Payer: MEDICARE

## 2018-12-14 ENCOUNTER — HOSPITAL ENCOUNTER (OUTPATIENT)
Dept: GENERAL RADIOLOGY | Age: 73
Discharge: HOME | End: 2018-12-14
Attending: NURSE PRACTITIONER
Payer: MEDICARE

## 2018-12-14 VITALS
HEIGHT: 70 IN | RESPIRATION RATE: 16 BRPM | BODY MASS INDEX: 38.22 KG/M2 | TEMPERATURE: 98 F | DIASTOLIC BLOOD PRESSURE: 84 MMHG | WEIGHT: 267 LBS | HEART RATE: 76 BPM | SYSTOLIC BLOOD PRESSURE: 138 MMHG
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
## 2018-12-14 DIAGNOSIS — R07.81 RIB PAIN ON LEFT SIDE: Primary | ICD-10-CM

## 2018-12-14 DIAGNOSIS — R07.81 RIB PAIN ON LEFT SIDE: ICD-10-CM

## 2018-12-14 PROCEDURE — 71101 X-RAY EXAM UNILAT RIBS/CHEST: CPT | Performed by: NURSE PRACTITIONER

## 2018-12-14 PROCEDURE — 99213 OFFICE O/P EST LOW 20 MIN: CPT | Performed by: NURSE PRACTITIONER

## 2018-12-14 RX ORDER — ACETAMINOPHEN AND CODEINE PHOSPHATE 300; 30 MG/1; MG/1
1 TABLET ORAL NIGHTLY PRN
Qty: 20 TABLET | Refills: 0 | Status: SHIPPED | OUTPATIENT
Start: 2018-12-14 | End: 2019-05-14 | Stop reason: ALTCHOICE

## 2018-12-14 NOTE — PROGRESS NOTES
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 Informed patient there is not treatment for fractured ribs and I do not suspect pneumothorax as there is good air movement heard. T#3 HS PRN for pain control.  Warned pt

## 2018-12-21 ENCOUNTER — TELEPHONE (OUTPATIENT)
Dept: SURGERY | Facility: CLINIC | Age: 73
End: 2018-12-21

## 2018-12-28 ENCOUNTER — TELEPHONE (OUTPATIENT)
Dept: FAMILY MEDICINE CLINIC | Facility: CLINIC | Age: 73
End: 2018-12-28

## 2018-12-28 ENCOUNTER — APPOINTMENT (OUTPATIENT)
Dept: CT IMAGING | Facility: HOSPITAL | Age: 73
End: 2018-12-28
Attending: EMERGENCY MEDICINE
Payer: MEDICARE

## 2018-12-28 ENCOUNTER — HOSPITAL ENCOUNTER (EMERGENCY)
Facility: HOSPITAL | Age: 73
Discharge: HOME | End: 2018-12-28
Attending: EMERGENCY MEDICINE
Payer: MEDICARE

## 2018-12-28 VITALS
DIASTOLIC BLOOD PRESSURE: 88 MMHG | SYSTOLIC BLOOD PRESSURE: 138 MMHG | OXYGEN SATURATION: 95 % | WEIGHT: 260 LBS | RESPIRATION RATE: 18 BRPM | HEART RATE: 80 BPM | TEMPERATURE: 97 F | BODY MASS INDEX: 37.22 KG/M2 | HEIGHT: 70 IN

## 2018-12-28 DIAGNOSIS — N30.00 ACUTE CYSTITIS WITHOUT HEMATURIA: ICD-10-CM

## 2018-12-28 DIAGNOSIS — S30.1XXA CONTUSION OF FLANK, INITIAL ENCOUNTER: Primary | ICD-10-CM

## 2018-12-28 LAB
ALBUMIN SERPL-MCNC: 3.9 G/DL (ref 3.1–4.5)
ALBUMIN/GLOB SERPL: 1 {RATIO} (ref 1–2)
ALP LIVER SERPL-CCNC: 82 U/L (ref 45–117)
ALT SERPL-CCNC: 189 U/L (ref 17–63)
ANION GAP SERPL CALC-SCNC: 8 MMOL/L (ref 0–18)
AST SERPL-CCNC: 126 U/L (ref 15–41)
BASOPHILS # BLD AUTO: 0.06 X10(3) UL (ref 0–0.1)
BASOPHILS NFR BLD AUTO: 0.6 %
BILIRUB SERPL-MCNC: 0.9 MG/DL (ref 0.1–2)
BILIRUB UR QL STRIP.AUTO: NEGATIVE
BUN BLD-MCNC: 17 MG/DL (ref 8–20)
BUN/CREAT SERPL: 15.7 (ref 10–20)
CALCIUM BLD-MCNC: 9.5 MG/DL (ref 8.3–10.3)
CHLORIDE SERPL-SCNC: 111 MMOL/L (ref 101–111)
CLARITY UR REFRACT.AUTO: CLEAR
CO2 SERPL-SCNC: 23 MMOL/L (ref 22–32)
CREAT BLD-MCNC: 1.08 MG/DL (ref 0.7–1.3)
EOSINOPHIL # BLD AUTO: 0.09 X10(3) UL (ref 0–0.3)
EOSINOPHIL NFR BLD AUTO: 1 %
ERYTHROCYTE [DISTWIDTH] IN BLOOD BY AUTOMATED COUNT: 12.9 % (ref 11.5–16)
GLOBULIN PLAS-MCNC: 3.9 G/DL (ref 2.8–4.4)
GLUCOSE BLD-MCNC: 116 MG/DL (ref 70–99)
GLUCOSE UR STRIP.AUTO-MCNC: NEGATIVE MG/DL
HCT VFR BLD AUTO: 49.7 % (ref 37–53)
HGB BLD-MCNC: 18.5 G/DL (ref 13–17)
HYALINE CASTS #/AREA URNS AUTO: PRESENT /LPF
IMMATURE GRANULOCYTE COUNT: 0.04 X10(3) UL (ref 0–1)
IMMATURE GRANULOCYTE RATIO %: 0.4 %
KETONES UR STRIP.AUTO-MCNC: NEGATIVE MG/DL
LIPASE: 116 U/L (ref 73–393)
LYMPHOCYTES # BLD AUTO: 1.75 X10(3) UL (ref 0.9–4)
LYMPHOCYTES NFR BLD AUTO: 18.6 %
M PROTEIN MFR SERPL ELPH: 7.8 G/DL (ref 6.4–8.2)
MCH RBC QN AUTO: 35.7 PG (ref 27–33.2)
MCHC RBC AUTO-ENTMCNC: 37.2 G/DL (ref 31–37)
MCV RBC AUTO: 95.9 FL (ref 80–99)
MONOCYTES # BLD AUTO: 0.92 X10(3) UL (ref 0.1–1)
MONOCYTES NFR BLD AUTO: 9.8 %
NEUTROPHIL ABS PRELIM: 6.54 X10 (3) UL (ref 1.3–6.7)
NEUTROPHILS # BLD AUTO: 6.54 X10(3) UL (ref 1.3–6.7)
NEUTROPHILS NFR BLD AUTO: 69.6 %
NITRITE UR QL STRIP.AUTO: NEGATIVE
OSMOLALITY SERPL CALC.SUM OF ELEC: 297 MOSM/KG (ref 275–295)
PH UR STRIP.AUTO: 5 [PH] (ref 4.5–8)
PLATELET # BLD AUTO: 147 10(3)UL (ref 150–450)
POTASSIUM SERPL-SCNC: 3.9 MMOL/L (ref 3.6–5.1)
PROT UR STRIP.AUTO-MCNC: 30 MG/DL
RBC # BLD AUTO: 5.18 X10(6)UL (ref 3.8–5.8)
RBC UR QL AUTO: NEGATIVE
RED CELL DISTRIBUTION WIDTH-SD: 45.6 FL (ref 35.1–46.3)
SODIUM SERPL-SCNC: 142 MMOL/L (ref 136–144)
SP GR UR STRIP.AUTO: 1.03 (ref 1–1.03)
UROBILINOGEN UR STRIP.AUTO-MCNC: <2 MG/DL
WBC # BLD AUTO: 9.4 X10(3) UL (ref 4–13)

## 2018-12-28 PROCEDURE — 80053 COMPREHEN METABOLIC PANEL: CPT | Performed by: EMERGENCY MEDICINE

## 2018-12-28 PROCEDURE — 83690 ASSAY OF LIPASE: CPT | Performed by: EMERGENCY MEDICINE

## 2018-12-28 PROCEDURE — 96361 HYDRATE IV INFUSION ADD-ON: CPT

## 2018-12-28 PROCEDURE — 81001 URINALYSIS AUTO W/SCOPE: CPT | Performed by: EMERGENCY MEDICINE

## 2018-12-28 PROCEDURE — 87086 URINE CULTURE/COLONY COUNT: CPT | Performed by: EMERGENCY MEDICINE

## 2018-12-28 PROCEDURE — 74177 CT ABD & PELVIS W/CONTRAST: CPT | Performed by: EMERGENCY MEDICINE

## 2018-12-28 PROCEDURE — 96360 HYDRATION IV INFUSION INIT: CPT

## 2018-12-28 PROCEDURE — 99285 EMERGENCY DEPT VISIT HI MDM: CPT

## 2018-12-28 PROCEDURE — 99284 EMERGENCY DEPT VISIT MOD MDM: CPT

## 2018-12-28 PROCEDURE — 85025 COMPLETE CBC W/AUTO DIFF WBC: CPT | Performed by: EMERGENCY MEDICINE

## 2018-12-28 RX ORDER — CIPROFLOXACIN 500 MG/1
500 TABLET, FILM COATED ORAL 2 TIMES DAILY
Qty: 20 TABLET | Refills: 0 | Status: SHIPPED | OUTPATIENT
Start: 2018-12-28 | End: 2019-01-07

## 2018-12-28 RX ORDER — HYDROCODONE BITARTRATE AND ACETAMINOPHEN 5; 325 MG/1; MG/1
1 TABLET ORAL EVERY 4 HOURS PRN
Qty: 10 TABLET | Refills: 0 | Status: SHIPPED | OUTPATIENT
Start: 2018-12-28 | End: 2019-01-04

## 2018-12-28 RX ORDER — SODIUM CHLORIDE 9 MG/ML
125 INJECTION, SOLUTION INTRAVENOUS CONTINUOUS
Status: DISCONTINUED | OUTPATIENT
Start: 2018-12-28 | End: 2018-12-28

## 2018-12-28 NOTE — TELEPHONE ENCOUNTER
Pt c/o severe left abdominal and rib pain that has gotten worse over past 2 weeks. Level 10 pain. Pt is febrile, nauseated, diaphoretic,SOB and fatigued. LOV 12/14/18 was for left rib pain. Advised that pt go to ER.  He agreed with the plan and will g

## 2018-12-28 NOTE — ED INITIAL ASSESSMENT (HPI)
Pt states he fell out of a broken chair and onto a piano bench onto his left side 2 weeks ago. Pt has been having pain with movement and in certain positions, increasing over the past 2 weeks.  Pt states he had xrays done 2 weeks ago but xrays were negativ

## 2018-12-29 NOTE — ED PROVIDER NOTES
Patient Seen in: [facility] Emergency Department    History   Patient presents with:  Fall (musculoskeletal, neurologic)  Abdomen/Flank Pain (GI/)    Stated Complaint: Lt sided rib/abd pain after a fall from a chair 2 weeks ago.  No anticoag    HPI • NEEDLE BIOPSY LIVER     • REPAIR ROTATOR CUFF,ACUTE Right    • TONSILLECTOMY     • WRIST FRACTURE SURGERY Right            Social History    Tobacco Use      Smoking status: Never Smoker      Smokeless tobacco: Never Used    Alcohol use: No    Drug use PANEL (14) - Abnormal; Notable for the following components:    Glucose 116 (*)     Calculated Osmolality 297 (*)      (*)     Alt 189 (*)     All other components within normal limits   CBC W/ DIFFERENTIAL - Abnormal; Notable for the following comp 12/28/2018  6:17 PM    START taking these medications    Ciprofloxacin HCl 500 MG Oral Tab  Take 1 tablet (500 mg total) by mouth 2 (two) times daily for 10 days. , Print Script, Disp-20 tablet, R-0    HYDROcodone-acetaminophen 5-325 MG Oral Tab  Take 1 tab

## 2019-02-28 VITALS
OXYGEN SATURATION: 91 % | HEART RATE: 84 BPM | WEIGHT: 280 LBS | SYSTOLIC BLOOD PRESSURE: 158 MMHG | BODY MASS INDEX: 41.47 KG/M2 | DIASTOLIC BLOOD PRESSURE: 84 MMHG | HEIGHT: 69 IN

## 2019-02-28 VITALS — SYSTOLIC BLOOD PRESSURE: 118 MMHG | DIASTOLIC BLOOD PRESSURE: 72 MMHG

## 2019-03-05 ENCOUNTER — TELEPHONE (OUTPATIENT)
Dept: FAMILY MEDICINE CLINIC | Facility: CLINIC | Age: 74
End: 2019-03-05

## 2019-03-12 ENCOUNTER — OFFICE VISIT (OUTPATIENT)
Dept: FAMILY MEDICINE CLINIC | Facility: CLINIC | Age: 74
End: 2019-03-12
Payer: MEDICARE

## 2019-03-12 VITALS
HEIGHT: 70 IN | DIASTOLIC BLOOD PRESSURE: 84 MMHG | TEMPERATURE: 98 F | SYSTOLIC BLOOD PRESSURE: 138 MMHG | HEART RATE: 80 BPM | WEIGHT: 268 LBS | BODY MASS INDEX: 38.37 KG/M2 | RESPIRATION RATE: 16 BRPM

## 2019-03-12 DIAGNOSIS — K76.0 FATTY LIVER: ICD-10-CM

## 2019-03-12 DIAGNOSIS — R91.8 MULTIPLE LUNG NODULES ON CT: ICD-10-CM

## 2019-03-12 DIAGNOSIS — F43.10 PTSD (POST-TRAUMATIC STRESS DISORDER): ICD-10-CM

## 2019-03-12 DIAGNOSIS — K58.2 IRRITABLE BOWEL SYNDROME WITH BOTH CONSTIPATION AND DIARRHEA: ICD-10-CM

## 2019-03-12 DIAGNOSIS — I10 ESSENTIAL HYPERTENSION: ICD-10-CM

## 2019-03-12 DIAGNOSIS — I70.0 THORACIC AORTA ATHEROSCLEROSIS (HCC): ICD-10-CM

## 2019-03-12 DIAGNOSIS — Z98.1 S/P LUMBAR FUSION: ICD-10-CM

## 2019-03-12 DIAGNOSIS — Z98.84 S/P LAPAROSCOPIC SLEEVE GASTRECTOMY: ICD-10-CM

## 2019-03-12 DIAGNOSIS — R73.03 PREDIABETES: ICD-10-CM

## 2019-03-12 DIAGNOSIS — R53.82 CHRONIC FATIGUE SYNDROME: ICD-10-CM

## 2019-03-12 DIAGNOSIS — M06.9 RHEUMATOID ARTHRITIS, INVOLVING UNSPECIFIED SITE, UNSPECIFIED RHEUMATOID FACTOR PRESENCE: ICD-10-CM

## 2019-03-12 DIAGNOSIS — E78.2 HYPERCHOLESTEROLEMIA WITH HYPERTRIGLYCERIDEMIA: ICD-10-CM

## 2019-03-12 DIAGNOSIS — I77.811 ECTATIC ABDOMINAL AORTA (HCC): ICD-10-CM

## 2019-03-12 DIAGNOSIS — F32.5 MAJOR DEPRESSION IN REMISSION (HCC): ICD-10-CM

## 2019-03-12 DIAGNOSIS — D69.6 THROMBOCYTOPENIA (HCC): ICD-10-CM

## 2019-03-12 DIAGNOSIS — Z00.00 ENCOUNTER FOR ANNUAL HEALTH EXAMINATION: Primary | ICD-10-CM

## 2019-03-12 DIAGNOSIS — M48.061 LUMBAR FORAMINAL STENOSIS: ICD-10-CM

## 2019-03-12 DIAGNOSIS — I27.20 PULMONARY HYPERTENSION (HCC): ICD-10-CM

## 2019-03-12 DIAGNOSIS — G47.01 INSOMNIA DUE TO MEDICAL CONDITION: ICD-10-CM

## 2019-03-12 DIAGNOSIS — R61 EXCESSIVE SWEATING: ICD-10-CM

## 2019-03-12 DIAGNOSIS — E66.01 SEVERE OBESITY (BMI 35.0-39.9) WITH COMORBIDITY (HCC): ICD-10-CM

## 2019-03-12 PROBLEM — Z51.81 THERAPEUTIC DRUG MONITORING: Status: RESOLVED | Noted: 2018-10-22 | Resolved: 2019-03-12

## 2019-03-12 PROCEDURE — 96160 PT-FOCUSED HLTH RISK ASSMT: CPT | Performed by: FAMILY MEDICINE

## 2019-03-12 PROCEDURE — 99397 PER PM REEVAL EST PAT 65+ YR: CPT | Performed by: FAMILY MEDICINE

## 2019-03-12 PROCEDURE — G0439 PPPS, SUBSEQ VISIT: HCPCS | Performed by: FAMILY MEDICINE

## 2019-03-12 RX ORDER — PRAZOSIN HYDROCHLORIDE 1 MG/1
1 CAPSULE ORAL NIGHTLY
Qty: 30 CAPSULE | Refills: 2 | Status: SHIPPED | OUTPATIENT
Start: 2019-03-12 | End: 2019-07-29

## 2019-03-12 RX ORDER — METFORMIN HYDROCHLORIDE 500 MG/1
500 TABLET, EXTENDED RELEASE ORAL
Qty: 30 TABLET | Refills: 2 | Status: SHIPPED | OUTPATIENT
Start: 2019-03-12 | End: 2019-07-29

## 2019-03-12 RX ORDER — TOPIRAMATE 50 MG/1
50 TABLET, FILM COATED ORAL 2 TIMES DAILY
Qty: 60 TABLET | Refills: 2 | Status: SHIPPED | OUTPATIENT
Start: 2019-03-12 | End: 2019-07-29

## 2019-03-12 RX ORDER — ACETAMINOPHEN AND CODEINE PHOSPHATE 120; 12 MG/5ML; MG/5ML
SOLUTION ORAL
Qty: 30 CAPSULE | Refills: 5 | Status: SHIPPED | OUTPATIENT
Start: 2019-03-12 | End: 2019-05-14

## 2019-03-12 RX ORDER — ALPRAZOLAM 1 MG/1
TABLET ORAL
Qty: 60 TABLET | Refills: 5 | Status: SHIPPED | OUTPATIENT
Start: 2019-03-12 | End: 2019-10-23

## 2019-03-12 NOTE — PATIENT INSTRUCTIONS
Adelaida Meza's SCREENING SCHEDULE   Tests on this list are recommended by your physician but may not be covered, or covered at this frequency, by your insurer. Please check with your insurance carrier before scheduling to verify coverage.     PREVEN Covered every 10 years- more often if abnormal Colonoscopy due on 07/09/1945 Update Health Maintenance if applicable    Flex Sigmoidoscopy Screen  Covered every 5 years No results found for this or any previous visit. No flowsheet data found.      Fecal O does not cover unless Medically needed    Zoster (Not covered by Medicare Part B) No orders found for this or any previous visit. This may be covered with your pharmacy  prescription benefits     Recommended Websites for Advanced Directives    http://www. i

## 2019-03-12 NOTE — PROGRESS NOTES
HPI:   Thelma Connell is a 74year old male who presents for a MA (Medicare Advantage) [facility] (Once per calendar year). Preventative Screening  Colonoscopy - age 74, no indication. Prostate - no symptoms. Immunizations - PNA UTD x 2.       O depressed, or hopeless (over the last two weeks)?: More than half the days  PHQ-2 SCORE: 3   PHQ-9 Depression Screen     1. Little interest or pleasure in doing things: Nearly every day  2. Feeling down, depressed, or hopeless: More than half the days  3. as Consulting Physician (Henry Diaz)  Castro Larry MD as Consulting Physician (OPHTHALMOLOGY)  PREETI Prather (Family Practice)  Jeremy Mireles RD (Dietitian)  Sid Noel MD as Consulting Physician (Anthony Rose)  Ciera Roberson MD as Zora Garza MEDICATIONS:     Outpatient Medications Marked as Taking for the 3/12/19 encounter (Office Visit) with Jacob Patel MD:  Prazosin HCl 1 MG Oral Cap Take 1 capsule (1 mg total) by mouth nightly.    metFORMIN HCl  MG Oral Tablet 24 Hr Take 1 tablet sleep  Eyes: negative  Ears, nose, mouth, throat, and face: negative  Respiratory: negative  Cardiovascular: negative  Gastrointestinal: IBS - diarrhea and constipation, sweating and fever after eating  Genitourinary: negative  Neurological: negative  Psyc normal, no rashes or lesions   Neurologic: Normal        Vaccination History     Immunization History   Administered Date(s) Administered   • FLUAD High Dose 65 yr and older (66612) 01/02/2018   • Influenza 12/20/2016   • Pneumococcal (Prevnar 13) 12/20/20 Refill: 5  - Flurazepam HCl 30 MG Oral Cap; TAKE ONE CAPSULE BY MOUTH EVERY NIGHT AT BEDTIME AS NEEDED FOR SLEEP  Dispense: 30 capsule; Refill: 5    7.  Insomnia due to medical condition  Uses at night to help sleep  Continue.  - ALPRAZolam 1 MG Oral Tab; T treatment. - Prazosin HCl 1 MG Oral Cap; Take 1 capsule (1 mg total) by mouth nightly. Dispense: 30 capsule; Refill: 2    21. Multiple lung nodules on CT  Due for repeat  Low risk - non smoker    - CT CHEST(CONTRAST ONLY) (CPT=71260);  Future Screening      PSA  Annually PSA due on 07/09/1993  Update Health Maintenance if applicable     Immunizations (Update Immunization Activity if applicable)     Influenza  Covered Annually 1/2/2018   Please get every year    Pneumococcal 13 (Prevnar)  Covere

## 2019-04-30 ENCOUNTER — TELEPHONE (OUTPATIENT)
Dept: FAMILY MEDICINE CLINIC | Facility: CLINIC | Age: 74
End: 2019-04-30

## 2019-04-30 DIAGNOSIS — E66.01 MORBID OBESITY (HCC): Primary | ICD-10-CM

## 2019-04-30 NOTE — TELEPHONE ENCOUNTER
Referral request Dr. Ariel Mosquera M.D. Patient seen by Dr. Shavon Whalen M.D. 3/12/19  Office notes from Dr. Shavon Whalen M.D. 3/12/19     Overall he does not feel well at all. Since a few months ago, he has only left the house a few times.   Fe

## 2019-05-02 ENCOUNTER — TELEPHONE (OUTPATIENT)
Dept: FAMILY MEDICINE CLINIC | Facility: CLINIC | Age: 74
End: 2019-05-02

## 2019-05-02 NOTE — TELEPHONE ENCOUNTER
Pt states that he has been coughing -yellow phlegm for several weeks, sneezing, sore throat, left ear pain, insomnia and excessive perspiration.     Future Appointments           Future Appointments   Date Time Provider Doroteo Hernandez   5/3/2019 11:00 AM

## 2019-05-02 NOTE — TELEPHONE ENCOUNTER
Patient called to schedule an appt with Dr. Fredo Silva, he has a sore throat, coughing, ear pressure and is very lethargic it has been going on for 3 weeks now.   He scheduled for 5/6/19 at 2:15 pm I offered him tomorrow at 11 am or to see another physician in

## 2019-05-14 ENCOUNTER — OFFICE VISIT (OUTPATIENT)
Dept: FAMILY MEDICINE CLINIC | Facility: CLINIC | Age: 74
End: 2019-05-14
Payer: MEDICARE

## 2019-05-14 VITALS
TEMPERATURE: 98 F | WEIGHT: 265 LBS | DIASTOLIC BLOOD PRESSURE: 84 MMHG | RESPIRATION RATE: 16 BRPM | HEART RATE: 80 BPM | BODY MASS INDEX: 37.94 KG/M2 | SYSTOLIC BLOOD PRESSURE: 138 MMHG | HEIGHT: 70 IN

## 2019-05-14 DIAGNOSIS — E66.01 SEVERE OBESITY (BMI 35.0-39.9) WITH COMORBIDITY (HCC): ICD-10-CM

## 2019-05-14 DIAGNOSIS — R61 HYPERHIDROSIS: ICD-10-CM

## 2019-05-14 DIAGNOSIS — R09.81 SINUS CONGESTION: ICD-10-CM

## 2019-05-14 DIAGNOSIS — G47.00 INSOMNIA, UNSPECIFIED TYPE: Primary | ICD-10-CM

## 2019-05-14 DIAGNOSIS — H92.03 OTALGIA OF BOTH EARS: ICD-10-CM

## 2019-05-14 DIAGNOSIS — F43.10 PTSD (POST-TRAUMATIC STRESS DISORDER): ICD-10-CM

## 2019-05-14 PROCEDURE — 99214 OFFICE O/P EST MOD 30 MIN: CPT | Performed by: FAMILY MEDICINE

## 2019-05-14 RX ORDER — DIAZEPAM 5 MG/1
5 TABLET ORAL EVERY 6 HOURS PRN
Qty: 30 TABLET | Refills: 5 | Status: SHIPPED | OUTPATIENT
Start: 2019-05-14 | End: 2019-05-14

## 2019-05-14 RX ORDER — AMOXICILLIN AND CLAVULANATE POTASSIUM 875; 125 MG/1; MG/1
1 TABLET, FILM COATED ORAL 2 TIMES DAILY
Qty: 14 TABLET | Refills: 0 | Status: SHIPPED | OUTPATIENT
Start: 2019-05-14 | End: 2019-05-21

## 2019-05-14 RX ORDER — DIAZEPAM 5 MG/1
5 TABLET ORAL NIGHTLY PRN
Qty: 30 TABLET | Refills: 5 | Status: SHIPPED | OUTPATIENT
Start: 2019-05-14 | End: 2019-11-23

## 2019-05-14 NOTE — PROGRESS NOTES
Patient presents with:  Sleep Problem: medication f/u   Cough     HPI:   Emery Boyer is a 74year old male who presents to the office for f/u of several of his ongoing medical conditions.     PTSD and sleep issues - on alprazolam and flurazepam.  Ins pharynx normal without lesions  Neck - supple, no significant adenopathy  Chest - clear to auscultation, no wheezes, rales or rhonchi, symmetric air entry  Heart - normal rate, regular rhythm, normal S1, S2, no murmurs, rubs, clicks or gallops  Abdomen - s effects  Can consider oxybutynin or other meds in future. Rad Burr M.D.   EMG 3  05/14/19    F/u 2-3 months.

## 2019-05-14 NOTE — PATIENT INSTRUCTIONS
1. Sweating - was on prazosin for this. Stopped after three days. I suspect his was the culprit of the side effects. Will continue to hold  2. Weight - will go back on metformin and the Topamax.   Start Topamax for a week, then add on the metformin after

## 2019-06-19 ENCOUNTER — TELEPHONE (OUTPATIENT)
Dept: SURGERY | Facility: CLINIC | Age: 74
End: 2019-06-19

## 2019-07-22 ENCOUNTER — TELEPHONE (OUTPATIENT)
Dept: FAMILY MEDICINE CLINIC | Facility: CLINIC | Age: 74
End: 2019-07-22

## 2019-07-23 NOTE — TELEPHONE ENCOUNTER
Would he be available Monday at 3pm?  We can double book there. Perhaps the last appt this coming Thursday?

## 2019-07-29 ENCOUNTER — OFFICE VISIT (OUTPATIENT)
Dept: FAMILY MEDICINE CLINIC | Facility: CLINIC | Age: 74
End: 2019-07-29
Payer: MEDICARE

## 2019-07-29 VITALS
RESPIRATION RATE: 16 BRPM | WEIGHT: 266 LBS | HEIGHT: 69.5 IN | TEMPERATURE: 98 F | SYSTOLIC BLOOD PRESSURE: 136 MMHG | BODY MASS INDEX: 38.51 KG/M2 | HEART RATE: 86 BPM | DIASTOLIC BLOOD PRESSURE: 86 MMHG

## 2019-07-29 DIAGNOSIS — R61 HYPERHIDROSIS: Primary | ICD-10-CM

## 2019-07-29 DIAGNOSIS — G47.00 INSOMNIA, UNSPECIFIED TYPE: ICD-10-CM

## 2019-07-29 DIAGNOSIS — F43.10 PTSD (POST-TRAUMATIC STRESS DISORDER): ICD-10-CM

## 2019-07-29 PROCEDURE — 99214 OFFICE O/P EST MOD 30 MIN: CPT | Performed by: FAMILY MEDICINE

## 2019-07-29 RX ORDER — GLYCOPYRROLATE 1 MG/1
1 TABLET ORAL 2 TIMES DAILY
Qty: 60 TABLET | Refills: 2 | Status: SHIPPED | OUTPATIENT
Start: 2019-07-29 | End: 2019-09-04 | Stop reason: CLARIF

## 2019-07-29 NOTE — PROGRESS NOTES
Patient presents with:  Cold: for a couple months, can't get rid of. Complete Form: for workers comp     HPI:   Josh Hilliard is a 66year old male who presents to the office for URI symptoms and form for work. URI - chronic symptoms.   Congestion movements intact  Ears - TMs clear, but fullness noted.   Nose - mucosal congestion  Mouth - mucous membranes moist, pharynx normal without lesions  Neck - supple, no significant adenopathy  Chest - clear to auscultation, no wheezes, rales or rhonchi, symme

## 2019-07-30 ENCOUNTER — TELEPHONE (OUTPATIENT)
Dept: FAMILY MEDICINE CLINIC | Facility: CLINIC | Age: 74
End: 2019-07-30

## 2019-07-30 DIAGNOSIS — H25.093 AGE-RELATED INCIPIENT CATARACT OF BOTH EYES: Primary | ICD-10-CM

## 2019-08-02 ENCOUNTER — TELEPHONE (OUTPATIENT)
Dept: FAMILY MEDICINE CLINIC | Facility: CLINIC | Age: 74
End: 2019-08-02

## 2019-08-02 NOTE — TELEPHONE ENCOUNTER
Patient requesting to speak to nurse regarding workers comp forms. Patient states he was in on Monday to see Dr. Barry England to have forms completed and forms need to be mailed by today. Checked Dr. Talha Abdullahi office and did not see any forms.  Patient maye

## 2019-08-07 NOTE — TELEPHONE ENCOUNTER
Left reminder voicemail message for patient to please schedule one year follow up appointments with bariatrician, surgeon & dietician per Bariatric Program protocol. Also sent letter to home address on file and filed in chart.

## 2019-08-25 ENCOUNTER — HOSPITAL ENCOUNTER (INPATIENT)
Facility: HOSPITAL | Age: 74
LOS: 5 days | Discharge: HOME | DRG: 247 | End: 2019-08-30
Admitting: HOSPITALIST
Payer: MEDICARE

## 2019-08-25 ENCOUNTER — APPOINTMENT (OUTPATIENT)
Dept: INTERVENTIONAL RADIOLOGY/VASCULAR | Facility: HOSPITAL | Age: 74
DRG: 247 | End: 2019-08-25
Attending: INTERNAL MEDICINE
Payer: MEDICARE

## 2019-08-25 ENCOUNTER — APPOINTMENT (OUTPATIENT)
Dept: GENERAL RADIOLOGY | Facility: HOSPITAL | Age: 74
DRG: 247 | End: 2019-08-25
Payer: MEDICARE

## 2019-08-25 DIAGNOSIS — I21.19 ACUTE ST ELEVATION MYOCARDIAL INFARCTION (STEMI) INVOLVING OTHER CORONARY ARTERY OF INFERIOR WALL (HCC): Primary | ICD-10-CM

## 2019-08-25 PROBLEM — I21.9 ACUTE MYOCARDIAL INFARCTION (HCC): Status: ACTIVE | Noted: 2019-08-25

## 2019-08-25 LAB
ALBUMIN SERPL-MCNC: 3.3 G/DL (ref 3.4–5)
ALBUMIN/GLOB SERPL: 1 {RATIO} (ref 1–2)
ALP LIVER SERPL-CCNC: 79 U/L (ref 45–117)
ALT SERPL-CCNC: 123 U/L (ref 16–61)
ANION GAP SERPL CALC-SCNC: 13 MMOL/L (ref 0–18)
APTT PPP: 24.1 SECONDS (ref 25.4–36.1)
AST SERPL-CCNC: 117 U/L (ref 15–37)
BASOPHILS # BLD AUTO: 0.09 X10(3) UL (ref 0–0.2)
BASOPHILS NFR BLD AUTO: 0.8 %
BILIRUB SERPL-MCNC: 0.7 MG/DL (ref 0.1–2)
BUN BLD-MCNC: 11 MG/DL (ref 7–18)
BUN/CREAT SERPL: 9 (ref 10–20)
CALCIUM BLD-MCNC: 8 MG/DL (ref 8.5–10.1)
CHLORIDE SERPL-SCNC: 105 MMOL/L (ref 98–112)
CHOLEST SMN-MCNC: 189 MG/DL (ref ?–200)
CO2 SERPL-SCNC: 24 MMOL/L (ref 21–32)
CREAT BLD-MCNC: 1.22 MG/DL (ref 0.7–1.3)
DEPRECATED RDW RBC AUTO: 48 FL (ref 35.1–46.3)
EOSINOPHIL # BLD AUTO: 0.16 X10(3) UL (ref 0–0.7)
EOSINOPHIL NFR BLD AUTO: 1.3 %
ERYTHROCYTE [DISTWIDTH] IN BLOOD BY AUTOMATED COUNT: 12.8 % (ref 11–15)
GLOBULIN PLAS-MCNC: 3.3 G/DL (ref 2.8–4.4)
GLUCOSE BLD-MCNC: 184 MG/DL (ref 70–99)
HCT VFR BLD AUTO: 49.8 % (ref 39–53)
HDLC SERPL-MCNC: 25 MG/DL (ref 40–59)
HGB BLD-MCNC: 18.2 G/DL (ref 13–17.5)
IMM GRANULOCYTES # BLD AUTO: 0.05 X10(3) UL (ref 0–1)
IMM GRANULOCYTES NFR BLD: 0.4 %
INR BLD: 1.05 (ref 0.9–1.1)
LDLC SERPL DIRECT ASSAY-MCNC: 110 MG/DL (ref ?–100)
LYMPHOCYTES # BLD AUTO: 4.85 X10(3) UL (ref 1–4)
LYMPHOCYTES NFR BLD AUTO: 40.7 %
M PROTEIN MFR SERPL ELPH: 6.6 G/DL (ref 6.4–8.2)
MCH RBC QN AUTO: 37.3 PG (ref 26–34)
MCHC RBC AUTO-ENTMCNC: 36.5 G/DL (ref 31–37)
MCV RBC AUTO: 102 FL (ref 80–100)
MONOCYTES # BLD AUTO: 1.27 X10(3) UL (ref 0.1–1)
MONOCYTES NFR BLD AUTO: 10.7 %
NEUTROPHILS # BLD AUTO: 5.5 X10 (3) UL (ref 1.5–7.7)
NEUTROPHILS # BLD AUTO: 5.5 X10(3) UL (ref 1.5–7.7)
NEUTROPHILS NFR BLD AUTO: 46.1 %
NONHDLC SERPL-MCNC: 164 MG/DL (ref ?–130)
NT-PROBNP SERPL-MCNC: 83 PG/ML (ref ?–450)
OSMOLALITY SERPL CALC.SUM OF ELEC: 298 MOSM/KG (ref 275–295)
PLATELET # BLD AUTO: 174 10(3)UL (ref 150–450)
POTASSIUM SERPL-SCNC: 3.2 MMOL/L (ref 3.5–5.1)
PSA SERPL DL<=0.01 NG/ML-MCNC: 14.2 SECONDS (ref 12.5–14.7)
RBC # BLD AUTO: 4.88 X10(6)UL (ref 3.8–5.8)
SODIUM SERPL-SCNC: 142 MMOL/L (ref 136–145)
TRIGL SERPL-MCNC: 537 MG/DL (ref 30–149)
TROPONIN I SERPL-MCNC: <0.045 NG/ML (ref ?–0.04)
WBC # BLD AUTO: 11.9 X10(3) UL (ref 4–11)

## 2019-08-25 PROCEDURE — B240ZZ3 ULTRASONOGRAPHY OF SINGLE CORONARY ARTERY, INTRAVASCULAR: ICD-10-PCS | Performed by: INTERNAL MEDICINE

## 2019-08-25 PROCEDURE — 71045 X-RAY EXAM CHEST 1 VIEW: CPT

## 2019-08-25 PROCEDURE — B41FYZZ FLUOROSCOPY OF RIGHT LOWER EXTREMITY ARTERIES USING OTHER CONTRAST: ICD-10-PCS | Performed by: INTERNAL MEDICINE

## 2019-08-25 PROCEDURE — 93458 L HRT ARTERY/VENTRICLE ANGIO: CPT | Performed by: INTERNAL MEDICINE

## 2019-08-25 PROCEDURE — 99223 1ST HOSP IP/OBS HIGH 75: CPT | Performed by: INTERNAL MEDICINE

## 2019-08-25 PROCEDURE — 99152 MOD SED SAME PHYS/QHP 5/>YRS: CPT | Performed by: INTERNAL MEDICINE

## 2019-08-25 PROCEDURE — B211YZZ FLUOROSCOPY OF MULTIPLE CORONARY ARTERIES USING OTHER CONTRAST: ICD-10-PCS | Performed by: INTERNAL MEDICINE

## 2019-08-25 PROCEDURE — 99221 1ST HOSP IP/OBS SF/LOW 40: CPT | Performed by: HOSPITALIST

## 2019-08-25 PROCEDURE — 4A023N7 MEASUREMENT OF CARDIAC SAMPLING AND PRESSURE, LEFT HEART, PERCUTANEOUS APPROACH: ICD-10-PCS | Performed by: INTERNAL MEDICINE

## 2019-08-25 PROCEDURE — B215YZZ FLUOROSCOPY OF LEFT HEART USING OTHER CONTRAST: ICD-10-PCS | Performed by: INTERNAL MEDICINE

## 2019-08-25 PROCEDURE — 92978 ENDOLUMINL IVUS OCT C 1ST: CPT | Performed by: INTERNAL MEDICINE

## 2019-08-25 PROCEDURE — 027034Z DILATION OF CORONARY ARTERY, ONE ARTERY WITH DRUG-ELUTING INTRALUMINAL DEVICE, PERCUTANEOUS APPROACH: ICD-10-PCS | Performed by: INTERNAL MEDICINE

## 2019-08-25 PROCEDURE — 92941 PRQ TRLML REVSC TOT OCCL AMI: CPT | Performed by: INTERNAL MEDICINE

## 2019-08-25 PROCEDURE — 02C03ZZ EXTIRPATION OF MATTER FROM CORONARY ARTERY, ONE ARTERY, PERCUTANEOUS APPROACH: ICD-10-PCS | Performed by: INTERNAL MEDICINE

## 2019-08-25 RX ORDER — SODIUM CHLORIDE 9 MG/ML
INJECTION, SOLUTION INTRAVENOUS CONTINUOUS
Status: DISCONTINUED | OUTPATIENT
Start: 2019-08-25 | End: 2019-08-26

## 2019-08-25 RX ORDER — ACETAMINOPHEN 325 MG/1
650 TABLET ORAL EVERY 6 HOURS PRN
Status: DISCONTINUED | OUTPATIENT
Start: 2019-08-25 | End: 2019-08-30

## 2019-08-25 RX ORDER — MORPHINE SULFATE 4 MG/ML
INJECTION, SOLUTION INTRAMUSCULAR; INTRAVENOUS
Status: COMPLETED
Start: 2019-08-25 | End: 2019-08-25

## 2019-08-25 RX ORDER — ASPIRIN 81 MG/1
324 TABLET, CHEWABLE ORAL DAILY
Status: DISCONTINUED | OUTPATIENT
Start: 2019-08-26 | End: 2019-08-26

## 2019-08-25 RX ORDER — NOREPINEPHRINE BITARTRATE 1 MG/ML
INJECTION, SOLUTION INTRAVENOUS
Status: DISCONTINUED
Start: 2019-08-25 | End: 2019-08-25 | Stop reason: WASHOUT

## 2019-08-25 RX ORDER — HEPARIN SODIUM 5000 [USP'U]/ML
INJECTION, SOLUTION INTRAVENOUS; SUBCUTANEOUS
Status: COMPLETED
Start: 2019-08-25 | End: 2019-08-25

## 2019-08-25 RX ORDER — METOCLOPRAMIDE HYDROCHLORIDE 5 MG/ML
10 INJECTION INTRAMUSCULAR; INTRAVENOUS EVERY 8 HOURS PRN
Status: DISCONTINUED | OUTPATIENT
Start: 2019-08-25 | End: 2019-08-30

## 2019-08-25 RX ORDER — BIVALIRUDIN 250 MG/5ML
INJECTION, POWDER, LYOPHILIZED, FOR SOLUTION INTRAVENOUS
Status: COMPLETED
Start: 2019-08-25 | End: 2019-08-25

## 2019-08-25 RX ORDER — MORPHINE SULFATE 2 MG/ML
INJECTION, SOLUTION INTRAMUSCULAR; INTRAVENOUS
Status: COMPLETED | OUTPATIENT
Start: 2019-08-25 | End: 2019-08-25

## 2019-08-25 RX ORDER — ONDANSETRON 2 MG/ML
4 INJECTION INTRAMUSCULAR; INTRAVENOUS EVERY 6 HOURS PRN
Status: DISCONTINUED | OUTPATIENT
Start: 2019-08-25 | End: 2019-08-30

## 2019-08-25 RX ORDER — ASPIRIN 81 MG/1
81 TABLET ORAL DAILY
Status: DISCONTINUED | OUTPATIENT
Start: 2019-08-26 | End: 2019-08-30

## 2019-08-25 RX ORDER — ONDANSETRON 2 MG/ML
4 INJECTION INTRAMUSCULAR; INTRAVENOUS ONCE
Status: COMPLETED | OUTPATIENT
Start: 2019-08-25 | End: 2019-08-25

## 2019-08-25 RX ORDER — MORPHINE SULFATE 4 MG/ML
2 INJECTION, SOLUTION INTRAMUSCULAR; INTRAVENOUS ONCE
Status: COMPLETED | OUTPATIENT
Start: 2019-08-25 | End: 2019-08-25

## 2019-08-25 RX ORDER — ATORVASTATIN CALCIUM 40 MG/1
40 TABLET, FILM COATED ORAL NIGHTLY
Status: DISCONTINUED | OUTPATIENT
Start: 2019-08-25 | End: 2019-08-25

## 2019-08-25 RX ORDER — ATORVASTATIN CALCIUM 40 MG/1
40 TABLET, FILM COATED ORAL NIGHTLY
Status: DISCONTINUED | OUTPATIENT
Start: 2019-08-25 | End: 2019-08-27

## 2019-08-25 RX ORDER — TRAZODONE HYDROCHLORIDE 50 MG/1
50 TABLET ORAL NIGHTLY PRN
Status: DISCONTINUED | OUTPATIENT
Start: 2019-08-25 | End: 2019-08-30

## 2019-08-25 RX ORDER — LIDOCAINE HYDROCHLORIDE 10 MG/ML
INJECTION, SOLUTION EPIDURAL; INFILTRATION; INTRACAUDAL; PERINEURAL
Status: COMPLETED
Start: 2019-08-25 | End: 2019-08-25

## 2019-08-26 LAB
ANION GAP SERPL CALC-SCNC: 6 MMOL/L (ref 0–18)
ATRIAL RATE: 48 BPM
ATRIAL RATE: 59 BPM
ATRIAL RATE: 66 BPM
BASOPHILS # BLD AUTO: 0.02 X10(3) UL (ref 0–0.2)
BASOPHILS NFR BLD AUTO: 0.2 %
BUN BLD-MCNC: 10 MG/DL (ref 7–18)
BUN/CREAT SERPL: 9.3 (ref 10–20)
CALCIUM BLD-MCNC: 8.5 MG/DL (ref 8.5–10.1)
CHLORIDE SERPL-SCNC: 105 MMOL/L (ref 98–112)
CHOLEST SMN-MCNC: 194 MG/DL (ref ?–200)
CO2 SERPL-SCNC: 29 MMOL/L (ref 21–32)
CREAT BLD-MCNC: 1.07 MG/DL (ref 0.7–1.3)
DEPRECATED RDW RBC AUTO: 48.1 FL (ref 35.1–46.3)
EOSINOPHIL # BLD AUTO: 0.01 X10(3) UL (ref 0–0.7)
EOSINOPHIL NFR BLD AUTO: 0.1 %
ERYTHROCYTE [DISTWIDTH] IN BLOOD BY AUTOMATED COUNT: 12.8 % (ref 11–15)
EST. AVERAGE GLUCOSE BLD GHB EST-MCNC: 108 MG/DL (ref 68–126)
GLUCOSE BLD-MCNC: 150 MG/DL (ref 70–99)
HBA1C MFR BLD HPLC: 5.4 % (ref ?–5.7)
HCT VFR BLD AUTO: 47.2 % (ref 39–53)
HDLC SERPL-MCNC: 24 MG/DL (ref 40–59)
HGB BLD-MCNC: 17.1 G/DL (ref 13–17.5)
IMM GRANULOCYTES # BLD AUTO: 0.03 X10(3) UL (ref 0–1)
IMM GRANULOCYTES NFR BLD: 0.3 %
LDLC SERPL DIRECT ASSAY-MCNC: 113 MG/DL (ref ?–100)
LYMPHOCYTES # BLD AUTO: 0.75 X10(3) UL (ref 1–4)
LYMPHOCYTES NFR BLD AUTO: 8.2 %
MCH RBC QN AUTO: 36.9 PG (ref 26–34)
MCHC RBC AUTO-ENTMCNC: 36.2 G/DL (ref 31–37)
MCV RBC AUTO: 101.7 FL (ref 80–100)
MONOCYTES # BLD AUTO: 0.48 X10(3) UL (ref 0.1–1)
MONOCYTES NFR BLD AUTO: 5.2 %
NEUTROPHILS # BLD AUTO: 7.86 X10 (3) UL (ref 1.5–7.7)
NEUTROPHILS # BLD AUTO: 7.86 X10(3) UL (ref 1.5–7.7)
NEUTROPHILS NFR BLD AUTO: 86 %
NONHDLC SERPL-MCNC: 170 MG/DL (ref ?–130)
OSMOLALITY SERPL CALC.SUM OF ELEC: 292 MOSM/KG (ref 275–295)
P AXIS: 44 DEGREES
P AXIS: 45 DEGREES
P-R INTERVAL: 172 MS
P-R INTERVAL: 172 MS
PLATELET # BLD AUTO: 135 10(3)UL (ref 150–450)
POTASSIUM SERPL-SCNC: 4.7 MMOL/L (ref 3.5–5.1)
Q-T INTERVAL: 396 MS
Q-T INTERVAL: 430 MS
Q-T INTERVAL: 476 MS
QRS DURATION: 86 MS
QRS DURATION: 86 MS
QRS DURATION: 96 MS
QTC CALCULATION (BEZET): 406 MS
QTC CALCULATION (BEZET): 415 MS
QTC CALCULATION (BEZET): 425 MS
R AXIS: -24 DEGREES
R AXIS: -32 DEGREES
R AXIS: 0 DEGREES
RBC # BLD AUTO: 4.64 X10(6)UL (ref 3.8–5.8)
SODIUM SERPL-SCNC: 140 MMOL/L (ref 136–145)
T AXIS: -28 DEGREES
T AXIS: 4 DEGREES
T AXIS: 80 DEGREES
TRIGL SERPL-MCNC: 511 MG/DL (ref 30–149)
TROPONIN I SERPL-MCNC: 111 NG/ML (ref ?–0.04)
VENTRICULAR RATE: 44 BPM
VENTRICULAR RATE: 59 BPM
VENTRICULAR RATE: 66 BPM
WBC # BLD AUTO: 9.2 X10(3) UL (ref 4–11)

## 2019-08-26 PROCEDURE — 99232 SBSQ HOSP IP/OBS MODERATE 35: CPT | Performed by: INTERNAL MEDICINE

## 2019-08-26 PROCEDURE — 99232 SBSQ HOSP IP/OBS MODERATE 35: CPT | Performed by: HOSPITALIST

## 2019-08-26 RX ORDER — MAGNESIUM HYDROXIDE/ALUMINUM HYDROXICE/SIMETHICONE 120; 1200; 1200 MG/30ML; MG/30ML; MG/30ML
30 SUSPENSION ORAL 4 TIMES DAILY PRN
Status: DISCONTINUED | OUTPATIENT
Start: 2019-08-26 | End: 2019-08-30

## 2019-08-26 RX ORDER — ALPRAZOLAM 0.5 MG/1
0.5 TABLET ORAL 2 TIMES DAILY PRN
Status: DISCONTINUED | OUTPATIENT
Start: 2019-08-26 | End: 2019-08-26

## 2019-08-26 RX ORDER — ALPRAZOLAM 1 MG/1
1 TABLET ORAL 2 TIMES DAILY PRN
Status: DISCONTINUED | OUTPATIENT
Start: 2019-08-26 | End: 2019-08-30

## 2019-08-26 RX ORDER — LISINOPRIL 5 MG/1
5 TABLET ORAL DAILY
Status: DISCONTINUED | OUTPATIENT
Start: 2019-08-27 | End: 2019-08-27

## 2019-08-26 RX ORDER — DIAZEPAM 5 MG/1
5 TABLET ORAL NIGHTLY PRN
Status: DISCONTINUED | OUTPATIENT
Start: 2019-08-26 | End: 2019-08-30

## 2019-08-26 RX ORDER — ALPRAZOLAM 1 MG/1
1 TABLET ORAL NIGHTLY PRN
Status: DISCONTINUED | OUTPATIENT
Start: 2019-08-26 | End: 2019-08-26

## 2019-08-26 RX ORDER — LISINOPRIL 2.5 MG/1
2.5 TABLET ORAL ONCE
Status: COMPLETED | OUTPATIENT
Start: 2019-08-26 | End: 2019-08-26

## 2019-08-26 NOTE — PROGRESS NOTES
65 y/o male with fam hx, dyslipidemia and 6 months of occaissional CP. Presents with 2-3 hours of CP and diaphoreseis. Pain improved now. EKG inf post infarct with CHB. Treat with fluids. Needs cath. R,B and A discussed with patient and family.  Will eventua

## 2019-08-26 NOTE — PAYOR COMM NOTE
--------------  ADMISSION REVIEW     PayorAlmita Hauser MA [facility]  Subscriber #:  [id number]  Authorization Number: [id number]    Admit date: 8/25/19  Admit time: 2301       Admitting Physician: Aaron Byrne MD  Attending Physician:  Debora Castro MD  Primary Vitals [08/25/19 2104]   /51   Pulse (!) 43   Resp 18   Temp (!) 95.8 °F (35.4 °C)   Temp src Temporal   SpO2 93 %   O2 Device Nasal cannula     Current:/57   Pulse (!) 41   Temp (!) 95.8 °F (35.4 °C) (Temporal)   Resp 14   Ht 177.8 cm (5' 10\" PCP Israel Huston MD     Chief Complaint: chest pain    History of Present Illness: Laney Reyes is a 66year old male with chest pain. Has had about 2-3 hours of substernal chest, accompanied by dyspnea. No sweating or nausea.   Never had MI bef approximately 1930. Monitor shows SR 50's-60's, 's. S/P cath, with stent x1 to RCA. Right groin D/I, old drainage, soft, non tender to palpation, negative hematoma. Pedal pulses palpable, +2 graded.   Patient c/o mild chest discomfort, 1/10, improve

## 2019-08-26 NOTE — PLAN OF CARE
Assumed care of this patient at approximately 1930. Monitor shows SR 50's-60's, 's. S/P cath, with stent x1 to RCA. Right groin D/I, old drainage, soft, non tender to palpation, negative hematoma. Pedal pulses palpable, +2 graded.   Patient c/o mild

## 2019-08-26 NOTE — PROCEDURES
[facility]    [facility] Cardiac Cath Procedure Note  Issa Pineda Patient Status:  Inpatient    1945 MRN [id number]   [facility] 6NE-A Attending Jalil Wade MD   Hosp Day # 1 PCP Jessica Gorman MD       Cardiologist: Paz Rizvi blood loss: 20 cc  Closure: Perclose for arteriostomy and manual hold for vein  Recommendations: PCI performed Of proximal right coronary artery using a drug-eluting stent x1    Lesion #1 proximal right coronary artery  Lesion Characteristics-moderate tort

## 2019-08-26 NOTE — ED PROVIDER NOTES
Patient Seen in: [facility] Emergency Department    History   Patient presents with:  Chest Pain Angina (cardiovascular)    Stated Complaint:     HPI    Pleasant obese elderly male without any history of heart disease in the emergency department wit HPI.  Constitutional and vital signs reviewed. All other systems reviewed and negative except as noted above.     Physical Exam     ED Triage Vitals [08/25/19 2104]   /51   Pulse (!) 43   Resp 18   Temp (!) 95.8 °F (35.4 °C)   Temp src Temporal tests on the individual orders.    COMP METABOLIC PANEL (14)   LIPID PANEL   TROPONIN I   PRO BETA NATRIURETIC PEPTIDE   PROTHROMBIN TIME (PT)   PTT, ACTIVATED   RAINBOW DRAW BLUE   RAINBOW DRAW LAVENDER   RAINBOW DRAW LIGHT GREEN   RAINBOW DRAW GOLD     EK

## 2019-08-26 NOTE — H&P
[facility]  History and Physical     Camacho Solomon Patient Status:  Emergency    1945 MRN [id number]   Location [address] [facility] [address] Attending Raymon Enrique MD   Hosp Day # 0 PCP Liz Villalpando MD     Chief Complai Diabetes Mother         late onset   • Obesity Mother    • Lung Disorder Sister         smokoing related   • Heart Disorder Brother         bypass surgery   • Hypertension Brother    • Lipids Brother    • Arthritis Brother         Allergies: No Known Aller INR 1.05       No results for input(s): TROP, CK in the last 168 hours. Imaging: Imaging data reviewed in Epic. ASSESSMENT / PLAN:     1.  Acute stemi-ASA/statin; going straight to cath lab now; dr. Maricel Naqvi at bedside; check lipid/a1c; trend trop

## 2019-08-26 NOTE — DIETARY NOTE
Nutrition Short Note    Dietitian consult received per cardiac rehab/CHF protocol. Pt to be educated by cardiac rehab staff and encouraged to attend outpatient classes taught by DIDIER. DIDIER available PRN.     Magda Paul DIDIER, RAMONAN, CSP, CNSC

## 2019-08-26 NOTE — PLAN OF CARE
Rec pt @ 0730, awake, alert & approp. Sitting in chair, ambulated to bathroom, gait steady. Denies c/o pain, nausea, or sob. R groin site stable, no hematoma.

## 2019-08-26 NOTE — PROGRESS NOTES
[facility]  Progress Note    Camacho Solomon Patient Status:  Inpatient    1945 MRN [id number]   [facility] 6NE-A Attending Meenakshi Miranda MD   [facility] Day # 1 PCP Liz Villalpando MD       Subjective:  Overall feels better.  No cp mg Oral Q12H   • aspirin  81 mg Oral Daily     • sodium chloride 83 mL/hr at 08/26/19 0403       Assessment:      1. Acute inferior wall mi with associated chb, hypotension s/p emergent ptca/jose pRCA- ef 50% by cath, 0% stenosis post intervention.  [facility]

## 2019-08-26 NOTE — PROGRESS NOTES
[facility]  Progress Note     Fatou Acron Patient Status:  Inpatient    1945 MRN [id number]   [facility] 6NE-A Attending Fozia Akhtar MD   [facility] Day # 1 PCP Pamela Escobedo MD     Chief Complaint:  Chest pain   S:  Angela Seoil 6. A1c 5.4   7. Lipid- chol- 189, trigl- 537, HDL 25,   8. plts 135K    2  PTSD  - xanax as needed, psych liaison to                   see   3.  Obesity- s/p baratric surgery- no wt loss  BMI 39         PLAN:  brilinta q12 hrs  Cards following  Car

## 2019-08-26 NOTE — ED INITIAL ASSESSMENT (HPI)
PAtient arrives via [facility]; received 324mg ASA, mid sternal chest pain nonradiating 9/10 began 1hr PTA. Bradycardic,hypotensive BP 50/18.

## 2019-08-26 NOTE — PROGRESS NOTES
08/26/19 1527   Clinical Encounter Type   Visited With Patient   Patient's Supportive Strategies/Resources Fr. Donna Mccall provided [employer], support, prayer and Sacrament of the Vincenzo Lamb of Sick-Anointing Anointed    r

## 2019-08-26 NOTE — HISTORICAL OFFICE NOTE
Audi Fuentes MD - 2018 12:00 AM CST  Celso Cho  : 1945  ACCOUNT: [id number]  [phone]  PCP: Dr. Reyna Lynne DATE: 2018  DICTATED BY: [Dr. Sita Tena: [pre-op evaluation.]    HPI: and multiple prior orthopedic surgeries    PAST CV HISTORY: dyslipidemia    FAMILY HISTORY: Negative for premature CAD. Negative for AAA. SOCIAL HISTORY: SMOKING: Never used tobacco. denies smoking. CAFFEINE: none. ALCOHOL: drinks socially.  EXERCISE: no r planned noncardiac procedure (bariatric surgery). High clinical index of suspicion for obstructive sleep apnea with nondiagnostic sleep study by [facility]    ASSESSMENT:  1. Elevated BP (NOT hypertension)  2.  Hyperlipid

## 2019-08-26 NOTE — CONSULTS
[facility]    PATIENT'S NAME: Te Look   ATTENDING PHYSICIAN: Susanne Friedman M.D.   CONSULTING PHYSICIAN: Cheryl Edmond M.D.    PATIENT ACCOUNT#:   [de-identified]    LOCATION:  [facility] 1 EDWP 10  MEDICAL RECORD #:   [id number]       DATE OF catheterization laboratory. Risks, benefits, and alternatives were explained to the patient and the wife.   I am concerned that patient might have an right ventricular infarct given his EKG findings and his diaphoresis, although he is normotensive at this

## 2019-08-27 LAB
ALBUMIN SERPL-MCNC: 3.2 G/DL (ref 3.4–5)
ALBUMIN/GLOB SERPL: 1.1 {RATIO} (ref 1–2)
ALP LIVER SERPL-CCNC: 58 U/L (ref 45–117)
ALT SERPL-CCNC: 101 U/L (ref 16–61)
ANION GAP SERPL CALC-SCNC: 4 MMOL/L (ref 0–18)
AST SERPL-CCNC: 196 U/L (ref 15–37)
BILIRUB SERPL-MCNC: 1 MG/DL (ref 0.1–2)
BUN BLD-MCNC: 13 MG/DL (ref 7–18)
BUN/CREAT SERPL: 13.4 (ref 10–20)
CALCIUM BLD-MCNC: 8.3 MG/DL (ref 8.5–10.1)
CHLORIDE SERPL-SCNC: 107 MMOL/L (ref 98–112)
CHOLEST SMN-MCNC: 164 MG/DL (ref ?–200)
CO2 SERPL-SCNC: 31 MMOL/L (ref 21–32)
CREAT BLD-MCNC: 0.97 MG/DL (ref 0.7–1.3)
DEPRECATED RDW RBC AUTO: 50 FL (ref 35.1–46.3)
ERYTHROCYTE [DISTWIDTH] IN BLOOD BY AUTOMATED COUNT: 13.2 % (ref 11–15)
GLOBULIN PLAS-MCNC: 2.9 G/DL (ref 2.8–4.4)
GLUCOSE BLD-MCNC: 111 MG/DL (ref 70–99)
HCT VFR BLD AUTO: 43.8 % (ref 39–53)
HDLC SERPL-MCNC: 26 MG/DL (ref 40–59)
HGB BLD-MCNC: 15.6 G/DL (ref 13–17.5)
LDLC SERPL CALC-MCNC: 74 MG/DL (ref ?–100)
M PROTEIN MFR SERPL ELPH: 6.1 G/DL (ref 6.4–8.2)
MCH RBC QN AUTO: 37.1 PG (ref 26–34)
MCHC RBC AUTO-ENTMCNC: 35.6 G/DL (ref 31–37)
MCV RBC AUTO: 104 FL (ref 80–100)
NONHDLC SERPL-MCNC: 138 MG/DL (ref ?–130)
OSMOLALITY SERPL CALC.SUM OF ELEC: 295 MOSM/KG (ref 275–295)
PLATELET # BLD AUTO: 115 10(3)UL (ref 150–450)
POTASSIUM SERPL-SCNC: 4.2 MMOL/L (ref 3.5–5.1)
RBC # BLD AUTO: 4.21 X10(6)UL (ref 3.8–5.8)
SODIUM SERPL-SCNC: 142 MMOL/L (ref 136–145)
TRIGL SERPL-MCNC: 320 MG/DL (ref 30–149)
VLDLC SERPL CALC-MCNC: 64 MG/DL (ref 0–30)
WBC # BLD AUTO: 7.7 X10(3) UL (ref 4–11)

## 2019-08-27 PROCEDURE — 99233 SBSQ HOSP IP/OBS HIGH 50: CPT | Performed by: HOSPITALIST

## 2019-08-27 PROCEDURE — 99232 SBSQ HOSP IP/OBS MODERATE 35: CPT | Performed by: INTERNAL MEDICINE

## 2019-08-27 RX ORDER — LISINOPRIL 10 MG/1
10 TABLET ORAL DAILY
Status: DISCONTINUED | OUTPATIENT
Start: 2019-08-28 | End: 2019-08-29

## 2019-08-27 RX ORDER — ROSUVASTATIN CALCIUM 20 MG/1
20 TABLET, COATED ORAL NIGHTLY
Status: DISCONTINUED | OUTPATIENT
Start: 2019-08-27 | End: 2019-08-30

## 2019-08-27 RX ORDER — METOPROLOL SUCCINATE 25 MG/1
25 TABLET, EXTENDED RELEASE ORAL
Status: DISCONTINUED | OUTPATIENT
Start: 2019-08-28 | End: 2019-08-30

## 2019-08-27 NOTE — PAYOR COMM NOTE
--------------  CONTINUED STAY REVIEW    Crystal Estevez MA O  Subscriber #:  [id number]  Authorization Number: [id number]/ [id number]    Admit date: 8/25/19  Admit time: 2301    Admitting Physician: Katie Parry MD  Attending Physician:  Steve [place],    Guide Catheter: [email or web address]  Wire: Pro-water  Mechanical thrombectomy: Multiple runs were performed using Pronto thrombectomy device with minimal improvement in thrombus burden  Pre-dilation Balloon: 2.5 mm x 15 Talisha@[email or web address] SUE  Intravascular ultrasound was perform

## 2019-08-27 NOTE — PROGRESS NOTES
[facility]  Progress Note     Star Starks Patient Status:  Inpatient    1945 MRN [id number]   [facility] 6NE-A Attending Mavis Martinez MD   [facility] Day # 2 PCP Melissa Barksdale MD     Chief Complaint:  Chest pain   S:   D 50%      1. Cards following  2. S/p PCI/stent   RCA ROBERT x1   Thrombus removal    3. ASA/statin;   4. brilinta q12 hrs  / ASA   5. Statin   6. plts drifting down   Cbc am    2  PTSD  - xanax as needed, psych liaison to                   see    3.  Obesity-

## 2019-08-27 NOTE — PLAN OF CARE
Pt received alert/oriented x4, BG, following all commands. Pt denies any pain or SOB at this time but states he occasionally has some chest \"achiness\". Pt does c/o of nausea at times.  Pt requesting medication to help him sleep tonight, states he has chr

## 2019-08-27 NOTE — BH LEVEL OF CARE ASSESSMENT
Level of Care Assessment Note    General Questions  Why are you here?: Patient states he had a heart attack. Precipitating Events: Liaison consult for pt with anxiety. History of Present Illness: 67 y/o patient with history of anxiety.   He states this is No  Do you have a firearm owner ID card?: No    Self Injury  History of Self Injurious Behaviors: No  Present Self-Injurious Behaviors: No    Mental Health Symptoms  Hallucination Type: No problems reported or observed  Delusions: No problems reported or o pattern of use?: 1-2 years  Last Use?: 1 week ago  Is your current use the most/worst it has ever been? : No    Illicit and Prescription Drug Use  Which if any illicit/prescription drugs have you used/abused?: Denies needed  Outpatient Recommendations: Medication management; Therapy  Referral 1: f/u with a psychiatrist and psychotherapist  Refused Treatment: Yes  Refused Treatment Reason: Other  Refused Treatment Other Reason: not interested in any referrals.

## 2019-08-27 NOTE — PROGRESS NOTES
Problem: Patient/Family Goals  Goal: Patient/Family Long Term Goal  Description  Patient's Long Term Goal: Discharge home     Interventions:  - Medications, labs   - See additional Care Plan goals for specific interventions  Outcome: Progressing  Goal: Leighton Peguero Evaluate fluid balance  Outcome: Progressing   Assumed care 0700. O2 sat-96% room air. Tele NSR  With hr-63. Right groin soft with some bruises. Up in chair with all meals. Ambulate in the hallway. Medicate prn for pain. Psych eval today.   DC plan p

## 2019-08-27 NOTE — RESPIRATORY THERAPY NOTE
PT IS A RISK FOR SLEEP APNEA PER BERLING QUESTIONNAIRE, STANDING ORDERS FOR CARLEEN PLACED IN EFFECT PER PROTOCOL TO MONITOR.

## 2019-08-27 NOTE — PLAN OF CARE
Pt is alert x 4, on Ra, NSR/SB on the monitor. PT denies any cardiovascular symptoms at this time. Pt is up with SBA, continent of B/B. Pt was oriented to room, call light return demonstrate. Right groin has some bruising, no swelling, no drainage.  All need GASTROINTESTINAL - ADULT  Goal: Minimal or absence of nausea and vomiting  Description  INTERVENTIONS:  - Maintain adequate hydration with IV or PO as ordered and tolerated  - Nasogastric tube to low intermittent suction as ordered  - Evaluate effectivenes

## 2019-08-27 NOTE — PROGRESS NOTES
[facility]  Cardiology Progress Note    Subjective:  Came to evaluate patient as he was exiting bathroom. Complains of mild dull left sided chest pain and shortness of breath, some of which he feels is chronic.   In addition, he complains of chronic d 111.  Appears to now be maintaining SR/SB with stable b/p. · Dyslipidemia - statin initiated. Will switch to rosuvastatin for long term therapy.   Diet/lifestyle intervention for high TG's would be first line therapy as they are less than 400 on a fasting

## 2019-08-27 NOTE — CM/SW NOTE
Pt had an acute STEMI, s/p PCI/stent  Will need billanta cost information for anticipated discharge tomorrow. Per our C/ [address], pt can qualify for 30 day free coupon, our pharmacy to provide to pt tomorrow morning with am rounds. The cost would be $4

## 2019-08-28 ENCOUNTER — APPOINTMENT (OUTPATIENT)
Dept: ULTRASOUND IMAGING | Facility: HOSPITAL | Age: 74
DRG: 247 | End: 2019-08-28
Attending: HOSPITALIST
Payer: MEDICARE

## 2019-08-28 LAB
ANION GAP SERPL CALC-SCNC: 10 MMOL/L (ref 0–18)
BASOPHILS # BLD AUTO: 0.04 X10(3) UL (ref 0–0.2)
BASOPHILS NFR BLD AUTO: 0.5 %
BUN BLD-MCNC: 21 MG/DL (ref 7–18)
BUN/CREAT SERPL: 19.8 (ref 10–20)
CALCIUM BLD-MCNC: 8.8 MG/DL (ref 8.5–10.1)
CHLORIDE SERPL-SCNC: 105 MMOL/L (ref 98–112)
CO2 SERPL-SCNC: 24 MMOL/L (ref 21–32)
CREAT BLD-MCNC: 1.06 MG/DL (ref 0.7–1.3)
DEPRECATED RDW RBC AUTO: 50.6 FL (ref 35.1–46.3)
EOSINOPHIL # BLD AUTO: 0.05 X10(3) UL (ref 0–0.7)
EOSINOPHIL NFR BLD AUTO: 0.7 %
ERYTHROCYTE [DISTWIDTH] IN BLOOD BY AUTOMATED COUNT: 13.1 % (ref 11–15)
GLUCOSE BLD-MCNC: 142 MG/DL (ref 70–99)
HCT VFR BLD AUTO: 44.6 % (ref 39–53)
HGB BLD-MCNC: 15.4 G/DL (ref 13–17.5)
IMM GRANULOCYTES # BLD AUTO: 0.04 X10(3) UL (ref 0–1)
IMM GRANULOCYTES NFR BLD: 0.5 %
LYMPHOCYTES # BLD AUTO: 1.25 X10(3) UL (ref 1–4)
LYMPHOCYTES NFR BLD AUTO: 16.6 %
MCH RBC QN AUTO: 36.6 PG (ref 26–34)
MCHC RBC AUTO-ENTMCNC: 34.5 G/DL (ref 31–37)
MCV RBC AUTO: 105.9 FL (ref 80–100)
MONOCYTES # BLD AUTO: 0.9 X10(3) UL (ref 0.1–1)
MONOCYTES NFR BLD AUTO: 11.9 %
NEUTROPHILS # BLD AUTO: 5.26 X10 (3) UL (ref 1.5–7.7)
NEUTROPHILS # BLD AUTO: 5.26 X10(3) UL (ref 1.5–7.7)
NEUTROPHILS NFR BLD AUTO: 69.8 %
OSMOLALITY SERPL CALC.SUM OF ELEC: 293 MOSM/KG (ref 275–295)
PLATELET # BLD AUTO: 108 10(3)UL (ref 150–450)
POTASSIUM SERPL-SCNC: 3.5 MMOL/L (ref 3.5–5.1)
RBC # BLD AUTO: 4.21 X10(6)UL (ref 3.8–5.8)
SODIUM SERPL-SCNC: 139 MMOL/L (ref 136–145)
T4 FREE SERPL-MCNC: 0.7 NG/DL (ref 0.8–1.7)
TSI SER-ACNC: 3.76 MIU/ML (ref 0.36–3.74)
WBC # BLD AUTO: 7.5 X10(3) UL (ref 4–11)

## 2019-08-28 PROCEDURE — 76700 US EXAM ABDOM COMPLETE: CPT | Performed by: HOSPITALIST

## 2019-08-28 PROCEDURE — 99232 SBSQ HOSP IP/OBS MODERATE 35: CPT | Performed by: HOSPITALIST

## 2019-08-28 PROCEDURE — 99233 SBSQ HOSP IP/OBS HIGH 50: CPT | Performed by: INTERNAL MEDICINE

## 2019-08-28 RX ORDER — ROSUVASTATIN CALCIUM 20 MG/1
20 TABLET, COATED ORAL NIGHTLY
Qty: 30 TABLET | Refills: 5 | Status: SHIPPED | OUTPATIENT
Start: 2019-08-28 | End: 2019-08-28

## 2019-08-28 RX ORDER — LISINOPRIL 10 MG/1
10 TABLET ORAL DAILY
Qty: 30 TABLET | Refills: 5 | Status: SHIPPED | OUTPATIENT
Start: 2019-08-29 | End: 2019-08-30

## 2019-08-28 RX ORDER — ASPIRIN 81 MG/1
81 TABLET ORAL DAILY
Qty: 30 TABLET | Refills: 11 | Status: SHIPPED | OUTPATIENT
Start: 2019-08-29 | End: 2020-02-13

## 2019-08-28 RX ORDER — METOPROLOL SUCCINATE 25 MG/1
25 TABLET, EXTENDED RELEASE ORAL
Qty: 30 TABLET | Refills: 5 | Status: SHIPPED | OUTPATIENT
Start: 2019-08-29 | End: 2019-08-30

## 2019-08-28 RX ORDER — ASPIRIN 81 MG/1
81 TABLET ORAL DAILY
Qty: 30 TABLET | Refills: 11 | Status: SHIPPED | OUTPATIENT
Start: 2019-08-29 | End: 2019-08-28

## 2019-08-28 RX ORDER — POTASSIUM CHLORIDE 20 MEQ/1
40 TABLET, EXTENDED RELEASE ORAL EVERY 4 HOURS
Status: COMPLETED | OUTPATIENT
Start: 2019-08-28 | End: 2019-08-28

## 2019-08-28 RX ORDER — ROSUVASTATIN CALCIUM 20 MG/1
20 TABLET, COATED ORAL NIGHTLY
Qty: 30 TABLET | Refills: 5 | Status: SHIPPED | OUTPATIENT
Start: 2019-08-28

## 2019-08-28 RX ORDER — METOPROLOL SUCCINATE 25 MG/1
25 TABLET, EXTENDED RELEASE ORAL
Qty: 30 TABLET | Refills: 5 | Status: SHIPPED | OUTPATIENT
Start: 2019-08-29 | End: 2019-08-28

## 2019-08-28 RX ORDER — LISINOPRIL 10 MG/1
10 TABLET ORAL DAILY
Qty: 30 TABLET | Refills: 5 | Status: SHIPPED | OUTPATIENT
Start: 2019-08-29 | End: 2019-08-28

## 2019-08-28 RX ORDER — METOPROLOL TARTRATE 5 MG/5ML
5 INJECTION INTRAVENOUS ONCE
Status: COMPLETED | OUTPATIENT
Start: 2019-08-28 | End: 2019-08-28

## 2019-08-28 NOTE — PROGRESS NOTES
[facility]  Progress Note     Camacho Solomon Patient Status:  Inpatient    1945 MRN [id number]   [facility] 6NE-A Attending Meenakshi Miranda MD   [facility] Day # 3 PCP Liz Villalpando MD     Chief Complaint:  Chest pain   S: 50%      1. Cards following  2. S/p PCI/stent   RCA ROBERT x1   Thrombus removal   3. ASA/statin;   4. brilinta q12 hrs  / ASA   5. Statin   6. plts drifting down   Cbc am    2  PTSD  - xanax as needed, psych liaison to                   see    3.  Obesity- s

## 2019-08-28 NOTE — PLAN OF CARE
Assumed care for pt at 19:30 on 8/27  S/p emergent ROBERT to RCA on 8/25 via right groin. Clean, dry, soft, ecchymosis in area  Bilateral pedal pulse +2    A&Ox4  Denies pain or SOB.  Reports chronic insomnia, prn nightly trazadone + valium given  VSS on Ra, s tolerated, if ordered  - Obtain nutritional consult as needed  - Evaluate fluid balance  Outcome: Progressing

## 2019-08-28 NOTE — PROGRESS NOTES
[facility]  Cardiology Progress Note    Gopi Sharpe Patient Status:  Inpatient    1945 MRN [id number]   [facility] 2NE-A Attending Osbaldo Delacruz [facility] Day # 3 PCP Ron Krishnan MD     Subjective:  No complain SB/SR  · Dyslipidemia - statin initiated. Will switch to rosuvastatin for long term therapy. · HTN  · Elevated lfts - US abdomen with suggestion of fatty liver  · Thrombocytopenia - Platelets 589 today.   Review of chart shows history of thrombocytopeni

## 2019-08-28 NOTE — PROGRESS NOTES
Problem: CARDIOVASCULAR - ADULT  Goal: Maintains optimal cardiac output and hemodynamic stability  Description  INTERVENTIONS:  - Monitor vital signs, rhythm, and trends  - Monitor for bleeding, hypotension and signs of decreased cardiac output  - Evaluate

## 2019-08-29 LAB
ANION GAP SERPL CALC-SCNC: 5 MMOL/L (ref 0–18)
ATRIAL RATE: 119 BPM
ATRIAL RATE: 57 BPM
BUN BLD-MCNC: 17 MG/DL (ref 7–18)
BUN/CREAT SERPL: 18.9 (ref 10–20)
CALCIUM BLD-MCNC: 8.4 MG/DL (ref 8.5–10.1)
CHLORIDE SERPL-SCNC: 108 MMOL/L (ref 98–112)
CO2 SERPL-SCNC: 27 MMOL/L (ref 21–32)
CREAT BLD-MCNC: 0.9 MG/DL (ref 0.7–1.3)
GLUCOSE BLD-MCNC: 99 MG/DL (ref 70–99)
OSMOLALITY SERPL CALC.SUM OF ELEC: 292 MOSM/KG (ref 275–295)
P AXIS: 6 DEGREES
P-R INTERVAL: 158 MS
POTASSIUM SERPL-SCNC: 4.2 MMOL/L (ref 3.5–5.1)
Q-T INTERVAL: 346 MS
Q-T INTERVAL: 436 MS
QRS DURATION: 82 MS
QRS DURATION: 86 MS
QTC CALCULATION (BEZET): 424 MS
QTC CALCULATION (BEZET): 499 MS
R AXIS: -29 DEGREES
R AXIS: -31 DEGREES
SODIUM SERPL-SCNC: 140 MMOL/L (ref 136–145)
T AXIS: -36 DEGREES
T AXIS: -39 DEGREES
VENTRICULAR RATE: 125 BPM
VENTRICULAR RATE: 57 BPM

## 2019-08-29 PROCEDURE — 99232 SBSQ HOSP IP/OBS MODERATE 35: CPT | Performed by: HOSPITALIST

## 2019-08-29 PROCEDURE — 99232 SBSQ HOSP IP/OBS MODERATE 35: CPT | Performed by: INTERNAL MEDICINE

## 2019-08-29 RX ORDER — POLYETHYLENE GLYCOL 3350 17 G/17G
17 POWDER, FOR SOLUTION ORAL DAILY PRN
Status: DISCONTINUED | OUTPATIENT
Start: 2019-08-29 | End: 2019-08-30

## 2019-08-29 RX ORDER — BENZONATATE 100 MG/1
100 CAPSULE ORAL 3 TIMES DAILY PRN
Status: DISCONTINUED | OUTPATIENT
Start: 2019-08-29 | End: 2019-08-30

## 2019-08-29 RX ORDER — MORPHINE SULFATE 4 MG/ML
2 INJECTION, SOLUTION INTRAMUSCULAR; INTRAVENOUS EVERY 2 HOUR PRN
Status: DISCONTINUED | OUTPATIENT
Start: 2019-08-29 | End: 2019-08-30

## 2019-08-29 RX ORDER — NITROGLYCERIN 0.4 MG/1
TABLET SUBLINGUAL
Status: COMPLETED
Start: 2019-08-29 | End: 2019-08-29

## 2019-08-29 RX ORDER — NITROGLYCERIN 20 MG/100ML
10 INJECTION INTRAVENOUS CONTINUOUS
Status: DISCONTINUED | OUTPATIENT
Start: 2019-08-29 | End: 2019-08-29

## 2019-08-29 RX ORDER — LISINOPRIL 20 MG/1
20 TABLET ORAL DAILY
Status: DISCONTINUED | OUTPATIENT
Start: 2019-08-30 | End: 2019-08-30

## 2019-08-29 RX ORDER — LISINOPRIL 10 MG/1
10 TABLET ORAL ONCE
Status: COMPLETED | OUTPATIENT
Start: 2019-08-29 | End: 2019-08-29

## 2019-08-29 NOTE — PROGRESS NOTES
[facility]  Cardiology Progress Note    Subjective:  No chest pain or shortness of breath at present. Apparently woke up last evening feeling \"hot\" and complained of 10/10 right sided chest pain.   Placed on nitro gtt early this morning - this evans statin initiated. Switched to rosuvastatin for long term therapy. Diet/lifestyle intervention for high TG's for now.   · HTN - controlled  · Elevated LFT's - probable fatty liver due to metabolic syndrome with glucose intolerance. Abd u/s suggests fatty i

## 2019-08-29 NOTE — PROGRESS NOTES
[facility]  Progress Note     Mena Hernandez Patient Status:  Inpatient    1945 MRN [id number]   [facility] 6NE-A Attending Debora Castro MD   [facility] Day # 4 PCP Eris Turner MD     Chief Complaint:  Chest pain   S:   W mg/dL).   Recent Labs   Lab 08/25/19 2101   PTP 14.2   INR 1.05     Recent Labs   Lab 08/25/19 2101 08/26/19  0420   TROP <0.045 111.000*     Lab Results   Component Value Date    TSH 3.760 08/28/2019    T4F 0.7 08/28/2019     Imaging: Imaging data review

## 2019-08-29 NOTE — PROGRESS NOTES
0100 Patient called and stated that he woke up out of sleep with a feeling of heat spread thru his body. Patient afebrile. He also stated that he had 10/10 right sided stabbing chest pain. O2 applied.  SL nitro given which brought his pain to 4/10 and a du

## 2019-08-29 NOTE — PLAN OF CARE
Patient remains in NSR. Here post MI with ROBERT to RCA. Right groin stable but bruised. Patient upset that he did not get to go home today. He stated that his heart was racing because he is \"out of shape\". Plan of care updated with patient.       P

## 2019-08-30 VITALS
HEART RATE: 66 BPM | BODY MASS INDEX: 38.44 KG/M2 | SYSTOLIC BLOOD PRESSURE: 107 MMHG | RESPIRATION RATE: 18 BRPM | TEMPERATURE: 98 F | WEIGHT: 268.5 LBS | HEIGHT: 70 IN | DIASTOLIC BLOOD PRESSURE: 70 MMHG | OXYGEN SATURATION: 95 %

## 2019-08-30 LAB
ANION GAP SERPL CALC-SCNC: 8 MMOL/L (ref 0–18)
ATRIAL RATE: 62 BPM
ATRIAL RATE: 67 BPM
BUN BLD-MCNC: 15 MG/DL (ref 7–18)
BUN/CREAT SERPL: 16.3 (ref 10–20)
CALCIUM BLD-MCNC: 9.1 MG/DL (ref 8.5–10.1)
CHLORIDE SERPL-SCNC: 106 MMOL/L (ref 98–112)
CO2 SERPL-SCNC: 24 MMOL/L (ref 21–32)
CREAT BLD-MCNC: 0.92 MG/DL (ref 0.7–1.3)
GLUCOSE BLD-MCNC: 101 MG/DL (ref 70–99)
HAV IGM SER QL: 2.1 MG/DL (ref 1.6–2.6)
OSMOLALITY SERPL CALC.SUM OF ELEC: 287 MOSM/KG (ref 275–295)
P AXIS: 17 DEGREES
P AXIS: 24 DEGREES
P-R INTERVAL: 154 MS
P-R INTERVAL: 160 MS
POTASSIUM SERPL-SCNC: 3.9 MMOL/L (ref 3.5–5.1)
Q-T INTERVAL: 394 MS
Q-T INTERVAL: 400 MS
QRS DURATION: 82 MS
QRS DURATION: 86 MS
QTC CALCULATION (BEZET): 406 MS
QTC CALCULATION (BEZET): 416 MS
R AXIS: -23 DEGREES
R AXIS: -23 DEGREES
SODIUM SERPL-SCNC: 138 MMOL/L (ref 136–145)
T AXIS: -29 DEGREES
T AXIS: -35 DEGREES
VENTRICULAR RATE: 62 BPM
VENTRICULAR RATE: 67 BPM

## 2019-08-30 PROCEDURE — 99221 1ST HOSP IP/OBS SF/LOW 40: CPT | Performed by: INTERNAL MEDICINE

## 2019-08-30 PROCEDURE — 99239 HOSP IP/OBS DSCHRG MGMT >30: CPT | Performed by: HOSPITALIST

## 2019-08-30 RX ORDER — LISINOPRIL 20 MG/1
20 TABLET ORAL DAILY
Qty: 30 TABLET | Refills: 1 | Status: SHIPPED | OUTPATIENT
Start: 2019-08-31

## 2019-08-30 RX ORDER — SOTALOL HYDROCHLORIDE 80 MG/1
80 TABLET ORAL EVERY 12 HOURS SCHEDULED
Status: DISCONTINUED | OUTPATIENT
Start: 2019-08-30 | End: 2019-08-30

## 2019-08-30 RX ORDER — GUAIFENESIN 600 MG
600 TABLET, EXTENDED RELEASE 12 HR ORAL 2 TIMES DAILY
Status: DISCONTINUED | OUTPATIENT
Start: 2019-08-30 | End: 2019-08-30

## 2019-08-30 RX ORDER — SOTALOL HYDROCHLORIDE 80 MG/1
80 TABLET ORAL EVERY 12 HOURS SCHEDULED
Qty: 60 TABLET | Refills: 2 | Status: ON HOLD | OUTPATIENT
Start: 2019-08-30 | End: 2021-03-08

## 2019-08-30 NOTE — PLAN OF CARE
Pt had a few short runs of a-fib earlier this evening, then @0400 pt has been in and out of a-fib. SB HR 44- A_fib 135. Pt asymptomatic with this. Denies chest pain or palpitations.  Pt very upset that he is woken up and states this has been \"A horrible 5

## 2019-08-30 NOTE — PLAN OF CARE
Assumed care of pt @ 1530. AOx4. On RA, denies SOB. NSR on tele. Pt denies chest pain. Tolerating ambulating in the halls without pain. Pt anxious to go home this evening.  Per Cardiology, they would like pt to stay overnight to continue to monitor HR, poss

## 2019-08-30 NOTE — DIETARY NOTE
[address]     Admitting diagnosis:  Acute ST elevation myocardial infarction (STEMI) involving other coronary artery of inferior wall (HCC) [I21.19]    Ht: 177.8 cm (5' 10\")  Wt: 121.8 kg (268 lb 8.3 oz).

## 2019-08-30 NOTE — PLAN OF CARE
Assumed care of pt @2330. Pt axox4. Denies chest pain or sob. Sinus zuleika on tele. Pt did have a burst of PAT @0137, asymptomatic-sleeping. Right groin bruised, soft, lashay. Pedal pulses +2. Lungs clear, diminished. Abdomen soft, bs +4.  Pt voiding without di

## 2019-08-30 NOTE — CONSULTS
[facility]  Report of Consultation    Issa Pineda Patient Status:  Inpatient    1945 MRN [id number]   [facility] 2NE-A [facility] Day # 5 PCP Jessica Gorman MD metoprolol  5. F/u with me in 1 month to see how he is doing on the sotalol    History of Present Illness:  Mallory Monge is a a(n) 66year old male with a h/o obesity and borderline DM who was admitted on 8/25 with an acute inferior STEMI.   Also pre Performed by Domingo Lopez MD at [address]   • NEEDLE BIOPSY LIVER     • REPAIR ROTATOR CUFF,ACUTE Right    • TONSILLECTOMY     • WRIST FRACTURE SURGERY Right      Family History   Problem Relation Age of Onset   • Stroke Father °C), temperature source Oral, resp. rate 20, height 177.8 cm (5' 10\"), weight 268 lb 8.3 oz (121.8 kg), SpO2 95 %.   Temp (24hrs), Av.2 °F (36.8 °C), Min:98.2 °F (36.8 °C), Max:98.2 °F (36.8 °C)    Wt Readings from Last 3 Encounters:  19 : 268 lb

## 2019-08-30 NOTE — PROGRESS NOTES
D.c to home in stable condition  Tele and IV removed without complication  meds to bed service for all new scripts  Right groin c/d/i-home care reviewed  All d/c instruction including meds, groin care and follow up care were reviewed with pt and spouse  Al

## 2019-08-30 NOTE — PROGRESS NOTES
[facility]  Progress Note     Josh Hilliard Patient Status:  Inpatient    1945 MRN [id number]   [facility] 6NE-A Attending Steve Camarillo MD   [facility] Day # 5 PCP Eladio Carvajal MD     Chief Complaint:  Chest pain   S:   N (based on SCr of 0.92 mg/dL). Recent Labs   Lab 08/25/19  2101   PTP 14.2   INR 1.05     Recent Labs   Lab 08/25/19  2101 08/26/19  0420   TROP <0.045 111.000*        Imaging: Imaging data reviewed in Epic. ASSESSMENT / PLAN:      1.  Acute stemi- inf wal

## 2019-08-30 NOTE — PLAN OF CARE
Received patient this am AAOX4  SR on tele. Patient with PAF over this admission  EP eval this am and sotolol ordered along with xarelto. Education provided. EKG post sotolol reviewed with cardiology APN, DORI HernandezL. [place] for d/c to home.    Right groin c

## 2019-08-31 NOTE — DISCHARGE SUMMARY
[facility]  DISCHARGE SUMMARY     Thelma Connell Patient Status:  Inpatient    1945 MRN [id number]   [facility] 2NE-A Attending No att. providers found   Hosp Day # 5 PCP Niecy Gasca MD     Date of Admission: 2019 morning postprocedure troponin was 111. Patient was started on Brilinta beta-blocker aspirin statin. Patient was having some mild shortness of breath-and some mild chest discomfort. .  Patient was observed he was eventually transferred out of the ICU.   Pa 8/25 1) Left Ventriculography at 30 degrees CUEVAS: LVEF: 50%, mild inferior basal hypokinesis  2) Hemodynamics: LVEDP: 22 mmHg, no gradient across aortic valve  3) Coronaries:  · Left main: No significant stenosis  · LAD: Use of 1 diagonal, no significant st every 12 (twelve) hours. Quantity:  60 tablet  Refills:  2     ticagrelor 90 MG Tabs  Commonly known as:  BRILINTA      Take 1 tablet (90 mg total) by mouth every 12 (twelve) hours.    Quantity:  60 tablet  Refills:  11        CONTINUE taking these medica [18-20] 18  BP: ()/(50-70) 107/70    Physical Exam:    General: No acute distress. AO   Respiratory: Good inspiratory effort. Clear to auscultation bilaterally. No rhonchi. NC overnoc   Cardiovascular: S1, S2. Regular rhythm, regular rate.   No murmur

## 2019-09-01 NOTE — PAYOR COMM NOTE
--------------  DISCHARGE REVIEW    [facility]  Subscriber #:  [id number]  Authorization Number: [id number]/ [id number]    Admit date: 8/25/19  Admit time:  2301  Discharge Date: 8/30/2019  2:16 PM     Admitting Physician: Bernhard Severance, MD  Atte

## 2019-09-03 ENCOUNTER — PATIENT OUTREACH (OUTPATIENT)
Dept: CASE MANAGEMENT | Age: 74
End: 2019-09-03

## 2019-09-03 ENCOUNTER — OFFICE VISIT (OUTPATIENT)
Dept: CARDIOLOGY | Age: 74
End: 2019-09-03

## 2019-09-03 VITALS
HEART RATE: 64 BPM | DIASTOLIC BLOOD PRESSURE: 74 MMHG | RESPIRATION RATE: 16 BRPM | WEIGHT: 258 LBS | BODY MASS INDEX: 36.94 KG/M2 | HEIGHT: 70 IN | SYSTOLIC BLOOD PRESSURE: 102 MMHG

## 2019-09-03 DIAGNOSIS — E78.2 HYPERLIPIDEMIA, MIXED: Primary | ICD-10-CM

## 2019-09-03 DIAGNOSIS — E66.01 OBESITY, MORBID (CMD): ICD-10-CM

## 2019-09-03 DIAGNOSIS — Z02.9 ENCOUNTERS FOR UNSPECIFIED ADMINISTRATIVE PURPOSE: ICD-10-CM

## 2019-09-03 DIAGNOSIS — F43.10 PTSD (POST-TRAUMATIC STRESS DISORDER): ICD-10-CM

## 2019-09-03 DIAGNOSIS — Z95.5 PRESENCE OF STENT IN RIGHT CORONARY ARTERY: ICD-10-CM

## 2019-09-03 DIAGNOSIS — I21.19 ACUTE ST ELEVATION MYOCARDIAL INFARCTION (STEMI) INVOLVING OTHER CORONARY ARTERY OF INFERIOR WALL (HCC): ICD-10-CM

## 2019-09-03 DIAGNOSIS — R74.8 ELEVATED LIVER ENZYMES: ICD-10-CM

## 2019-09-03 DIAGNOSIS — I21.11 ACUTE ST ELEVATION MYOCARDIAL INFARCTION (STEMI) INVOLVING RIGHT CORONARY ARTERY (CMD): ICD-10-CM

## 2019-09-03 DIAGNOSIS — I10 ESSENTIAL HYPERTENSION: ICD-10-CM

## 2019-09-03 DIAGNOSIS — D69.6 THROMBOCYTOPENIA (CMD): ICD-10-CM

## 2019-09-03 DIAGNOSIS — K76.0 FATTY LIVER: ICD-10-CM

## 2019-09-03 DIAGNOSIS — I21.3 ACUTE ST ELEVATION MYOCARDIAL INFARCTION (STEMI), UNSPECIFIED ARTERY (HCC): ICD-10-CM

## 2019-09-03 DIAGNOSIS — I25.10 CORONARY ARTERY DISEASE INVOLVING NATIVE CORONARY ARTERY OF NATIVE HEART WITHOUT ANGINA PECTORIS: ICD-10-CM

## 2019-09-03 PROBLEM — I21.9 ACUTE MYOCARDIAL INFARCTION (CMD): Status: ACTIVE | Noted: 2019-08-25

## 2019-09-03 PROCEDURE — 3074F SYST BP LT 130 MM HG: CPT | Performed by: NURSE PRACTITIONER

## 2019-09-03 PROCEDURE — 3078F DIAST BP <80 MM HG: CPT | Performed by: NURSE PRACTITIONER

## 2019-09-03 PROCEDURE — 99215 OFFICE O/P EST HI 40 MIN: CPT | Performed by: NURSE PRACTITIONER

## 2019-09-03 PROCEDURE — 1111F DSCHRG MED/CURRENT MED MERGE: CPT

## 2019-09-03 RX ORDER — SOTALOL HYDROCHLORIDE 80 MG/1
80 TABLET ORAL
COMMUNITY
Start: 2019-08-30 | End: 2019-11-26 | Stop reason: SDUPTHER

## 2019-09-03 RX ORDER — ROSUVASTATIN CALCIUM 20 MG/1
20 TABLET, COATED ORAL
COMMUNITY
Start: 2019-08-28 | End: 2020-02-26 | Stop reason: SDUPTHER

## 2019-09-03 RX ORDER — LISINOPRIL 20 MG/1
20 TABLET ORAL
COMMUNITY
Start: 2019-08-31 | End: 2019-10-24 | Stop reason: SDUPTHER

## 2019-09-03 RX ORDER — ASPIRIN 81 MG/1
81 TABLET ORAL
COMMUNITY
Start: 2018-02-12 | End: 2019-10-25 | Stop reason: ALTCHOICE

## 2019-09-03 RX ORDER — DIAZEPAM 5 MG/1
5 TABLET ORAL NIGHTLY PRN
COMMUNITY
Start: 2019-05-14

## 2019-09-03 ASSESSMENT — ENCOUNTER SYMPTOMS
BACK PAIN: 1
COUGH: 1
LIGHT-HEADEDNESS: 1

## 2019-09-03 NOTE — PROGRESS NOTES
Initial Post Discharge Follow Up   Discharge Date: 8/30/19  Contact Date: 9/3/2019    Consent Verification:  Assessment Completed With: Patient  HIPAA Verified?   Yes    Discharge Dx:    STEMI, S/P left heart cath 8/25/19 thrombus removed from RCA placem 1 tablet (81 mg total) by mouth daily. Disp: 30 tablet Rfl: 11   Rosuvastatin Calcium 20 MG Oral Tab Take 1 tablet (20 mg total) by mouth nightly.  Disp: 30 tablet Rfl: 5   ticagrelor 90 MG Oral Tab Take 1 tablet (90 mg total) by mouth every 12 (twelve) heriberto 9/19/19     Needs post D/C:   Now that you are home, are there any needs or concerns you need addressed before your next visit with your PCP?  (DME, meds, disease concerns, Etc): No     Follow up appointments:      Your appointments     Date & Time Appoint infection/blood clots, patient instructed to report any new or worsening symptoms, when to call the doctor and when to call 911. Patient verbalized understanding of these. NCM instructed patient to call PCP with any questions or needs, he states he will.

## 2019-09-04 ENCOUNTER — TELEPHONE (OUTPATIENT)
Dept: CARDIOLOGY | Age: 74
End: 2019-09-04

## 2019-09-04 ENCOUNTER — OFFICE VISIT (OUTPATIENT)
Dept: INTERNAL MEDICINE CLINIC | Facility: CLINIC | Age: 74
End: 2019-09-04
Payer: MEDICARE

## 2019-09-04 VITALS
WEIGHT: 258.63 LBS | BODY MASS INDEX: 37.02 KG/M2 | HEART RATE: 59 BPM | RESPIRATION RATE: 18 BRPM | DIASTOLIC BLOOD PRESSURE: 66 MMHG | HEIGHT: 70 IN | SYSTOLIC BLOOD PRESSURE: 120 MMHG | TEMPERATURE: 98 F | OXYGEN SATURATION: 97 %

## 2019-09-04 DIAGNOSIS — F43.10 PTSD (POST-TRAUMATIC STRESS DISORDER): ICD-10-CM

## 2019-09-04 DIAGNOSIS — I10 ESSENTIAL HYPERTENSION: ICD-10-CM

## 2019-09-04 DIAGNOSIS — E66.01 SEVERE OBESITY (BMI 35.0-35.9 WITH COMORBIDITY) (HCC): ICD-10-CM

## 2019-09-04 DIAGNOSIS — I25.119 CORONARY ARTERY DISEASE INVOLVING NATIVE CORONARY ARTERY OF NATIVE HEART WITH ANGINA PECTORIS (HCC): ICD-10-CM

## 2019-09-04 DIAGNOSIS — I21.19 ACUTE ST ELEVATION MYOCARDIAL INFARCTION (STEMI) INVOLVING OTHER CORONARY ARTERY OF INFERIOR WALL (HCC): Primary | ICD-10-CM

## 2019-09-04 DIAGNOSIS — I48.0 PAROXYSMAL A-FIB (HCC): ICD-10-CM

## 2019-09-04 DIAGNOSIS — Z95.5 S/P DRUG ELUTING CORONARY STENT PLACEMENT: ICD-10-CM

## 2019-09-04 DIAGNOSIS — K76.0 FATTY LIVER: ICD-10-CM

## 2019-09-04 DIAGNOSIS — E78.2 HYPERCHOLESTEROLEMIA WITH HYPERTRIGLYCERIDEMIA: ICD-10-CM

## 2019-09-04 PROCEDURE — 99495 TRANSJ CARE MGMT MOD F2F 14D: CPT | Performed by: CLINICAL NURSE SPECIALIST

## 2019-09-04 PROCEDURE — 1111F DSCHRG MED/CURRENT MED MERGE: CPT | Performed by: CLINICAL NURSE SPECIALIST

## 2019-09-04 PROCEDURE — 93000 ELECTROCARDIOGRAM COMPLETE: CPT | Performed by: CLINICAL NURSE SPECIALIST

## 2019-09-04 RX ORDER — POLYETHYLENE GLYCOL 3350 17 G/17G
17 POWDER, FOR SOLUTION ORAL DAILY PRN
COMMUNITY
End: 2020-07-29 | Stop reason: ALTCHOICE

## 2019-09-04 NOTE — PROGRESS NOTES
[facility] PHARMACIST MEDICATION RECONCILIATION        Mena Hernandez MRN [id number]    1945 PCP Eris Turner MD       Comments: Medication history completed in [address] [place] [address] by pharmacist. No discrepancies have been indication, timing of administration, monitoring parameters, and potential side effects of medications. Patient confirmed understanding.      Thank you,    Susana Coats, [address] [place] [address], 9/4/2019, 7:23 AM  [facility]

## 2019-09-04 NOTE — PATIENT INSTRUCTIONS
Patient Instructions:  1. Continue medications as prescribed at hospital discharge (Contact your preferred pharmacy - [facility] - this week to request transfer of prescriptions for refill). 2.  Monitor blood pressure at home every morning  3.   Follow up

## 2019-09-04 NOTE — PROGRESS NOTES
Dana Roman 6      HISTORY   CHIEF COMPLAINT: post hospital follow up visit  HPI: Svitlana Geiger is a 66year old male here today for follow up for chest pain with dyspnea.  Svitlana Geiger was discharged from [facility] ([facility]) Oral Chew Tab Chew by mouth. Disp:  Rfl:      No current facility-administered medications for this visit.      HISTORY: reconciled and reviewed with patient  Past Medical History:   Diagnosis Date   • Anes • Arthritis Brother       Social History    Tobacco Use      Smoking status: Former Smoker      Smokeless tobacco: Never Used    Alcohol use: Yes      Frequency: 2-3 times a week      Drinks per session: 1 or 2      Binge frequency: Never    Drug use: No 12/20/2016   • Pneumococcal (Prevnar 13) 12/20/2016   • Pneumovax 23 12/16/2016       ROS:  GENERAL: weight stable, energy stable, denies fever, weakness  RESPIRATORY: reports chronic cough, reports dyspnea with exertion  CARDIOVASCULAR: denies syncope, ch comorbidity) ([facility] [address]    7. Hypercholesterolemia with hypertriglyceridemia  · Crestor    8. Fatty liver  · Cardiac APN has ordered liver enzymes as outpatient    9.   Chronic Constipation  · Patient states he manages constipation with Miralax PRN      No order treatments  ? specialists already aware of assumption/resumption of care  ? Education given to patient on self-management, independent living, and activities of daily living. ?  Referrals as listed below in orders Assisted in scheduling required follow-up

## 2019-09-06 ENCOUNTER — TELEPHONE (OUTPATIENT)
Dept: FAMILY MEDICINE CLINIC | Facility: CLINIC | Age: 74
End: 2019-09-06

## 2019-09-06 ENCOUNTER — TELEPHONE (OUTPATIENT)
Dept: INTERNAL MEDICINE CLINIC | Facility: CLINIC | Age: 74
End: 2019-09-06

## 2019-09-06 NOTE — TELEPHONE ENCOUNTER
Patient contacted [facility] with several clarification questions regarding plan of care. Patient concerned about transferring prescriptions to [place] [id number]. Also requesting stress test be rescheduled. Prescriptions have been transferred.   Stress test reschedu

## 2019-09-06 NOTE — TELEPHONE ENCOUNTER
Pt called our office this AM requesting an appointment with Dr Jennifer Fong, Pt was advised that he is currently seeing the [facility] and he should follow up with them. Pt said he had \"other\" issues and needed to discuss them with Dr Jennifer Fong.    Pt also agnes

## 2019-09-06 NOTE — TELEPHONE ENCOUNTER
Called [facility] and confirmed Pt was seeing them and they did not order any additional labs, also the would not send patients to an outside lab because they draw their own.   I asked if ADEOLA Auguste could be notified that the patient seems to be q

## 2019-09-10 ENCOUNTER — OFFICE VISIT (OUTPATIENT)
Dept: FAMILY MEDICINE CLINIC | Facility: CLINIC | Age: 74
End: 2019-09-10
Payer: MEDICARE

## 2019-09-10 VITALS
BODY MASS INDEX: 36.68 KG/M2 | TEMPERATURE: 98 F | WEIGHT: 256.19 LBS | RESPIRATION RATE: 14 BRPM | HEIGHT: 70 IN | DIASTOLIC BLOOD PRESSURE: 64 MMHG | SYSTOLIC BLOOD PRESSURE: 120 MMHG | HEART RATE: 60 BPM

## 2019-09-10 DIAGNOSIS — E66.01 MORBID OBESITY (HCC): ICD-10-CM

## 2019-09-10 DIAGNOSIS — I10 ESSENTIAL HYPERTENSION: ICD-10-CM

## 2019-09-10 DIAGNOSIS — R74.8 ELEVATED LIVER ENZYMES: ICD-10-CM

## 2019-09-10 DIAGNOSIS — G47.00 INSOMNIA, UNSPECIFIED TYPE: ICD-10-CM

## 2019-09-10 DIAGNOSIS — F43.10 PTSD (POST-TRAUMATIC STRESS DISORDER): ICD-10-CM

## 2019-09-10 DIAGNOSIS — I21.19 ACUTE ST ELEVATION MYOCARDIAL INFARCTION (STEMI) INVOLVING OTHER CORONARY ARTERY OF INFERIOR WALL (HCC): Primary | ICD-10-CM

## 2019-09-10 DIAGNOSIS — R61 HYPERHIDROSIS: ICD-10-CM

## 2019-09-10 DIAGNOSIS — Z98.84 S/P LAPAROSCOPIC SLEEVE GASTRECTOMY: ICD-10-CM

## 2019-09-10 DIAGNOSIS — I48.91 ATRIAL FIBRILLATION WITH RAPID VENTRICULAR RESPONSE (HCC): ICD-10-CM

## 2019-09-10 DIAGNOSIS — Z95.5 S/P DRUG ELUTING CORONARY STENT PLACEMENT: ICD-10-CM

## 2019-09-10 PROBLEM — I21.9 ACUTE MYOCARDIAL INFARCTION (HCC): Status: RESOLVED | Noted: 2019-08-25 | Resolved: 2019-09-10

## 2019-09-10 PROBLEM — I48.0 PAROXYSMAL A-FIB (HCC): Status: RESOLVED | Noted: 2019-09-04 | Resolved: 2019-09-10

## 2019-09-10 PROCEDURE — 1111F DSCHRG MED/CURRENT MED MERGE: CPT | Performed by: FAMILY MEDICINE

## 2019-09-10 PROCEDURE — 99215 OFFICE O/P EST HI 40 MIN: CPT | Performed by: FAMILY MEDICINE

## 2019-09-10 NOTE — PROGRESS NOTES
Patient presents with:  Hospital F/U     HPI:   Mena Hernandez is a 66year old male with PMH anxiety/PTSD, obesity s/p failed weight loss surgery who presents to the office for hospital follow up. Was in the hospital 8/25/19-8/30/19 for STEMI.   Was t Pulmonary hypertension (HCC)     Acute ST elevation myocardial infarction (STEMI) involving other coronary artery of inferior wall (HCC)     S/P drug eluting coronary stent placement     Coronary artery disease involving native coronary artery of native he contrast, dx/therapeutic substance, epidural/subarachnoid; lumbar/sacral  3/11/2014   • Knee surgery Left     ACL   • M-sedaj by  phys perfrmg sv 5+ yr  1/29/2014   • M-sedaj by  phys perfrmg svc 5+ yr  3/11/2014   • Needle biopsy liver     • Patient conjunctivae/corneas clear. PERRL, EOM's intact. Nose: Nares normal. Septum midline. Mucosa normal. No drainage or sinus tenderness.   Throat: lips, mucosa, and tongue normal; teeth and gums normal  Neck: no adenopathy, no JVD, supple, symmetrical, trache - 17.5 g/dL 18.2 (H)   Hematocrit      39.0 - 53.0 % 49.8   Platelet Count      455.7 - 450.0 10(3)uL 174.0   MCV      80.0 - 100.0 fL 102.0 (H)   MCH      26.0 - 34.0 pg 37.3 (H)   MCHC      31.0 - 37.0 g/dL 36.5   RDW      11.0 - 15.0 % 12.8   RDW-SD 1. 10 1.05   HEMOGLOBIN A1c      <5.7 % 5.4   ESTIMATED AVERAGE GLUCOSE      68 - 126 mg/dL 108   TROPONIN      <0.045 ng/mL <0.045   pro-BETA NATRIURETIC PEPTIDE      <450 pg/mL 83   APTT      25.4 - 36.1 seconds 24.1 (L)   DIRECT LDL      <100 mg/dL 110 (

## 2019-09-13 ENCOUNTER — TELEPHONE (OUTPATIENT)
Dept: CARDIOLOGY | Age: 74
End: 2019-09-13

## 2019-09-16 ENCOUNTER — HOSPITAL ENCOUNTER (OUTPATIENT)
Dept: CV DIAGNOSTICS | Facility: HOSPITAL | Age: 74
Discharge: HOME | End: 2019-09-16
Attending: CLINICAL NURSE SPECIALIST
Payer: MEDICARE

## 2019-09-16 ENCOUNTER — CLINICAL ABSTRACT (OUTPATIENT)
Dept: CARDIOLOGY | Age: 74
End: 2019-09-16

## 2019-09-16 ENCOUNTER — LABORATORY ENCOUNTER (OUTPATIENT)
Dept: LAB | Age: 74
End: 2019-09-16
Attending: FAMILY MEDICINE
Payer: MEDICARE

## 2019-09-16 DIAGNOSIS — D69.1 THROMBOCYTOPATHIA (HCC): ICD-10-CM

## 2019-09-16 DIAGNOSIS — K76.0 FATTY LIVER: ICD-10-CM

## 2019-09-16 DIAGNOSIS — F43.10 PTSD (POST-TRAUMATIC STRESS DISORDER): ICD-10-CM

## 2019-09-16 DIAGNOSIS — I21.19 ACUTE ST ELEVATION MYOCARDIAL INFARCTION (STEMI) INVOLVING OTHER CORONARY ARTERY OF INFERIOR WALL (HCC): ICD-10-CM

## 2019-09-16 DIAGNOSIS — E78.5 HYPERLIPEMIA: Primary | ICD-10-CM

## 2019-09-16 DIAGNOSIS — I10 ESSENTIAL HYPERTENSION: ICD-10-CM

## 2019-09-16 DIAGNOSIS — R74.8 ELEVATED LIVER ENZYMES: ICD-10-CM

## 2019-09-16 DIAGNOSIS — I21.11 ACUTE ST ELEVATION MYOCARDIAL INFARCTION (STEMI) INVOLVING RIGHT CORONARY ARTERY (HCC): ICD-10-CM

## 2019-09-16 DIAGNOSIS — E66.01 OBESITY, MORBID (HCC): ICD-10-CM

## 2019-09-16 LAB
ALBUMIN SERPL-MCNC: 4 G/DL
ALBUMIN SERPL-MCNC: 4 G/DL (ref 3.4–5)
ALBUMIN/GLOB SERPL: 1.1 {RATIO} (ref 1–2)
ALBUMIN/GLOB SERPL: NORMAL {RATIO}
ALP LIVER SERPL-CCNC: 83 U/L (ref 45–117)
ALP SERPL-CCNC: 83 U/L
ALT SERPL-CCNC: 158 U/L
ALT SERPL-CCNC: 158 U/L (ref 16–61)
ANION GAP SERPL CALC-SCNC: 9 MMOL/L (ref 0–18)
ANION GAP SERPL CALC-SCNC: NORMAL MMOL/L
AST SERPL-CCNC: 141 U/L
AST SERPL-CCNC: 141 U/L (ref 15–37)
BILIRUB SERPL-MCNC: 1.2 MG/DL
BILIRUB SERPL-MCNC: 1.2 MG/DL (ref 0.1–2)
BUN BLD-MCNC: 15 MG/DL (ref 7–18)
BUN SERPL-MCNC: 15 MG/DL
BUN/CREAT SERPL: 11.4 (ref 10–20)
BUN/CREAT SERPL: NORMAL
CALCIUM BLD-MCNC: 9.8 MG/DL (ref 8.5–10.1)
CALCIUM SERPL-MCNC: 9.8 MG/DL
CHLORIDE SERPL-SCNC: 105 MMOL/L
CHLORIDE SERPL-SCNC: 105 MMOL/L (ref 98–112)
CO2 SERPL-SCNC: 24 MMOL/L (ref 21–32)
CO2 SERPL-SCNC: NORMAL MMOL/L
CREAT BLD-MCNC: 1.32 MG/DL (ref 0.7–1.3)
CREAT SERPL-MCNC: 1.32 MG/DL
GLOBULIN PLAS-MCNC: 3.6 G/DL (ref 2.8–4.4)
GLOBULIN SER-MCNC: 3.6 G/DL
GLUCOSE BLD-MCNC: 118 MG/DL (ref 70–99)
GLUCOSE SERPL-MCNC: 118 MG/DL
LENGTH OF FAST TIME PATIENT: NORMAL H
M PROTEIN MFR SERPL ELPH: 7.6 G/DL (ref 6.4–8.2)
OSMOLALITY SERPL CALC.SUM OF ELEC: 288 MOSM/KG (ref 275–295)
POTASSIUM SERPL-SCNC: 3.5 MMOL/L
POTASSIUM SERPL-SCNC: 3.5 MMOL/L (ref 3.5–5.1)
PROT SERPL-MCNC: 7.6 G/DL
SODIUM SERPL-SCNC: 138 MMOL/L
SODIUM SERPL-SCNC: 138 MMOL/L (ref 136–145)

## 2019-09-16 PROCEDURE — 93018 CV STRESS TEST I&R ONLY: CPT | Performed by: CLINICAL NURSE SPECIALIST

## 2019-09-16 PROCEDURE — 93017 CV STRESS TEST TRACING ONLY: CPT | Performed by: CLINICAL NURSE SPECIALIST

## 2019-09-16 PROCEDURE — 80053 COMPREHEN METABOLIC PANEL: CPT

## 2019-09-19 ENCOUNTER — CARDPULM VISIT (OUTPATIENT)
Dept: CARDIAC REHAB | Facility: HOSPITAL | Age: 74
End: 2019-09-19
Attending: INTERNAL MEDICINE
Payer: MEDICARE

## 2019-09-19 VITALS
DIASTOLIC BLOOD PRESSURE: 64 MMHG | OXYGEN SATURATION: 97 % | BODY MASS INDEX: 36.22 KG/M2 | SYSTOLIC BLOOD PRESSURE: 120 MMHG | HEIGHT: 70 IN | WEIGHT: 253 LBS | HEART RATE: 60 BPM

## 2019-09-19 PROCEDURE — 93798 PHYS/QHP OP CAR RHAB W/ECG: CPT

## 2019-09-23 ENCOUNTER — APPOINTMENT (OUTPATIENT)
Dept: CARDIAC REHAB | Facility: HOSPITAL | Age: 74
End: 2019-09-23
Attending: INTERNAL MEDICINE
Payer: MEDICARE

## 2019-09-25 ENCOUNTER — CARDPULM VISIT (OUTPATIENT)
Dept: CARDIAC REHAB | Facility: HOSPITAL | Age: 74
End: 2019-09-25
Attending: INTERNAL MEDICINE
Payer: MEDICARE

## 2019-09-25 PROCEDURE — 93798 PHYS/QHP OP CAR RHAB W/ECG: CPT

## 2019-10-02 ENCOUNTER — APPOINTMENT (OUTPATIENT)
Dept: CARDIAC REHAB | Facility: HOSPITAL | Age: 74
End: 2019-10-02
Attending: INTERNAL MEDICINE
Payer: MEDICARE

## 2019-10-02 ENCOUNTER — APPOINTMENT (OUTPATIENT)
Dept: CARDIOLOGY | Age: 74
End: 2019-10-02

## 2019-10-04 ENCOUNTER — APPOINTMENT (OUTPATIENT)
Dept: CARDIAC REHAB | Facility: HOSPITAL | Age: 74
End: 2019-10-04
Attending: INTERNAL MEDICINE
Payer: MEDICARE

## 2019-10-07 ENCOUNTER — APPOINTMENT (OUTPATIENT)
Dept: CARDIAC REHAB | Facility: HOSPITAL | Age: 74
End: 2019-10-07
Attending: INTERNAL MEDICINE
Payer: MEDICARE

## 2019-10-09 ENCOUNTER — APPOINTMENT (OUTPATIENT)
Dept: CARDIAC REHAB | Facility: HOSPITAL | Age: 74
End: 2019-10-09
Attending: INTERNAL MEDICINE
Payer: MEDICARE

## 2019-10-23 DIAGNOSIS — F43.10 PTSD (POST-TRAUMATIC STRESS DISORDER): ICD-10-CM

## 2019-10-23 DIAGNOSIS — G47.01 INSOMNIA DUE TO MEDICAL CONDITION: ICD-10-CM

## 2019-10-24 RX ORDER — ALPRAZOLAM 1 MG/1
TABLET ORAL
Qty: 60 TABLET | Refills: 1 | Status: SHIPPED | OUTPATIENT
Start: 2019-10-24 | End: 2019-12-27

## 2019-10-24 RX ORDER — LISINOPRIL 20 MG/1
20 TABLET ORAL DAILY
Qty: 30 TABLET | Refills: 3 | Status: SHIPPED | OUTPATIENT
Start: 2019-10-24 | End: 2020-02-19 | Stop reason: SDUPTHER

## 2019-10-25 ENCOUNTER — APPOINTMENT (OUTPATIENT)
Dept: CARDIAC REHAB | Facility: HOSPITAL | Age: 74
End: 2019-10-25
Attending: INTERNAL MEDICINE
Payer: MEDICARE

## 2019-10-25 ENCOUNTER — OFFICE VISIT (OUTPATIENT)
Dept: CARDIOLOGY | Age: 74
End: 2019-10-25

## 2019-10-25 VITALS
HEART RATE: 60 BPM | BODY MASS INDEX: 35.93 KG/M2 | WEIGHT: 251 LBS | SYSTOLIC BLOOD PRESSURE: 110 MMHG | HEIGHT: 70 IN | DIASTOLIC BLOOD PRESSURE: 60 MMHG

## 2019-10-25 DIAGNOSIS — I48.0 PAF (PAROXYSMAL ATRIAL FIBRILLATION) (CMD): ICD-10-CM

## 2019-10-25 DIAGNOSIS — I25.10 CORONARY ARTERY DISEASE INVOLVING NATIVE CORONARY ARTERY OF NATIVE HEART WITHOUT ANGINA PECTORIS: Primary | ICD-10-CM

## 2019-10-25 PROCEDURE — 93000 ELECTROCARDIOGRAM COMPLETE: CPT | Performed by: INTERNAL MEDICINE

## 2019-10-25 PROCEDURE — 3078F DIAST BP <80 MM HG: CPT | Performed by: INTERNAL MEDICINE

## 2019-10-25 PROCEDURE — 99215 OFFICE O/P EST HI 40 MIN: CPT | Performed by: INTERNAL MEDICINE

## 2019-10-25 PROCEDURE — 3074F SYST BP LT 130 MM HG: CPT | Performed by: INTERNAL MEDICINE

## 2019-10-25 RX ORDER — GLYCOPYRROLATE 1 MG/1
1 TABLET ORAL 2 TIMES DAILY
COMMUNITY
End: 2020-02-01 | Stop reason: ALTCHOICE

## 2019-10-25 RX ORDER — ALPRAZOLAM 1 MG/1
1 TABLET ORAL PRN
COMMUNITY

## 2019-10-25 ASSESSMENT — PATIENT HEALTH QUESTIONNAIRE - PHQ9
1. LITTLE INTEREST OR PLEASURE IN DOING THINGS: NOT AT ALL
SUM OF ALL RESPONSES TO PHQ9 QUESTIONS 1 AND 2: 0
SUM OF ALL RESPONSES TO PHQ9 QUESTIONS 1 AND 2: 0
2. FEELING DOWN, DEPRESSED OR HOPELESS: NOT AT ALL

## 2019-10-25 ASSESSMENT — ENCOUNTER SYMPTOMS
WEIGHT LOSS: 0
HEMOPTYSIS: 0
HEMATOCHEZIA: 0
SUSPICIOUS LESIONS: 0
WEIGHT GAIN: 0
COUGH: 0
BRUISES/BLEEDS EASILY: 0
CHILLS: 0
FEVER: 0
ALLERGIC/IMMUNOLOGIC COMMENTS: NO NEW FOOD ALLERGIES

## 2019-11-06 ENCOUNTER — APPOINTMENT (OUTPATIENT)
Dept: CARDIAC REHAB | Facility: HOSPITAL | Age: 74
End: 2019-11-06
Attending: INTERNAL MEDICINE
Payer: MEDICARE

## 2019-11-08 ENCOUNTER — APPOINTMENT (OUTPATIENT)
Dept: CARDIAC REHAB | Facility: HOSPITAL | Age: 74
End: 2019-11-08
Attending: INTERNAL MEDICINE
Payer: MEDICARE

## 2019-11-11 ENCOUNTER — APPOINTMENT (OUTPATIENT)
Dept: CARDIAC REHAB | Facility: HOSPITAL | Age: 74
End: 2019-11-11
Attending: INTERNAL MEDICINE
Payer: MEDICARE

## 2019-11-11 ENCOUNTER — TELEPHONE (OUTPATIENT)
Dept: CARDIOLOGY | Age: 74
End: 2019-11-11

## 2019-11-23 DIAGNOSIS — F43.10 PTSD (POST-TRAUMATIC STRESS DISORDER): ICD-10-CM

## 2019-11-23 DIAGNOSIS — G47.00 INSOMNIA, UNSPECIFIED TYPE: ICD-10-CM

## 2019-11-26 RX ORDER — DIAZEPAM 5 MG/1
TABLET ORAL
Qty: 30 TABLET | Refills: 5 | Status: SHIPPED | OUTPATIENT
Start: 2019-11-26 | End: 2020-07-13

## 2019-11-27 ENCOUNTER — APPOINTMENT (OUTPATIENT)
Dept: CARDIAC REHAB | Facility: HOSPITAL | Age: 74
End: 2019-11-27
Attending: INTERNAL MEDICINE
Payer: MEDICARE

## 2019-11-27 RX ORDER — SOTALOL HYDROCHLORIDE 80 MG/1
80 TABLET ORAL DAILY
Qty: 90 TABLET | Refills: 1 | Status: SHIPPED | OUTPATIENT
Start: 2019-11-27 | End: 2020-05-18

## 2019-11-29 ENCOUNTER — APPOINTMENT (OUTPATIENT)
Dept: CARDIAC REHAB | Facility: HOSPITAL | Age: 74
End: 2019-11-29
Attending: INTERNAL MEDICINE
Payer: MEDICARE

## 2019-12-04 ENCOUNTER — TELEPHONE (OUTPATIENT)
Dept: CARDIOLOGY | Age: 74
End: 2019-12-04

## 2019-12-06 ENCOUNTER — APPOINTMENT (OUTPATIENT)
Dept: CARDIAC REHAB | Facility: HOSPITAL | Age: 74
End: 2019-12-06
Attending: INTERNAL MEDICINE
Payer: MEDICARE

## 2019-12-09 ENCOUNTER — APPOINTMENT (OUTPATIENT)
Dept: CARDIAC REHAB | Facility: HOSPITAL | Age: 74
End: 2019-12-09
Attending: INTERNAL MEDICINE
Payer: MEDICARE

## 2019-12-11 ENCOUNTER — APPOINTMENT (OUTPATIENT)
Dept: CARDIAC REHAB | Facility: HOSPITAL | Age: 74
End: 2019-12-11
Attending: INTERNAL MEDICINE
Payer: MEDICARE

## 2019-12-27 DIAGNOSIS — G47.01 INSOMNIA DUE TO MEDICAL CONDITION: ICD-10-CM

## 2019-12-27 DIAGNOSIS — F43.10 PTSD (POST-TRAUMATIC STRESS DISORDER): ICD-10-CM

## 2019-12-27 RX ORDER — ALPRAZOLAM 1 MG/1
TABLET ORAL
Qty: 60 TABLET | Refills: 0 | Status: SHIPPED | OUTPATIENT
Start: 2019-12-27 | End: 2020-02-13

## 2019-12-27 NOTE — TELEPHONE ENCOUNTER
Pt lov 9/10/19, no upcoming visit scheduled. Is it ok to refill alprazolam? Please advise.  Routed to Dr Bernardo Charles

## 2020-02-03 ENCOUNTER — TELEPHONE (OUTPATIENT)
Dept: CARDIOLOGY | Age: 75
End: 2020-02-03

## 2020-02-07 ENCOUNTER — TELEPHONE (OUTPATIENT)
Dept: CARDIOLOGY | Age: 75
End: 2020-02-07

## 2020-02-11 ENCOUNTER — TELEPHONE (OUTPATIENT)
Dept: FAMILY MEDICINE CLINIC | Facility: CLINIC | Age: 75
End: 2020-02-11

## 2020-02-11 ENCOUNTER — APPOINTMENT (OUTPATIENT)
Dept: CARDIOLOGY | Age: 75
End: 2020-02-11

## 2020-02-12 ENCOUNTER — LAB ENCOUNTER (OUTPATIENT)
Dept: LAB | Facility: HOSPITAL | Age: 75
End: 2020-02-12
Attending: NURSE PRACTITIONER
Payer: MEDICARE

## 2020-02-12 DIAGNOSIS — R74.8 ACID PHOSPHATASE ELEVATED: ICD-10-CM

## 2020-02-12 DIAGNOSIS — E78.2 MIXED HYPERLIPIDEMIA: Primary | ICD-10-CM

## 2020-02-12 LAB
ALT SERPL-CCNC: 77 U/L
ALT SERPL-CCNC: 77 U/L (ref 16–61)
AST SERPL-CCNC: 76 U/L
AST SERPL-CCNC: 76 U/L (ref 15–37)
CHOLEST SERPL-MCNC: 163 MG/DL
CHOLEST SMN-MCNC: 163 MG/DL (ref ?–200)
CHOLEST/HDLC SERPL: NORMAL {RATIO}
HDLC SERPL-MCNC: 36 MG/DL
HDLC SERPL-MCNC: 36 MG/DL (ref 40–59)
LDLC SERPL CALC-MCNC: NORMAL MG/DL
LDLC SERPL DIRECT ASSAY-MCNC: 70 MG/DL (ref ?–100)
LENGTH OF FAST TIME PATIENT: NORMAL H
NONHDLC SERPL-MCNC: 127 MG/DL
NONHDLC SERPL-MCNC: 127 MG/DL (ref ?–130)
PATIENT FASTING Y/N/NP: YES
TRIGL SERPL-MCNC: 557 MG/DL
TRIGL SERPL-MCNC: 557 MG/DL (ref 30–149)
VLDLC SERPL CALC-MCNC: NORMAL MG/DL

## 2020-02-12 PROCEDURE — 84450 TRANSFERASE (AST) (SGOT): CPT

## 2020-02-12 PROCEDURE — 80061 LIPID PANEL: CPT

## 2020-02-12 PROCEDURE — 84460 ALANINE AMINO (ALT) (SGPT): CPT

## 2020-02-12 PROCEDURE — 83721 ASSAY OF BLOOD LIPOPROTEIN: CPT

## 2020-02-12 PROCEDURE — 36415 COLL VENOUS BLD VENIPUNCTURE: CPT

## 2020-02-13 ENCOUNTER — OFFICE VISIT (OUTPATIENT)
Dept: FAMILY MEDICINE CLINIC | Facility: CLINIC | Age: 75
End: 2020-02-13
Payer: MEDICARE

## 2020-02-13 ENCOUNTER — CLINICAL ABSTRACT (OUTPATIENT)
Dept: CARDIOLOGY | Age: 75
End: 2020-02-13

## 2020-02-13 VITALS
RESPIRATION RATE: 18 BRPM | HEIGHT: 69.5 IN | BODY MASS INDEX: 36.77 KG/M2 | WEIGHT: 254 LBS | DIASTOLIC BLOOD PRESSURE: 68 MMHG | HEART RATE: 61 BPM | TEMPERATURE: 98 F | SYSTOLIC BLOOD PRESSURE: 110 MMHG

## 2020-02-13 DIAGNOSIS — R73.03 PREDIABETES: ICD-10-CM

## 2020-02-13 DIAGNOSIS — I25.118 CORONARY ARTERY DISEASE OF NATIVE ARTERY OF NATIVE HEART WITH STABLE ANGINA PECTORIS (HCC): ICD-10-CM

## 2020-02-13 DIAGNOSIS — I70.0 THORACIC AORTA ATHEROSCLEROSIS (HCC): ICD-10-CM

## 2020-02-13 DIAGNOSIS — K92.1 BLOOD IN STOOL: ICD-10-CM

## 2020-02-13 DIAGNOSIS — Z95.5 S/P DRUG ELUTING CORONARY STENT PLACEMENT: ICD-10-CM

## 2020-02-13 DIAGNOSIS — F43.10 PTSD (POST-TRAUMATIC STRESS DISORDER): ICD-10-CM

## 2020-02-13 DIAGNOSIS — D69.6 THROMBOCYTOPENIA (HCC): Chronic | ICD-10-CM

## 2020-02-13 DIAGNOSIS — E66.01 SEVERE OBESITY (BMI 35.0-39.9) WITH COMORBIDITY (HCC): ICD-10-CM

## 2020-02-13 DIAGNOSIS — R53.82 CHRONIC FATIGUE SYNDROME: ICD-10-CM

## 2020-02-13 DIAGNOSIS — K76.0 FATTY LIVER: ICD-10-CM

## 2020-02-13 DIAGNOSIS — Z23 NEEDS FLU SHOT: ICD-10-CM

## 2020-02-13 DIAGNOSIS — M06.9 RHEUMATOID ARTHRITIS, INVOLVING UNSPECIFIED SITE, UNSPECIFIED RHEUMATOID FACTOR PRESENCE: ICD-10-CM

## 2020-02-13 DIAGNOSIS — Z98.84 S/P LAPAROSCOPIC SLEEVE GASTRECTOMY: ICD-10-CM

## 2020-02-13 DIAGNOSIS — R76.8 POSITIVE ANA (ANTINUCLEAR ANTIBODY): ICD-10-CM

## 2020-02-13 DIAGNOSIS — I27.20 PULMONARY HYPERTENSION (HCC): ICD-10-CM

## 2020-02-13 DIAGNOSIS — N18.30 CKD (CHRONIC KIDNEY DISEASE) STAGE 3, GFR 30-59 ML/MIN (HCC): ICD-10-CM

## 2020-02-13 DIAGNOSIS — I10 ESSENTIAL HYPERTENSION: ICD-10-CM

## 2020-02-13 DIAGNOSIS — Z98.1 S/P LUMBAR FUSION: ICD-10-CM

## 2020-02-13 DIAGNOSIS — G47.01 INSOMNIA DUE TO MEDICAL CONDITION: ICD-10-CM

## 2020-02-13 DIAGNOSIS — K58.2 IRRITABLE BOWEL SYNDROME WITH BOTH CONSTIPATION AND DIARRHEA: ICD-10-CM

## 2020-02-13 DIAGNOSIS — R05.9 COUGH: ICD-10-CM

## 2020-02-13 DIAGNOSIS — M48.061 LUMBAR FORAMINAL STENOSIS: ICD-10-CM

## 2020-02-13 DIAGNOSIS — F32.5 MAJOR DEPRESSION IN REMISSION (HCC): ICD-10-CM

## 2020-02-13 DIAGNOSIS — R74.8 ELEVATED LIVER ENZYMES: ICD-10-CM

## 2020-02-13 DIAGNOSIS — E78.2 HYPERCHOLESTEROLEMIA WITH HYPERTRIGLYCERIDEMIA: ICD-10-CM

## 2020-02-13 DIAGNOSIS — R61 HYPERHIDROSIS: ICD-10-CM

## 2020-02-13 DIAGNOSIS — I48.91 ATRIAL FIBRILLATION WITH RAPID VENTRICULAR RESPONSE (HCC): ICD-10-CM

## 2020-02-13 DIAGNOSIS — Z00.00 ENCOUNTER FOR ANNUAL HEALTH EXAMINATION: Primary | ICD-10-CM

## 2020-02-13 DIAGNOSIS — I77.811 ECTATIC ABDOMINAL AORTA (HCC): ICD-10-CM

## 2020-02-13 PROBLEM — I21.19 ACUTE ST ELEVATION MYOCARDIAL INFARCTION (STEMI) INVOLVING OTHER CORONARY ARTERY OF INFERIOR WALL (HCC): Status: RESOLVED | Noted: 2019-08-25 | Resolved: 2020-02-13

## 2020-02-13 PROCEDURE — G0008 ADMIN INFLUENZA VIRUS VAC: HCPCS | Performed by: FAMILY MEDICINE

## 2020-02-13 PROCEDURE — 96160 PT-FOCUSED HLTH RISK ASSMT: CPT | Performed by: FAMILY MEDICINE

## 2020-02-13 PROCEDURE — 90662 IIV NO PRSV INCREASED AG IM: CPT | Performed by: FAMILY MEDICINE

## 2020-02-13 PROCEDURE — G0439 PPPS, SUBSEQ VISIT: HCPCS | Performed by: FAMILY MEDICINE

## 2020-02-13 PROCEDURE — 99397 PER PM REEVAL EST PAT 65+ YR: CPT | Performed by: FAMILY MEDICINE

## 2020-02-13 RX ORDER — BENZONATATE 200 MG/1
200 CAPSULE ORAL NIGHTLY PRN
Qty: 30 CAPSULE | Refills: 5 | Status: SHIPPED | OUTPATIENT
Start: 2020-02-13 | End: 2020-07-29 | Stop reason: ALTCHOICE

## 2020-02-13 RX ORDER — ALPRAZOLAM 1 MG/1
1 TABLET ORAL 2 TIMES DAILY PRN
Qty: 60 TABLET | Refills: 5 | Status: SHIPPED | OUTPATIENT
Start: 2020-02-13 | End: 2020-09-01

## 2020-02-13 RX ORDER — GLYCOPYRROLATE 1 MG/1
1 TABLET ORAL 2 TIMES DAILY
Qty: 60 TABLET | Refills: 2 | Status: SHIPPED | OUTPATIENT
Start: 2020-02-13 | End: 2020-09-01

## 2020-02-13 NOTE — PROGRESS NOTES
HPI:   Yodit Sawant is a 66year old male who presents for a MA (Medicare Advantage) [address] (Once per calendar year). Preventative Screening  Colonoscopy - now age 66.   No indication for c-scope screening especially after MI  Prostate - no h/o did not work. Cough - continued night time cough, gargling listerine helps some. Congestion persists. Feels like he is an echo chamber.      Annual Physical due on 03/12/2020        Fall/Risk Assessment   He has been screened for Falls and is low ri - or that you are a failure or have let yourself or your family down: Nearly every day  7. Trouble concentrating on things, such as reading the newspaper or watching television: Not at all  8.  Moving or speaking so slowly that other people could have notic as Consulting Physician (Abbey Beltran)  Seda Romero MD as Consulting Physician (OPHTHALMOLOGY)  PREETI Gonzalez (Family Practice)  Luis Miranda RD (Dietitian)  Eloy Pitt MD as Consulting Physician (Maria Luz Tyson)  Chetna Kim MD as Rosalina Rangel Value Date    AST 76 (H) 02/12/2020    ALT 77 (H) 02/12/2020    CA 9.8 09/16/2019    ALB 4.0 09/16/2019    TSH 3.760 (H) 08/28/2019    CREATSERUM 1.32 (H) 09/16/2019     (H) 09/16/2019        CBC  (most recent labs)   Lab Results   Component Value D w/wo contrast, dx/therapeutic substance, epidural/subarachnoid; lumbar/sacral (1/29/2014); m-sedaj by  phys perfrmg svc 5+ yr (1/29/2014); fluor gid & loclzj ndl/cath spi dx/ther njx (1/29/2014); patient withough preoperative order for iv antibiotic surg (36.6 °C) (Oral)   Resp 18   Ht 69.5\"   Wt 254 lb (115.2 kg)   BMI 36.97 kg/m²   Estimated body mass index is 36.97 kg/m² as calculated from the following:    Height as of this encounter: 69.5\". Weight as of this encounter: 254 lb (115.2 kg).     Medic who presents for a Medicare Assessment. PLAN SUMMARY:     Gopi Maguires was seen in the office today:  had concerns including Physical (Super visit ) and Heart Problem (heart attack last year ).     1. Encounter for annual health examination  Overa not as effective. Large uncomfortable BMs, bleeding  Wishes to see GI.   - GASTRO - INTERNAL    15. Severe obesity (BMI 35.0-39. 9) with comorbidity ([address]  S/p weight loss surgery - no weight loss from this. Frustrated. Small food portions.      16. S/P months, have you lost more than 10 pounds without trying?: 2 - No  Has your appetite been poor?: No  How does the patient maintain a good energy level?: Other(dont do anything due to d=fatigue)  How would you describe your daily physical activity?: None  H (Prevnar)  Covered Once after 65 12/20/2016 Please get once after your 65th birthday    Pneumococcal 23 (Pneumovax)  Covered Once after 65 12/16/2016 Please get once after your 65th birthday    Hepatitis B for Moderate/High Risk No vaccine history found Me

## 2020-02-13 NOTE — PATIENT INSTRUCTIONS
Zehra Meza's SCREENING SCHEDULE   Tests on this list are recommended by your physician but may not be covered, or covered at this frequency, by your insurer. Please check with your insurance carrier before scheduling to verify coverage.     PREVEN than 100 cigarettes in their lifetime   • Anyone with a family history    Colorectal Cancer Screening Covered up to Age 76     Colonoscopy Screen   Covered every 10 years- more often if abnormal There are no preventive care reminders to display for this pa Homosexual men   Illicit injectable drug abusers     Tetanus Toxoid- Only covered with a cut with metal- TD and TDaP Not covered by Medicare Part B) No orders found for this or any previous visit.  This may be covered with your prescription benefits, but

## 2020-02-14 ENCOUNTER — OFFICE VISIT (OUTPATIENT)
Dept: CARDIOLOGY | Age: 75
End: 2020-02-14

## 2020-02-14 VITALS
WEIGHT: 254 LBS | HEIGHT: 70 IN | DIASTOLIC BLOOD PRESSURE: 78 MMHG | HEART RATE: 62 BPM | BODY MASS INDEX: 36.36 KG/M2 | SYSTOLIC BLOOD PRESSURE: 120 MMHG

## 2020-02-14 DIAGNOSIS — Z95.5 PRESENCE OF STENT IN RIGHT CORONARY ARTERY: ICD-10-CM

## 2020-02-14 DIAGNOSIS — I10 ESSENTIAL HYPERTENSION: ICD-10-CM

## 2020-02-14 DIAGNOSIS — I25.10 CORONARY ARTERY DISEASE INVOLVING NATIVE CORONARY ARTERY OF NATIVE HEART WITHOUT ANGINA PECTORIS: Primary | ICD-10-CM

## 2020-02-14 PROCEDURE — 3078F DIAST BP <80 MM HG: CPT | Performed by: NURSE PRACTITIONER

## 2020-02-14 PROCEDURE — 99213 OFFICE O/P EST LOW 20 MIN: CPT | Performed by: NURSE PRACTITIONER

## 2020-02-14 PROCEDURE — 3074F SYST BP LT 130 MM HG: CPT | Performed by: NURSE PRACTITIONER

## 2020-02-14 SDOH — HEALTH STABILITY: MENTAL HEALTH: HOW MANY STANDARD DRINKS CONTAINING ALCOHOL DO YOU HAVE ON A TYPICAL DAY?: 1 OR 2

## 2020-02-14 SDOH — HEALTH STABILITY: MENTAL HEALTH: HOW OFTEN DO YOU HAVE A DRINK CONTAINING ALCOHOL?: 2-4 TIMES A MONTH

## 2020-02-14 SDOH — HEALTH STABILITY: PHYSICAL HEALTH: ON AVERAGE, HOW MANY MINUTES DO YOU ENGAGE IN EXERCISE AT THIS LEVEL?: 0 MIN

## 2020-02-14 SDOH — HEALTH STABILITY: PHYSICAL HEALTH: ON AVERAGE, HOW MANY DAYS PER WEEK DO YOU ENGAGE IN MODERATE TO STRENUOUS EXERCISE (LIKE A BRISK WALK)?: 0 DAYS

## 2020-02-14 ASSESSMENT — PATIENT HEALTH QUESTIONNAIRE - PHQ9
2. FEELING DOWN, DEPRESSED OR HOPELESS: NEARLY EVERY DAY
1. LITTLE INTEREST OR PLEASURE IN DOING THINGS: NEARLY EVERY DAY
SUM OF ALL RESPONSES TO PHQ9 QUESTIONS 1 AND 2: 6
SUM OF ALL RESPONSES TO PHQ9 QUESTIONS 1 AND 2: 6

## 2020-02-14 ASSESSMENT — ENCOUNTER SYMPTOMS
FEVER: 0
DIAPHORESIS: 1
HEMATOCHEZIA: 0
SUSPICIOUS LESIONS: 0
COUGH: 0
CHILLS: 0
WEIGHT LOSS: 0
WEIGHT GAIN: 0
BRUISES/BLEEDS EASILY: 0
DIZZINESS: 1
ALLERGIC/IMMUNOLOGIC COMMENTS: NO NEW FOOD ALLERGIES
HEMOPTYSIS: 0

## 2020-02-19 ENCOUNTER — TELEPHONE (OUTPATIENT)
Dept: CARDIOLOGY | Age: 75
End: 2020-02-19

## 2020-02-19 RX ORDER — LISINOPRIL 20 MG/1
20 TABLET ORAL DAILY
Qty: 30 TABLET | Refills: 3 | Status: CANCELLED | OUTPATIENT
Start: 2020-02-19

## 2020-02-19 RX ORDER — LISINOPRIL 20 MG/1
20 TABLET ORAL DAILY
Qty: 90 TABLET | Refills: 1 | Status: SHIPPED | OUTPATIENT
Start: 2020-02-19 | End: 2020-07-13

## 2020-02-27 RX ORDER — ROSUVASTATIN CALCIUM 20 MG/1
TABLET, COATED ORAL
Qty: 90 TABLET | Refills: 3 | Status: SHIPPED | OUTPATIENT
Start: 2020-02-27 | End: 2021-02-11 | Stop reason: SDUPTHER

## 2020-04-24 ENCOUNTER — OFFICE VISIT (OUTPATIENT)
Dept: CARDIOLOGY | Age: 75
End: 2020-04-24

## 2020-04-24 DIAGNOSIS — I25.10 CORONARY ARTERY DISEASE INVOLVING NATIVE CORONARY ARTERY OF NATIVE HEART WITHOUT ANGINA PECTORIS: ICD-10-CM

## 2020-04-24 DIAGNOSIS — I48.0 PAROXYSMAL ATRIAL FIBRILLATION (CMD): Primary | ICD-10-CM

## 2020-04-24 PROCEDURE — 99441 TELEPHONE E&M BY PHYSICIAN EST PT NOT ORIG PREV 7 DAYS 5-10 MIN: CPT | Performed by: INTERNAL MEDICINE

## 2020-05-18 RX ORDER — SOTALOL HYDROCHLORIDE 80 MG/1
80 TABLET ORAL DAILY
Qty: 90 TABLET | Refills: 1 | Status: SHIPPED | OUTPATIENT
Start: 2020-05-18 | End: 2020-11-11 | Stop reason: SDUPTHER

## 2020-06-04 ENCOUNTER — TELEPHONE (OUTPATIENT)
Dept: SURGERY | Facility: CLINIC | Age: 75
End: 2020-06-04

## 2020-06-10 ENCOUNTER — TELEPHONE (OUTPATIENT)
Dept: FAMILY MEDICINE CLINIC | Facility: CLINIC | Age: 75
End: 2020-06-10

## 2020-06-10 ENCOUNTER — APPOINTMENT (OUTPATIENT)
Dept: CARDIOLOGY | Age: 75
End: 2020-06-10

## 2020-06-10 NOTE — TELEPHONE ENCOUNTER
Called and talked to patient and told himhe does not need testing if he has a fever he will call back

## 2020-06-10 NOTE — TELEPHONE ENCOUNTER
Patient called states was told by by lab to contact PCP regarding his symptoms, he's had a minor fever on and off with a slight cough. Please advise

## 2020-06-11 ENCOUNTER — LABORATORY ENCOUNTER (OUTPATIENT)
Dept: LAB | Age: 75
End: 2020-06-11
Attending: STUDENT IN AN ORGANIZED HEALTH CARE EDUCATION/TRAINING PROGRAM
Payer: MEDICARE

## 2020-06-11 DIAGNOSIS — K58.1 IRRITABLE BOWEL SYNDROME WITH CONSTIPATION: ICD-10-CM

## 2020-06-11 PROCEDURE — 86140 C-REACTIVE PROTEIN: CPT

## 2020-06-11 PROCEDURE — 36415 COLL VENOUS BLD VENIPUNCTURE: CPT

## 2020-06-11 PROCEDURE — 84443 ASSAY THYROID STIM HORMONE: CPT

## 2020-06-11 PROCEDURE — 85025 COMPLETE CBC W/AUTO DIFF WBC: CPT

## 2020-06-11 PROCEDURE — 80053 COMPREHEN METABOLIC PANEL: CPT

## 2020-06-15 ENCOUNTER — TELEPHONE (OUTPATIENT)
Dept: CARDIOLOGY | Age: 75
End: 2020-06-15

## 2020-06-15 DIAGNOSIS — I21.11 ACUTE ST ELEVATION MYOCARDIAL INFARCTION (STEMI) INVOLVING RIGHT CORONARY ARTERY (CMD): Primary | ICD-10-CM

## 2020-06-16 ENCOUNTER — OFFICE VISIT (OUTPATIENT)
Dept: CARDIOLOGY | Age: 75
End: 2020-06-16

## 2020-06-16 ENCOUNTER — TELEPHONE (OUTPATIENT)
Dept: FAMILY MEDICINE CLINIC | Facility: CLINIC | Age: 75
End: 2020-06-16

## 2020-06-16 VITALS
DIASTOLIC BLOOD PRESSURE: 60 MMHG | HEIGHT: 70 IN | BODY MASS INDEX: 36.79 KG/M2 | SYSTOLIC BLOOD PRESSURE: 100 MMHG | WEIGHT: 257 LBS | HEART RATE: 60 BPM

## 2020-06-16 DIAGNOSIS — E03.9 HYPOTHYROIDISM, ADULT: ICD-10-CM

## 2020-06-16 DIAGNOSIS — E66.01 OBESITY, MORBID (CMD): ICD-10-CM

## 2020-06-16 DIAGNOSIS — I10 ESSENTIAL HYPERTENSION: ICD-10-CM

## 2020-06-16 DIAGNOSIS — I25.10 CVD (CARDIOVASCULAR DISEASE): Primary | ICD-10-CM

## 2020-06-16 DIAGNOSIS — I25.2 MI, OLD: Chronic | ICD-10-CM

## 2020-06-16 DIAGNOSIS — Z95.5 STATUS POST INSERTION OF DRUG-ELUTING STENT INTO RIGHT CORONARY ARTERY FOR CORONARY ARTERY DISEASE: Chronic | ICD-10-CM

## 2020-06-16 DIAGNOSIS — I25.10 CORONARY ARTERY DISEASE INVOLVING NATIVE CORONARY ARTERY OF NATIVE HEART WITHOUT ANGINA PECTORIS: Primary | Chronic | ICD-10-CM

## 2020-06-16 DIAGNOSIS — Z86.79 ATRIAL FIBRILLATION, CURRENTLY IN SINUS RHYTHM: ICD-10-CM

## 2020-06-16 DIAGNOSIS — E78.2 HYPERLIPIDEMIA, MIXED: ICD-10-CM

## 2020-06-16 DIAGNOSIS — I21.11 ST ELEVATION MYOCARDIAL INFARCTION INVOLVING RIGHT CORONARY ARTERY (HCC): ICD-10-CM

## 2020-06-16 DIAGNOSIS — E11.69 TYPE 2 DIABETES MELLITUS WITH OTHER SPECIFIED COMPLICATION, WITHOUT LONG-TERM CURRENT USE OF INSULIN (CMD): ICD-10-CM

## 2020-06-16 PROBLEM — I21.9 ACUTE MYOCARDIAL INFARCTION (CMD): Status: RESOLVED | Noted: 2019-08-25 | Resolved: 2020-06-16

## 2020-06-16 PROCEDURE — 3078F DIAST BP <80 MM HG: CPT | Performed by: INTERNAL MEDICINE

## 2020-06-16 PROCEDURE — 99215 OFFICE O/P EST HI 40 MIN: CPT | Performed by: INTERNAL MEDICINE

## 2020-06-16 PROCEDURE — 93000 ELECTROCARDIOGRAM COMPLETE: CPT | Performed by: INTERNAL MEDICINE

## 2020-06-16 PROCEDURE — 3074F SYST BP LT 130 MM HG: CPT | Performed by: INTERNAL MEDICINE

## 2020-06-16 RX ORDER — PRASUGREL 10 MG/1
TABLET, FILM COATED ORAL
Qty: 30 TABLET | Refills: 5 | Status: SHIPPED | OUTPATIENT
Start: 2020-06-16 | End: 2021-01-15 | Stop reason: SDUPTHER

## 2020-06-16 RX ORDER — PRASUGREL 10 MG/1
TABLET, FILM COATED ORAL
COMMUNITY
End: 2020-06-16 | Stop reason: SDUPTHER

## 2020-06-16 ASSESSMENT — ENCOUNTER SYMPTOMS
HEMATOCHEZIA: 0
FEVER: 0
HEMOPTYSIS: 0
WEIGHT GAIN: 0
BRUISES/BLEEDS EASILY: 0
COUGH: 0
CHILLS: 0
WEIGHT LOSS: 0
SUSPICIOUS LESIONS: 0
ALLERGIC/IMMUNOLOGIC COMMENTS: NO NEW FOOD ALLERGIES

## 2020-06-16 NOTE — TELEPHONE ENCOUNTER
Referral request Dr. Jaleel Krishnan M.D.    [address] [facility]    CPT [address]    5 visits    DX I 25.10       I 21.11    Authorization number:  [id number] valid 6/16/2020 to 12/31/2020

## 2020-06-19 ENCOUNTER — HOSPITAL ENCOUNTER (OUTPATIENT)
Dept: CV DIAGNOSTICS | Facility: HOSPITAL | Age: 75
Discharge: HOME | End: 2020-06-19
Attending: INTERNAL MEDICINE
Payer: MEDICARE

## 2020-06-19 DIAGNOSIS — I25.10 CORONARY ATHEROSCLEROSIS OF NATIVE CORONARY ARTERY: ICD-10-CM

## 2020-06-19 PROCEDURE — 93226 XTRNL ECG REC<48 HR SCAN A/R: CPT | Performed by: INTERNAL MEDICINE

## 2020-06-19 PROCEDURE — 93225 XTRNL ECG REC<48 HRS REC: CPT | Performed by: INTERNAL MEDICINE

## 2020-06-23 ENCOUNTER — TELEPHONE (OUTPATIENT)
Dept: CARDIOLOGY | Age: 75
End: 2020-06-23

## 2020-07-02 NOTE — PROGRESS NOTES
24 hour holter monitor recording was poor due to channel loss, only 8-10 hours of data. Pt was notified and suggested to repeat the holter monitor. Pt advised to call  And inform of this information.   As of yet 07/02/2020 patient has not called to juana

## 2020-07-13 ENCOUNTER — TELEPHONE (OUTPATIENT)
Dept: RHEUMATOLOGY | Facility: CLINIC | Age: 75
End: 2020-07-13

## 2020-07-13 DIAGNOSIS — G47.00 INSOMNIA, UNSPECIFIED TYPE: ICD-10-CM

## 2020-07-13 DIAGNOSIS — F43.10 PTSD (POST-TRAUMATIC STRESS DISORDER): ICD-10-CM

## 2020-07-13 RX ORDER — LISINOPRIL 20 MG/1
20 TABLET ORAL DAILY
Qty: 90 TABLET | Refills: 1 | Status: SHIPPED | OUTPATIENT
Start: 2020-07-13 | End: 2020-11-11 | Stop reason: SDUPTHER

## 2020-07-13 RX ORDER — DIAZEPAM 5 MG/1
TABLET ORAL
Qty: 30 TABLET | Refills: 0 | Status: SHIPPED | OUTPATIENT
Start: 2020-07-13 | End: 2020-09-01

## 2020-07-13 NOTE — TELEPHONE ENCOUNTER
Refill request for:    Requested Prescriptions     Pending Prescriptions Disp Refills   • DIAZEPAM 5 MG Oral Tab [Pharmacy Med Name: DIAZEPAM 5MG TABLETS] 30 tablet 0     Sig: TAKE 1 TABLET BY MOUTH EVERY NIGHT AS NEEDED FOR SLEEP        Last Prescribed Rizwana Davis

## 2020-07-13 NOTE — TELEPHONE ENCOUNTER
Phoned pt, left voice message for pt to call our office for more details regarding his phone call earlier today

## 2020-07-13 NOTE — TELEPHONE ENCOUNTER
Patient wanted the doctor to call him concerning a future appointment.  He wanted to speak with the doctor only

## 2020-07-14 NOTE — TELEPHONE ENCOUNTER
Phoned pt, wanted to confirm scheduled appt.    Future Appointments   Date Time Provider Doroteo Hernandez   7/29/2020  3:15 PM Aram Gaxiola [facility] Jari Cooks

## 2020-07-28 ENCOUNTER — TELEPHONE (OUTPATIENT)
Dept: RHEUMATOLOGY | Facility: CLINIC | Age: 75
End: 2020-07-28

## 2020-07-28 NOTE — TELEPHONE ENCOUNTER
Called patient several times to get a ref and remind him of his  Appt, called the phone number in his chart and his wife's number, pt still has not yet called back

## 2020-07-29 ENCOUNTER — TELEPHONE (OUTPATIENT)
Dept: RHEUMATOLOGY | Facility: CLINIC | Age: 75
End: 2020-07-29

## 2020-07-29 ENCOUNTER — TELEPHONE (OUTPATIENT)
Dept: FAMILY MEDICINE CLINIC | Facility: CLINIC | Age: 75
End: 2020-07-29

## 2020-07-29 ENCOUNTER — OFFICE VISIT (OUTPATIENT)
Dept: RHEUMATOLOGY | Facility: CLINIC | Age: 75
End: 2020-07-29
Payer: MEDICARE

## 2020-07-29 VITALS
WEIGHT: 261.63 LBS | DIASTOLIC BLOOD PRESSURE: 60 MMHG | TEMPERATURE: 97 F | HEIGHT: 70 IN | SYSTOLIC BLOOD PRESSURE: 124 MMHG | HEART RATE: 60 BPM | RESPIRATION RATE: 20 BRPM | BODY MASS INDEX: 37.45 KG/M2

## 2020-07-29 DIAGNOSIS — R21 SKIN RASH: ICD-10-CM

## 2020-07-29 DIAGNOSIS — R76.8 SS-A ANTIBODY POSITIVE: ICD-10-CM

## 2020-07-29 DIAGNOSIS — R91.8 ABNORMAL CT SCAN OF LUNG: ICD-10-CM

## 2020-07-29 DIAGNOSIS — R06.02 SHORTNESS OF BREATH: ICD-10-CM

## 2020-07-29 DIAGNOSIS — R76.8 POSITIVE ANA (ANTINUCLEAR ANTIBODY): Primary | ICD-10-CM

## 2020-07-29 DIAGNOSIS — R76.8 FALSE POSITIVE SEROLOGICAL TEST FOR SYPHILIS: Primary | ICD-10-CM

## 2020-07-29 DIAGNOSIS — R05.3 CHRONIC COUGH: ICD-10-CM

## 2020-07-29 PROCEDURE — 3074F SYST BP LT 130 MM HG: CPT | Performed by: INTERNAL MEDICINE

## 2020-07-29 PROCEDURE — 3078F DIAST BP <80 MM HG: CPT | Performed by: INTERNAL MEDICINE

## 2020-07-29 PROCEDURE — 99204 OFFICE O/P NEW MOD 45 MIN: CPT | Performed by: INTERNAL MEDICINE

## 2020-07-29 PROCEDURE — 3008F BODY MASS INDEX DOCD: CPT | Performed by: INTERNAL MEDICINE

## 2020-07-29 NOTE — PATIENT INSTRUCTIONS
-- get updated labs to evaluate the SHAHLA you had that tested positive in 2018  -- for the cough/shortness of breath, I am ordering a high resolution CT of the lungs, wait to schedule until we clear with your insurance  -- you may want to consider re-evaluat

## 2020-07-29 NOTE — TELEPHONE ENCOUNTER
PA for CT chest done through [facility]. Approved. No approval code could be given at this time. Information to be sent via fax.  Ref #[id number]

## 2020-07-29 NOTE — PROGRESS NOTES
?  RHEUMATOLOGY NEW PATIENT   Date of visit: 7/29/2020  ? Patient presents with:  Establish Care: New pt. Dr. Rudolph Camarillo referral. Chronic cough, chest congestion, throat congestion. Blood work done 6 weeks ago. Fatigue and shortness of breath.       ?  ASSE face with patient, >50% of that time spent discussing potential etiology to symptoms, treatment options and coordinating care.        Plan:  Diagnoses and all orders for this visit:    Positive SHAHLA (antinuclear antibody)  -     SHAHLA BY IFA, IGG; Future  - significant fatigue  Suffers from chronic intermittent gastritis with constipation and diarrhea, is waiting to be seen by GI. Has been having bloody bowel movement with significant anal pain during movement. Has been worse over the past few years.  Does try Past Medical History:  Past Medical History:   Diagnosis Date   • Anesthesia complication     twilight does not work- needs total anesthesia   • Anxiety state     hx PTSD   • Atherosclerosis of coronary artery    • Cataract     cataract surgery   • C related   • Heart Disorder Brother         bypass surgery   • Hypertension Brother    • Lipids Brother    • Arthritis Brother      Social History:  Social History    Tobacco Use      Smoking status: Former Smoker      Smokeless tobacco: Never Used    Alcoh swelling. Gastrointestinal: Negative for abdominal pain, heartburn and nausea. Genitourinary: Negative for frequency and urgency. Musculoskeletal: Positive for back pain and neck pain. Negative for joint pain. Skin: Negative for itching and rash. moderate crepitus, no effusion. Lymphadenopathy:     He has no cervical adenopathy. Neurological: He is alert and oriented to person, place, and time. No cranial nerve deficit. Skin: Skin is warm and dry. He is not diaphoretic.    Maculopapular change radiograph was obtained. COMPARISON:  [address], XR RIBS WITH CHEST (3 VIEWS), LEFT  (CPT=71101), 12/14/2018, 15:01.      INDICATIONS:  chest pain     PATIENT STATED HISTORY: (As transcribed by Technologist)  Patient offered no additional history at t lobe nodule on image #24. 1 mm pulmonary nodule within the left upper lobe on image 40. 1 mm left upper lobe nodule on image #45. 1 mm   nodule within the left upper lobe on image #39. 1 mm nodule within the right lower lobe on image #81.  1-2 mm nodule wit obstruction from prostatomegaly but correlation with urinalysis is recommended to exclude cystitis. #3. Hepatic steatosis. #4. Small pulmonary nodules as described above. Consider CT chest in one year to document stability.       Dictated by: Cordell Jensen

## 2020-08-06 ENCOUNTER — TELEPHONE (OUTPATIENT)
Dept: RHEUMATOLOGY | Facility: CLINIC | Age: 75
End: 2020-08-06

## 2020-08-06 ENCOUNTER — OFFICE VISIT (OUTPATIENT)
Dept: SURGERY | Facility: CLINIC | Age: 75
End: 2020-08-06
Payer: MEDICARE

## 2020-08-06 VITALS
HEART RATE: 64 BPM | RESPIRATION RATE: 20 BRPM | SYSTOLIC BLOOD PRESSURE: 105 MMHG | TEMPERATURE: 96 F | WEIGHT: 261 LBS | DIASTOLIC BLOOD PRESSURE: 68 MMHG | BODY MASS INDEX: 37 KG/M2

## 2020-08-06 DIAGNOSIS — K59.01 SLOW TRANSIT CONSTIPATION: ICD-10-CM

## 2020-08-06 DIAGNOSIS — E66.01 SEVERE OBESITY (BMI 35.0-35.9 WITH COMORBIDITY) (HCC): ICD-10-CM

## 2020-08-06 DIAGNOSIS — I25.119 CORONARY ARTERY DISEASE INVOLVING NATIVE CORONARY ARTERY OF NATIVE HEART WITH ANGINA PECTORIS (HCC): ICD-10-CM

## 2020-08-06 DIAGNOSIS — K58.1 IRRITABLE BOWEL SYNDROME WITH CONSTIPATION: ICD-10-CM

## 2020-08-06 DIAGNOSIS — K60.0 ACUTE POSTERIOR ANAL FISSURE: Primary | ICD-10-CM

## 2020-08-06 DIAGNOSIS — Z01.818 PRE-OP TESTING: ICD-10-CM

## 2020-08-06 DIAGNOSIS — Z98.84 S/P LAPAROSCOPIC SLEEVE GASTRECTOMY: ICD-10-CM

## 2020-08-06 PROCEDURE — 3078F DIAST BP <80 MM HG: CPT | Performed by: COLON & RECTAL SURGERY

## 2020-08-06 PROCEDURE — 3074F SYST BP LT 130 MM HG: CPT | Performed by: COLON & RECTAL SURGERY

## 2020-08-06 PROCEDURE — 99204 OFFICE O/P NEW MOD 45 MIN: CPT | Performed by: COLON & RECTAL SURGERY

## 2020-08-06 NOTE — H&P
New Patient Visit Note       Active Problems      1. Acute posterior anal fissure    2. Slow transit constipation    3. Irritable bowel syndrome with constipation    4.  Coronary artery disease involving native coronary artery of native heart with angina pe history of colon cancer or uterine cancer. The patient has a past surgical history significant for a laparoscopic gastric sleeve procedure. The patient also had a MI with a stent placed in August 2019.   He currently sees Dr. Shedrick Libman as his cardiologist [facility])    • Night sweats    • Obesity    • Problems with swallowing     congestion in throat, ears.  (has seen ENT, no dx)   • Shortness of breath     increased over past two years   • Visual impairment     glasses   • Weight gain      Past Surgical History: Frequency: 2-3 times a week        Drinks per session: 1 or 2        Binge frequency: Never      Drug use: No    Other Topics      Concerns:        Caffeine Concern: Yes          weekly        Exercise: No        Seat Belt: Yes       Current Outpatient Med abdominal pain, anal bleeding, blood in stool, constipation, diarrhea, nausea and vomiting. Genitourinary: Negative for difficulty urinating, dysuria, frequency and urgency. Musculoskeletal: Negative for arthralgias and myalgias.    Skin: Negative for c Prostate Nodule  Anal Sphincter Intact  No Pruritis Ani  No Lichenification  No Abscess  No Fistula in ano  No Anterior Fissure  No Posterior Fissure  No Pilonidal Cyst    See Procedures:  Anoscopy    Clinical examination of the rectum including anoscopy r will have diarrhea when he finally does have a bowel movement. He states he takes MiraLAX at times when he is feeling constipated. He will then have diarrhea after taking the MiraLAX. He states this has been a cycle over the last 4 to 5 years.     The pa daily. It has to be the Metamucil powder. He should continue to take this up until his next visit to help regulate his alternating constipation and diarrhea.     I will see the patient again in 1 month for continued care and evaluation of his anal fissure

## 2020-08-06 NOTE — PATIENT INSTRUCTIONS
The patient presents today in consultation of Dr. Joylene Merlin for rectal bleeding and constipation. The patient states that for many years he has had issues with constipation as well as blood with bowel movements.   The patient states that for about 5 or 6 y hemorrhoids, fistula, or abscesses. Digital rectal exam is exquisitely tender in the posterior position. It was discussed with the patient that the pain that he is experiencing is secondary to this anal fissure.   It was discussed that this is a cut in

## 2020-08-11 ENCOUNTER — HOSPITAL ENCOUNTER (OUTPATIENT)
Dept: CT IMAGING | Facility: HOSPITAL | Age: 75
Discharge: HOME | End: 2020-08-11
Attending: INTERNAL MEDICINE
Payer: MEDICARE

## 2020-08-11 ENCOUNTER — APPOINTMENT (OUTPATIENT)
Dept: LAB | Facility: HOSPITAL | Age: 75
End: 2020-08-11
Attending: INTERNAL MEDICINE
Payer: MEDICARE

## 2020-08-11 DIAGNOSIS — R76.8 POSITIVE ANA (ANTINUCLEAR ANTIBODY): ICD-10-CM

## 2020-08-11 DIAGNOSIS — R05.3 CHRONIC COUGH: ICD-10-CM

## 2020-08-11 DIAGNOSIS — R76.8 SS-A ANTIBODY POSITIVE: ICD-10-CM

## 2020-08-11 DIAGNOSIS — R06.02 SHORTNESS OF BREATH: ICD-10-CM

## 2020-08-11 DIAGNOSIS — R91.8 ABNORMAL CT SCAN OF LUNG: ICD-10-CM

## 2020-08-11 DIAGNOSIS — R21 SKIN RASH: ICD-10-CM

## 2020-08-11 LAB
C3 SERPL-MCNC: 158 MG/DL (ref 90–180)
C4 SERPL-MCNC: 30.6 MG/DL (ref 10–40)
CRP SERPL-MCNC: <0.29 MG/DL (ref ?–0.3)
IGA SERPL-MCNC: 203 MG/DL (ref 70–312)
IGM SERPL-MCNC: 96.7 MG/DL (ref 43–279)
IMMUNOGLOBULIN PNL SER-MCNC: 936 MG/DL (ref 791–1643)
SED RATE-ML: 11 MM/HR (ref 0–12)

## 2020-08-11 PROCEDURE — 86140 C-REACTIVE PROTEIN: CPT

## 2020-08-11 PROCEDURE — 36415 COLL VENOUS BLD VENIPUNCTURE: CPT

## 2020-08-11 PROCEDURE — 83876 ASSAY MYELOPEROXIDASE: CPT

## 2020-08-11 PROCEDURE — 86160 COMPLEMENT ANTIGEN: CPT

## 2020-08-11 PROCEDURE — 83516 IMMUNOASSAY NONANTIBODY: CPT

## 2020-08-11 PROCEDURE — 86038 ANTINUCLEAR ANTIBODIES: CPT

## 2020-08-11 PROCEDURE — 86235 NUCLEAR ANTIGEN ANTIBODY: CPT

## 2020-08-11 PROCEDURE — 85652 RBC SED RATE AUTOMATED: CPT

## 2020-08-11 PROCEDURE — 71250 CT THORAX DX C-: CPT | Performed by: INTERNAL MEDICINE

## 2020-08-11 PROCEDURE — 82784 ASSAY IGA/IGD/IGG/IGM EACH: CPT

## 2020-08-11 PROCEDURE — 86255 FLUORESCENT ANTIBODY SCREEN: CPT

## 2020-08-12 ENCOUNTER — TELEPHONE (OUTPATIENT)
Dept: FAMILY MEDICINE CLINIC | Facility: CLINIC | Age: 75
End: 2020-08-12

## 2020-08-12 DIAGNOSIS — J98.11 ATELECTASIS: Primary | ICD-10-CM

## 2020-08-12 LAB — SSA AUTOAB: <100 AU/ML (ref ?–100)

## 2020-08-12 NOTE — TELEPHONE ENCOUNTER
Spoke to pt who was recommended by the specialist, Dr. Arminda West to see a pulmonologist.  Pt is requesting a referral.

## 2020-08-13 NOTE — TELEPHONE ENCOUNTER
Patient given contact information for Dr. Lety Mercer M.D., Pulmonology.    He is also notified of approved referral.

## 2020-08-13 NOTE — TELEPHONE ENCOUNTER
Referral request Dr. Lety Mercer M.D.,Pulmonology    Patient had recent CT of chest showed dependent atelectasis per Dr. Messi Poon, DO should follow-up with pulmonary    4 visits     Auth per [facility] [id number] valid 8/13 to 12/11/2020

## 2020-08-14 ENCOUNTER — TELEPHONE (OUTPATIENT)
Dept: FAMILY MEDICINE CLINIC | Facility: CLINIC | Age: 75
End: 2020-08-14

## 2020-08-14 LAB
MYELOPEROX ANTIBODIES, IGG: 1 AU/ML
SERINE PROTEASE3, IGG: 7 AU/ML

## 2020-08-14 NOTE — TELEPHONE ENCOUNTER
[place] [employer] needs to be completed for patient and is due to the post office by 5 pm today.  Form given to Dr. Gordo Ernst to complete

## 2020-08-17 ENCOUNTER — TELEPHONE (OUTPATIENT)
Dept: RHEUMATOLOGY | Facility: CLINIC | Age: 75
End: 2020-08-17

## 2020-08-17 NOTE — TELEPHONE ENCOUNTER
Called pt and went to voicemail. I explained that you will call back around 2, maybe a little later.

## 2020-08-17 NOTE — TELEPHONE ENCOUNTER
LVM on both numbers provided for pt to call back to discuss results. Natalie Pittman, DO  [facility]  8/17/2020      Patient returned my call. Discussed test results in detail  Labs are grossly negative for autoimmune etiology of symptoms.    High r

## 2020-08-19 ENCOUNTER — MED REC SCAN ONLY (OUTPATIENT)
Dept: SURGERY | Facility: CLINIC | Age: 75
End: 2020-08-19

## 2020-08-21 ENCOUNTER — TELEPHONE (OUTPATIENT)
Dept: SURGERY | Facility: CLINIC | Age: 75
End: 2020-08-21

## 2020-08-21 NOTE — TELEPHONE ENCOUNTER
Pt called stating he never received nitroglycerin from [facility] appt on 8/6/2020, nothing ever sent to [facility] or scanned into [employer]. Pt notified that Carmel Marx is out of town. Reached out to [facility] to fill script.      Faxed order to [facility] health, received confirmatio

## 2020-08-26 ENCOUNTER — TELEPHONE (OUTPATIENT)
Dept: FAMILY MEDICINE CLINIC | Facility: CLINIC | Age: 75
End: 2020-08-26

## 2020-08-26 NOTE — TELEPHONE ENCOUNTER
Unable to leave a message -- just wanted to inform pt that his apptment scheduled w/Dr. Homa Gonzalez on Monday, 8/31 @ 3 pm has been rescheduled to Tuesday, 9/1 @ 4 pm.

## 2020-08-27 NOTE — TELEPHONE ENCOUNTER
LM informing pt that his appt with Dr. Shantanu Higgins on Monday, 8/31 @ 3 has been changed to Tuesday, 9/1 @ 4.

## 2020-09-01 ENCOUNTER — OFFICE VISIT (OUTPATIENT)
Dept: FAMILY MEDICINE CLINIC | Facility: CLINIC | Age: 75
End: 2020-09-01
Payer: MEDICARE

## 2020-09-01 VITALS
WEIGHT: 262.81 LBS | BODY MASS INDEX: 38.93 KG/M2 | HEART RATE: 76 BPM | HEIGHT: 69 IN | TEMPERATURE: 98 F | RESPIRATION RATE: 20 BRPM | SYSTOLIC BLOOD PRESSURE: 112 MMHG | DIASTOLIC BLOOD PRESSURE: 70 MMHG
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
## 2020-09-01 DIAGNOSIS — R26.81 GAIT INSTABILITY: ICD-10-CM
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
## 2020-09-01 DIAGNOSIS — F32.5 MAJOR DEPRESSION IN REMISSION (HCC): ICD-10-CM

## 2020-09-01 DIAGNOSIS — F43.10 PTSD (POST-TRAUMATIC STRESS DISORDER): ICD-10-CM

## 2020-09-01 DIAGNOSIS — M62.81 MUSCLE WEAKNESS: ICD-10-CM

## 2020-09-01 DIAGNOSIS — M06.9 RHEUMATOID ARTHRITIS, INVOLVING UNSPECIFIED SITE, UNSPECIFIED RHEUMATOID FACTOR PRESENCE: ICD-10-CM

## 2020-09-01 DIAGNOSIS — G47.01 INSOMNIA DUE TO MEDICAL CONDITION: ICD-10-CM

## 2020-09-01 DIAGNOSIS — G47.00 INSOMNIA, UNSPECIFIED TYPE: Primary | ICD-10-CM

## 2020-09-01 PROBLEM — E03.9 HYPOTHYROIDISM, ADULT: Status: ACTIVE | Noted: 2020-06-16

## 2020-09-01 PROCEDURE — 3008F BODY MASS INDEX DOCD: CPT | Performed by: FAMILY MEDICINE

## 2020-09-01 PROCEDURE — 3078F DIAST BP <80 MM HG: CPT | Performed by: FAMILY MEDICINE

## 2020-09-01 PROCEDURE — 99214 OFFICE O/P EST MOD 30 MIN: CPT | Performed by: FAMILY MEDICINE

## 2020-09-01 PROCEDURE — 3074F SYST BP LT 130 MM HG: CPT | Performed by: FAMILY MEDICINE

## 2020-09-01 RX ORDER — ALPRAZOLAM 1 MG/1
1 TABLET ORAL 2 TIMES DAILY PRN
Qty: 60 TABLET | Refills: 5 | Status: SHIPPED | OUTPATIENT
Start: 2020-09-01 | End: 2021-04-23

## 2020-09-01 RX ORDER — DIAZEPAM 5 MG/1
5 TABLET ORAL NIGHTLY PRN
Qty: 30 TABLET | Refills: 5 | Status: SHIPPED | OUTPATIENT
Start: 2020-09-01 | End: 2021-04-22

## 2020-09-01 RX ORDER — PRASUGREL 10 MG/1
10 TABLET, FILM COATED ORAL DAILY
Status: ON HOLD | COMMUNITY
Start: 2020-08-10 | End: 2021-03-10

## 2020-09-01 NOTE — PROGRESS NOTES
Patient presents with:  Fatigue: Fatigue. Pt c/o not being able to get out of bed     HPI:   Nicanor Naik is a 66year old male who presents to the office for discussion of anxiety/PTSD, and fatigue. The fatigue has been worse of late.   Lays in bed SHAHLA BY IFA, IGG    Collection Time: 08/11/20  1:57 PM   Result Value Ref Range    SHAHLA Interpretive Comment See Note     Anti-Nuclear Antibody (SHAHLA), HEp-2  IgG <1:80 <1:80   SJOGREN'S ANTI-SS-A    Collection Time: 08/11/20  1:57 PM   Result Value Ref Ran Sleep or Anxiety. Dispense: 60 tablet; Refill: 5    3. Insomnia due to medical condition  Med refilled  - ALPRAZolam 1 MG Oral Tab; Take 1 tablet (1 mg total) by mouth 2 (two) times daily as needed for Sleep or Anxiety. Dispense: 60 tablet;  Refill: 5

## 2020-09-04 NOTE — TELEPHONE ENCOUNTER
Homa Maravilla from [place] [address] is calling with a need for a new order listing PTSD as the DX. She said she needs a reason for the overall dx.

## 2020-09-04 NOTE — TELEPHONE ENCOUNTER
Called [facility] and Donya Cabello requests PTSD be added as dx to referral. Updated referral placed.

## 2020-09-14 ENCOUNTER — TELEPHONE (OUTPATIENT)
Dept: FAMILY MEDICINE CLINIC | Facility: CLINIC | Age: 75
End: 2020-09-14

## 2020-09-14 NOTE — TELEPHONE ENCOUNTER
Pt wants to start home care on Wednesday 9/16/20, [facility] because it was supposed to start today.

## 2020-09-17 ENCOUNTER — TELEPHONE (OUTPATIENT)
Dept: FAMILY MEDICINE CLINIC | Facility: CLINIC | Age: 75
End: 2020-09-17

## 2020-09-17 ENCOUNTER — OFFICE VISIT (OUTPATIENT)
Dept: SURGERY | Facility: CLINIC | Age: 75
End: 2020-09-17
Payer: MEDICARE

## 2020-09-17 VITALS — SYSTOLIC BLOOD PRESSURE: 115 MMHG | TEMPERATURE: 96 F | DIASTOLIC BLOOD PRESSURE: 78 MMHG | HEART RATE: 62 BPM

## 2020-09-17 DIAGNOSIS — Z01.818 PRE-OP TESTING: ICD-10-CM

## 2020-09-17 DIAGNOSIS — K59.01 SLOW TRANSIT CONSTIPATION: ICD-10-CM

## 2020-09-17 DIAGNOSIS — K60.0 ACUTE POSTERIOR ANAL FISSURE: Primary | ICD-10-CM

## 2020-09-17 PROCEDURE — 99214 OFFICE O/P EST MOD 30 MIN: CPT | Performed by: COLON & RECTAL SURGERY

## 2020-09-17 PROCEDURE — 3074F SYST BP LT 130 MM HG: CPT | Performed by: COLON & RECTAL SURGERY

## 2020-09-17 PROCEDURE — 3078F DIAST BP <80 MM HG: CPT | Performed by: COLON & RECTAL SURGERY

## 2020-09-17 RX ORDER — POLYETHYLENE GLYCOL 3350, SODIUM CHLORIDE, SODIUM BICARBONATE, POTASSIUM CHLORIDE 420; 11.2; 5.72; 1.48 G/4L; G/4L; G/4L; G/4L
POWDER, FOR SOLUTION ORAL
Qty: 1 BOTTLE | Refills: 0 | Status: SHIPPED | OUTPATIENT
Start: 2020-09-17 | End: 2021-02-02 | Stop reason: ALTCHOICE

## 2020-09-17 NOTE — PROGRESS NOTES
Follow Up Visit Note       Active Problems      1. Acute posterior anal fissure    2. Slow transit constipation    3.  Pre-op testing          Chief Complaint   Patient presents with:  Fissure: 1 month for continued care and evaluation of anal fissure and a hearing loss   • Heart attack (HCC)    • High blood pressure    • High cholesterol    • Hyperlipidemia    • IBS (irritable bowel syndrome)     irregular bowel patterns (takes miralax)   • Irregular bowel habits    • Morbid obesity with BMI of 40.0-44.9, ad Occupation: Retired    Tobacco Use      Smoking status: Former Smoker      Smokeless tobacco: Never Used    Substance and Sexual Activity      Alcohol use:  Yes        Alcohol/week: 1.0 standard drinks        Types: 1 Standard drinks or equivalent per week Negative for chest pain, palpitations and leg swelling. Gastrointestinal: Positive for anal bleeding and rectal pain. Negative for abdominal distention, abdominal pain, blood in stool, constipation, diarrhea, nausea and vomiting.    Genitourinary: Miltona Penaloza the prescription based on confusion with the company and the patient himself. He is in severe pain with every bowel movement. This patient has had back injury which impairs his walking.     Clinical exam limited to external exam and digital rectal exa Testing      Imaging & Referrals   None    Follow Up  Return in 8 days (on 9/25/2020).     Gonzalo Lee MD

## 2020-09-17 NOTE — TELEPHONE ENCOUNTER
Franchesca Vazquez started Home health today with pt and he wants to make sure that Dr. Matti Esteban will sign off on the orders: Once a week every other week for a total of 4 visits. He thinks he would benefit with some counseling as well, he seems depressed.  Just hi

## 2020-09-17 NOTE — TELEPHONE ENCOUNTER
Called Raya Knutson and told him Dr Natalie Hensley OK with signing off on orders and they will try and get  in to see him

## 2020-09-18 NOTE — PATIENT INSTRUCTIONS
This patient presents for further care and treatment of a very severely painful anal fissure in the posterior midline. He also has alternating changes in bowel habits with diarrhea and constipation.     At his last office visit we asked him to take 1 hea obtain his medication for his upcoming colonoscopy that will be performed on September 25, 2020. All risks, benefits, complications and alternatives to the proposed operation were fully discussed with the patient.  All questions from the patient were ans

## 2020-09-22 ENCOUNTER — APPOINTMENT (OUTPATIENT)
Dept: LAB | Facility: HOSPITAL | Age: 75
End: 2020-09-22
Attending: COLON & RECTAL SURGERY
Payer: MEDICARE

## 2020-09-22 DIAGNOSIS — Z01.818 PRE-OP TESTING: ICD-10-CM

## 2020-09-23 ENCOUNTER — TELEPHONE (OUTPATIENT)
Dept: CARDIOLOGY | Age: 75
End: 2020-09-23

## 2020-09-23 ENCOUNTER — TELEPHONE (OUTPATIENT)
Dept: SURGERY | Facility: CLINIC | Age: 75
End: 2020-09-23

## 2020-09-23 NOTE — TELEPHONE ENCOUNTER
[facility] called regarding this pt did not stop his prasugrel before his procedure scheduled 9/25/2020 with [facility] for cscope. Called to Dr Shannon Camacho office regarding cardiac clearance and anticoagulation management. Waiting for a call back regarding the pt.

## 2020-09-23 NOTE — TELEPHONE ENCOUNTER
PA paged back and said pt would need to RS. Called pt and notified him of needing to cancel his cscope on 9/25/2020 with [facility]. The pt needs cardiac clearance and anticoagulation management. The pt was confused about the reason for needing this.  The pt wa

## 2020-09-24 ENCOUNTER — TELEPHONE (OUTPATIENT)
Dept: FAMILY MEDICINE CLINIC | Facility: CLINIC | Age: 75
End: 2020-09-24

## 2020-09-24 ENCOUNTER — TELEPHONE (OUTPATIENT)
Dept: SURGERY | Facility: CLINIC | Age: 75
End: 2020-09-24

## 2020-09-24 DIAGNOSIS — Z01.818 PRE-OP TESTING: Primary | ICD-10-CM

## 2020-09-24 NOTE — TELEPHONE ENCOUNTER
Pt confused about if his colonoscopy is canceled - informed yes due to no cardiac clearance. Pt needs to make appt for clearance and then once this is done can schedule.

## 2020-09-25 ENCOUNTER — TELEPHONE (OUTPATIENT)
Dept: SURGERY | Facility: CLINIC | Age: 75
End: 2020-09-25

## 2020-09-25 ENCOUNTER — MED REC SCAN ONLY (OUTPATIENT)
Dept: SURGERY | Facility: CLINIC | Age: 75
End: 2020-09-25

## 2020-09-25 LAB — SARS-COV-2 RNA RESP QL NAA+PROBE: NOT DETECTED

## 2020-09-29 ENCOUNTER — TELEPHONE (OUTPATIENT)
Dept: CARDIOLOGY | Age: 75
End: 2020-09-29

## 2020-09-29 ENCOUNTER — APPOINTMENT (OUTPATIENT)
Dept: LAB | Facility: HOSPITAL | Age: 75
End: 2020-09-29
Attending: COLON & RECTAL SURGERY
Payer: MEDICARE

## 2020-09-29 ENCOUNTER — TELEPHONE (OUTPATIENT)
Dept: SURGERY | Facility: CLINIC | Age: 75
End: 2020-09-29

## 2020-09-29 DIAGNOSIS — Z01.818 PRE-OP TESTING: ICD-10-CM

## 2020-09-29 NOTE — TELEPHONE ENCOUNTER
Pt scheduled for colonoscopy on Friday. Question regarding his lisinopril and rosuvastin, if he should stop it prior  Pt advised to stay on these medications. Pt v/u.

## 2020-09-30 ENCOUNTER — TELEPHONE (OUTPATIENT)
Dept: FAMILY MEDICINE CLINIC | Facility: CLINIC | Age: 75
End: 2020-09-30

## 2020-09-30 NOTE — TELEPHONE ENCOUNTER
Harsha Durand Physical Therapist from [address] [place] us that pt is being discharged today as he has a Colon procedure this wk and if  can recommend [address] with Psych for his depression?

## 2020-09-30 NOTE — TELEPHONE ENCOUNTER
When he comes home from procedure needs home care with psych component residential does not have this

## 2020-10-08 ENCOUNTER — TELEPHONE (OUTPATIENT)
Dept: SURGERY | Facility: CLINIC | Age: 75
End: 2020-10-08

## 2020-10-08 NOTE — TELEPHONE ENCOUNTER
Pt had colonoscopy 10/2 and has not had a BM since then. He has tried ducolax, colace, mucilax, pepto bismol - no results. Denies abdominal pain, does have small amount of bloating.  Instructed to take MOM per bottle instructions and then 2nd dose tonight i

## 2020-10-22 ENCOUNTER — PATIENT OUTREACH (OUTPATIENT)
Dept: SURGERY | Facility: CLINIC | Age: 75
End: 2020-10-22

## 2020-11-11 RX ORDER — LISINOPRIL 20 MG/1
20 TABLET ORAL DAILY
Qty: 90 TABLET | Refills: 1 | Status: CANCELLED | OUTPATIENT
Start: 2020-11-11

## 2020-11-11 RX ORDER — PRASUGREL 10 MG/1
TABLET, FILM COATED ORAL
Qty: 30 TABLET | Refills: 5 | Status: CANCELLED | OUTPATIENT
Start: 2020-11-11

## 2020-11-12 RX ORDER — SOTALOL HYDROCHLORIDE 80 MG/1
80 TABLET ORAL DAILY
Qty: 90 TABLET | Refills: 0 | Status: SHIPPED | OUTPATIENT
Start: 2020-11-12 | End: 2021-01-15 | Stop reason: SDUPTHER

## 2020-11-12 RX ORDER — LISINOPRIL 20 MG/1
20 TABLET ORAL DAILY
Qty: 90 TABLET | Refills: 3 | Status: SHIPPED | OUTPATIENT
Start: 2020-11-12 | End: 2021-01-15 | Stop reason: SDUPTHER

## 2020-11-23 ENCOUNTER — TELEPHONE (OUTPATIENT)
Dept: CARDIOLOGY | Age: 75
End: 2020-11-23

## 2020-12-14 ENCOUNTER — TELEPHONE (OUTPATIENT)
Dept: CARDIOLOGY | Age: 75
End: 2020-12-14

## 2020-12-16 ENCOUNTER — APPOINTMENT (OUTPATIENT)
Dept: CARDIOLOGY | Age: 75
End: 2020-12-16

## 2021-01-01 ENCOUNTER — TELEPHONE (OUTPATIENT)
Dept: FAMILY MEDICINE CLINIC | Facility: CLINIC | Age: 76
End: 2021-01-01

## 2021-01-01 DIAGNOSIS — D69.6 THROMBOCYTOPENIA (HCC): ICD-10-CM

## 2021-01-01 DIAGNOSIS — Z12.5 SCREENING FOR MALIGNANT NEOPLASM OF PROSTATE: ICD-10-CM

## 2021-01-01 DIAGNOSIS — R73.03 PREDIABETES: ICD-10-CM

## 2021-01-01 DIAGNOSIS — N18.31 STAGE 3A CHRONIC KIDNEY DISEASE (HCC): ICD-10-CM

## 2021-01-01 DIAGNOSIS — I25.118 CORONARY ARTERY DISEASE OF NATIVE ARTERY OF NATIVE HEART WITH STABLE ANGINA PECTORIS (HCC): ICD-10-CM

## 2021-01-01 DIAGNOSIS — N40.1 ENLARGED PROSTATE WITH URINARY RETENTION: Primary | ICD-10-CM

## 2021-01-01 DIAGNOSIS — R33.8 ENLARGED PROSTATE WITH URINARY RETENTION: Primary | ICD-10-CM

## 2021-01-15 ENCOUNTER — TELEPHONE (OUTPATIENT)
Dept: CARDIOLOGY | Age: 76
End: 2021-01-15

## 2021-01-15 RX ORDER — SOTALOL HYDROCHLORIDE 80 MG/1
80 TABLET ORAL DAILY
Qty: 90 TABLET | Refills: 3 | Status: SHIPPED | OUTPATIENT
Start: 2021-01-15

## 2021-01-15 RX ORDER — LISINOPRIL 20 MG/1
20 TABLET ORAL DAILY
Qty: 90 TABLET | Refills: 3 | Status: CANCELLED | OUTPATIENT
Start: 2021-01-15

## 2021-01-15 RX ORDER — LISINOPRIL 20 MG/1
20 TABLET ORAL DAILY
Qty: 90 TABLET | Refills: 3 | Status: SHIPPED | OUTPATIENT
Start: 2021-01-15

## 2021-01-15 RX ORDER — PRASUGREL 10 MG/1
TABLET, FILM COATED ORAL
Qty: 90 TABLET | Refills: 3 | Status: SHIPPED | OUTPATIENT
Start: 2021-01-15 | End: 2021-01-22 | Stop reason: ALTCHOICE

## 2021-01-15 RX ORDER — PRASUGREL 10 MG/1
TABLET, FILM COATED ORAL
Qty: 30 TABLET | Refills: 5 | Status: CANCELLED | OUTPATIENT
Start: 2021-01-15

## 2021-01-22 ENCOUNTER — OFFICE VISIT (OUTPATIENT)
Dept: CARDIOLOGY | Age: 76
End: 2021-01-22

## 2021-01-22 VITALS
WEIGHT: 254 LBS | SYSTOLIC BLOOD PRESSURE: 118 MMHG | BODY MASS INDEX: 36.36 KG/M2 | DIASTOLIC BLOOD PRESSURE: 72 MMHG | HEIGHT: 70 IN | HEART RATE: 70 BPM

## 2021-01-22 DIAGNOSIS — I10 ESSENTIAL HYPERTENSION: ICD-10-CM

## 2021-01-22 DIAGNOSIS — R74.8 ELEVATED LIVER ENZYMES: ICD-10-CM

## 2021-01-22 DIAGNOSIS — I25.2 MI, OLD: Chronic | ICD-10-CM

## 2021-01-22 DIAGNOSIS — Z95.5 STATUS POST INSERTION OF DRUG-ELUTING STENT INTO RIGHT CORONARY ARTERY FOR CORONARY ARTERY DISEASE: Chronic | ICD-10-CM

## 2021-01-22 DIAGNOSIS — D69.6 THROMBOCYTOPENIA (CMD): ICD-10-CM

## 2021-01-22 DIAGNOSIS — I25.10 CORONARY ARTERY DISEASE INVOLVING NATIVE CORONARY ARTERY OF NATIVE HEART WITHOUT ANGINA PECTORIS: Primary | Chronic | ICD-10-CM

## 2021-01-22 DIAGNOSIS — E66.01 OBESITY, MORBID (CMD): ICD-10-CM

## 2021-01-22 DIAGNOSIS — E78.2 HYPERLIPIDEMIA, MIXED: ICD-10-CM

## 2021-01-22 PROCEDURE — 99214 OFFICE O/P EST MOD 30 MIN: CPT | Performed by: CLINICAL NURSE SPECIALIST

## 2021-01-22 PROCEDURE — 3078F DIAST BP <80 MM HG: CPT | Performed by: CLINICAL NURSE SPECIALIST

## 2021-01-22 PROCEDURE — 3074F SYST BP LT 130 MM HG: CPT | Performed by: CLINICAL NURSE SPECIALIST

## 2021-01-22 RX ORDER — ASPIRIN 81 MG/1
81 TABLET ORAL DAILY
Qty: 90 TABLET | Refills: 3 | Status: SHIPPED | OUTPATIENT
Start: 2021-01-22

## 2021-01-22 SDOH — HEALTH STABILITY: PHYSICAL HEALTH: ON AVERAGE, HOW MANY DAYS PER WEEK DO YOU ENGAGE IN MODERATE TO STRENUOUS EXERCISE (LIKE A BRISK WALK)?: 0 DAYS

## 2021-01-22 SDOH — HEALTH STABILITY: MENTAL HEALTH: HOW OFTEN DO YOU HAVE A DRINK CONTAINING ALCOHOL?: 2-4 TIMES A MONTH

## 2021-01-22 SDOH — HEALTH STABILITY: MENTAL HEALTH: HOW MANY STANDARD DRINKS CONTAINING ALCOHOL DO YOU HAVE ON A TYPICAL DAY?: 1 OR 2

## 2021-01-22 SDOH — HEALTH STABILITY: PHYSICAL HEALTH: ON AVERAGE, HOW MANY MINUTES DO YOU ENGAGE IN EXERCISE AT THIS LEVEL?: 0 MIN

## 2021-01-22 ASSESSMENT — ENCOUNTER SYMPTOMS
BRUISES/BLEEDS EASILY: 0
CHILLS: 0
WEIGHT LOSS: 0
HEMOPTYSIS: 0
CONSTIPATION: 1
WEIGHT GAIN: 0
HEMATOCHEZIA: 1
FEVER: 0
SHORTNESS OF BREATH: 1
SUSPICIOUS LESIONS: 0
COUGH: 0
ALLERGIC/IMMUNOLOGIC COMMENTS: NO NEW FOOD ALLERGIES

## 2021-01-22 ASSESSMENT — PATIENT HEALTH QUESTIONNAIRE - PHQ9
SUM OF ALL RESPONSES TO PHQ9 QUESTIONS 1 AND 2: 0
2. FEELING DOWN, DEPRESSED OR HOPELESS: NOT AT ALL
SUM OF ALL RESPONSES TO PHQ9 QUESTIONS 1 AND 2: 0
CLINICAL INTERPRETATION OF PHQ9 SCORE: NO FURTHER SCREENING NEEDED
CLINICAL INTERPRETATION OF PHQ2 SCORE: NO FURTHER SCREENING NEEDED
1. LITTLE INTEREST OR PLEASURE IN DOING THINGS: NOT AT ALL

## 2021-02-01 ENCOUNTER — HOSPITAL ENCOUNTER (EMERGENCY)
Facility: HOSPITAL | Age: 76
Discharge: HOME | End: 2021-02-01
Attending: EMERGENCY MEDICINE
Payer: MEDICARE

## 2021-02-01 ENCOUNTER — APPOINTMENT (OUTPATIENT)
Dept: GENERAL RADIOLOGY | Facility: HOSPITAL | Age: 76
End: 2021-02-01
Attending: EMERGENCY MEDICINE
Payer: MEDICARE

## 2021-02-01 ENCOUNTER — TELEPHONE (OUTPATIENT)
Dept: SURGERY | Facility: CLINIC | Age: 76
End: 2021-02-01

## 2021-02-01 VITALS
BODY MASS INDEX: 37.22 KG/M2 | WEIGHT: 260 LBS | SYSTOLIC BLOOD PRESSURE: 131 MMHG | TEMPERATURE: 98 F | HEART RATE: 55 BPM | DIASTOLIC BLOOD PRESSURE: 80 MMHG | RESPIRATION RATE: 18 BRPM | OXYGEN SATURATION: 96 % | HEIGHT: 70 IN

## 2021-02-01 DIAGNOSIS — R33.9 URINARY RETENTION: ICD-10-CM

## 2021-02-01 DIAGNOSIS — N17.9 ACUTE RENAL FAILURE, UNSPECIFIED ACUTE RENAL FAILURE TYPE (HCC): Primary | ICD-10-CM

## 2021-02-01 PROBLEM — E87.20 METABOLIC ACIDOSIS: Status: ACTIVE | Noted: 2021-02-01

## 2021-02-01 PROBLEM — R73.9 HYPERGLYCEMIA: Status: ACTIVE | Noted: 2021-02-01

## 2021-02-01 PROBLEM — E87.2 METABOLIC ACIDOSIS: Status: ACTIVE | Noted: 2021-02-01

## 2021-02-01 LAB
ALBUMIN SERPL-MCNC: 4 G/DL (ref 3.4–5)
ALBUMIN/GLOB SERPL: 1 {RATIO} (ref 1–2)
ALP LIVER SERPL-CCNC: 85 U/L
ALT SERPL-CCNC: 76 U/L
ANION GAP SERPL CALC-SCNC: 12 MMOL/L (ref 0–18)
AST SERPL-CCNC: 62 U/L (ref 15–37)
BASOPHILS # BLD AUTO: 0.03 X10(3) UL (ref 0–0.2)
BASOPHILS NFR BLD AUTO: 0.3 %
BILIRUB SERPL-MCNC: 1.2 MG/DL (ref 0.1–2)
BILIRUB UR QL STRIP.AUTO: NEGATIVE
BUN BLD-MCNC: 18 MG/DL (ref 7–18)
BUN/CREAT SERPL: 7.2 (ref 10–20)
CALCIUM BLD-MCNC: 10.1 MG/DL (ref 8.5–10.1)
CHLORIDE SERPL-SCNC: 106 MMOL/L (ref 98–112)
CO2 SERPL-SCNC: 19 MMOL/L (ref 21–32)
COLOR UR AUTO: YELLOW
CREAT BLD-MCNC: 2.51 MG/DL
DEPRECATED RDW RBC AUTO: 47 FL (ref 35.1–46.3)
EOSINOPHIL # BLD AUTO: 0.01 X10(3) UL (ref 0–0.7)
EOSINOPHIL NFR BLD AUTO: 0.1 %
ERYTHROCYTE [DISTWIDTH] IN BLOOD BY AUTOMATED COUNT: 14.2 % (ref 11–15)
GLOBULIN PLAS-MCNC: 3.9 G/DL (ref 2.8–4.4)
GLUCOSE BLD-MCNC: 142 MG/DL (ref 70–99)
GLUCOSE UR STRIP.AUTO-MCNC: NEGATIVE MG/DL
HCT VFR BLD AUTO: 46 %
HGB BLD-MCNC: 16.1 G/DL
IMM GRANULOCYTES # BLD AUTO: 0.04 X10(3) UL (ref 0–1)
IMM GRANULOCYTES NFR BLD: 0.4 %
KETONES UR STRIP.AUTO-MCNC: NEGATIVE MG/DL
LEUKOCYTE ESTERASE UR QL STRIP.AUTO: NEGATIVE
LYMPHOCYTES # BLD AUTO: 1.21 X10(3) UL (ref 1–4)
LYMPHOCYTES NFR BLD AUTO: 10.7 %
M PROTEIN MFR SERPL ELPH: 7.9 G/DL (ref 6.4–8.2)
MCH RBC QN AUTO: 31.5 PG (ref 26–34)
MCHC RBC AUTO-ENTMCNC: 35 G/DL (ref 31–37)
MCV RBC AUTO: 90 FL
MONOCYTES # BLD AUTO: 1.18 X10(3) UL (ref 0.1–1)
MONOCYTES NFR BLD AUTO: 10.5 %
NEUTROPHILS # BLD AUTO: 8.79 X10 (3) UL (ref 1.5–7.7)
NEUTROPHILS # BLD AUTO: 8.79 X10(3) UL (ref 1.5–7.7)
NEUTROPHILS NFR BLD AUTO: 78 %
NITRITE UR QL STRIP.AUTO: NEGATIVE
OSMOLALITY SERPL CALC.SUM OF ELEC: 288 MOSM/KG (ref 275–295)
PH UR STRIP.AUTO: 5 [PH] (ref 4.5–8)
PLATELET # BLD AUTO: 215 10(3)UL (ref 150–450)
POTASSIUM SERPL-SCNC: 4.1 MMOL/L (ref 3.5–5.1)
PROT UR STRIP.AUTO-MCNC: NEGATIVE MG/DL
RBC # BLD AUTO: 5.11 X10(6)UL
SODIUM SERPL-SCNC: 137 MMOL/L (ref 136–145)
SP GR UR STRIP.AUTO: 1.01 (ref 1–1.03)
UROBILINOGEN UR STRIP.AUTO-MCNC: <2 MG/DL
WBC # BLD AUTO: 11.3 X10(3) UL (ref 4–11)

## 2021-02-01 PROCEDURE — 99285 EMERGENCY DEPT VISIT HI MDM: CPT

## 2021-02-01 PROCEDURE — 85025 COMPLETE CBC W/AUTO DIFF WBC: CPT | Performed by: EMERGENCY MEDICINE

## 2021-02-01 PROCEDURE — 99284 EMERGENCY DEPT VISIT MOD MDM: CPT

## 2021-02-01 PROCEDURE — 81001 URINALYSIS AUTO W/SCOPE: CPT | Performed by: EMERGENCY MEDICINE

## 2021-02-01 PROCEDURE — 72110 X-RAY EXAM L-2 SPINE 4/>VWS: CPT | Performed by: EMERGENCY MEDICINE

## 2021-02-01 PROCEDURE — 80053 COMPREHEN METABOLIC PANEL: CPT | Performed by: EMERGENCY MEDICINE

## 2021-02-01 PROCEDURE — 36415 COLL VENOUS BLD VENIPUNCTURE: CPT

## 2021-02-01 PROCEDURE — 51702 INSERT TEMP BLADDER CATH: CPT

## 2021-02-01 PROCEDURE — 74018 RADEX ABDOMEN 1 VIEW: CPT | Performed by: EMERGENCY MEDICINE

## 2021-02-01 RX ORDER — CEPHALEXIN 500 MG/1
500 CAPSULE ORAL ONCE
Status: DISCONTINUED | OUTPATIENT
Start: 2021-02-01 | End: 2021-02-01

## 2021-02-01 RX ORDER — CEPHALEXIN 500 MG/1
500 CAPSULE ORAL 3 TIMES DAILY
Qty: 30 CAPSULE | Refills: 0 | Status: SHIPPED | OUTPATIENT
Start: 2021-02-01 | End: 2021-02-11

## 2021-02-01 NOTE — ED INITIAL ASSESSMENT (HPI)
Pt presents to the ED with complaints of fall out of bed yesterday. Pt states he felt like he pulled muscles in his abdomen with fall and has had continued abdominal pain and has not urinated since fall.  Pt also c/o leg weakness since fall, now using walke

## 2021-02-01 NOTE — TELEPHONE ENCOUNTER
Pt last seen in 9/2020. States he has not voided in 24 hours and is having abdominal pain 10/10. Asked pt if he has contacted PCP - pt not very clear about this. Asked pt to present to ED due to inability to void and abdominal pain.  Pt reluctant to go - ex

## 2021-02-02 ENCOUNTER — TELEPHONE (OUTPATIENT)
Dept: FAMILY MEDICINE CLINIC | Facility: CLINIC | Age: 76
End: 2021-02-02

## 2021-02-02 ENCOUNTER — TELEPHONE (OUTPATIENT)
Dept: SURGERY | Facility: CLINIC | Age: 76
End: 2021-02-02

## 2021-02-02 ENCOUNTER — OFFICE VISIT (OUTPATIENT)
Dept: FAMILY MEDICINE CLINIC | Facility: CLINIC | Age: 76
End: 2021-02-02
Payer: MEDICARE

## 2021-02-02 VITALS
HEIGHT: 70 IN | DIASTOLIC BLOOD PRESSURE: 70 MMHG | HEART RATE: 74 BPM | OXYGEN SATURATION: 96 % | SYSTOLIC BLOOD PRESSURE: 134 MMHG | RESPIRATION RATE: 16 BRPM | WEIGHT: 253.63 LBS | BODY MASS INDEX: 36.31 KG/M2

## 2021-02-02 DIAGNOSIS — R33.9 URINARY RETENTION: Primary | ICD-10-CM

## 2021-02-02 DIAGNOSIS — Z23 NEED FOR VACCINATION: ICD-10-CM

## 2021-02-02 DIAGNOSIS — R31.9 HEMATURIA, UNSPECIFIED TYPE: ICD-10-CM

## 2021-02-02 DIAGNOSIS — N17.9 AKI (ACUTE KIDNEY INJURY) (HCC): ICD-10-CM

## 2021-02-02 DIAGNOSIS — Z97.8 FOLEY CATHETER IN PLACE: ICD-10-CM

## 2021-02-02 PROCEDURE — 3008F BODY MASS INDEX DOCD: CPT | Performed by: FAMILY MEDICINE

## 2021-02-02 PROCEDURE — 99215 OFFICE O/P EST HI 40 MIN: CPT | Performed by: FAMILY MEDICINE

## 2021-02-02 PROCEDURE — 3078F DIAST BP <80 MM HG: CPT | Performed by: FAMILY MEDICINE

## 2021-02-02 PROCEDURE — 3075F SYST BP GE 130 - 139MM HG: CPT | Performed by: FAMILY MEDICINE

## 2021-02-02 RX ORDER — ASPIRIN 81 MG/1
81 TABLET ORAL DAILY
COMMUNITY
Start: 2021-01-22 | End: 2021-02-26

## 2021-02-02 NOTE — TELEPHONE ENCOUNTER
Mr. João Harrell is having trouble making the appointment and the urologist, Dr. Kandice Beth said he needs Dr. Dustin Alonzo to call him.  Please call [phone] before Friday, 2/5/21

## 2021-02-02 NOTE — TELEPHONE ENCOUNTER
Spoke with Dr Eddie Correa office who reports no availability until 2/23. Will send message to provider to see if patient can be seen sooner. Ideally needs to be seen this week. Dr. Juan Orlando made aware. Update given to patient. He verbalized understanding.

## 2021-02-02 NOTE — TELEPHONE ENCOUNTER
PSR- can you set up an appt with Dr. Obey Santana for this afternoon for a virtual visit? Thanks! Dr. Edith Moses call from ER provider last night. Genesis Arboleda had significant urinary retention after a fall leading to significant abdominal pain.  Labs showed

## 2021-02-02 NOTE — TELEPHONE ENCOUNTER
PCP office calling Dr. Laine Robert states pt needs to be seen this week for urinary retention , miller and blood in urine please advise

## 2021-02-02 NOTE — TELEPHONE ENCOUNTER
Patient does not need miller changed, he simply needs instruction on how to empty the bag. Will address at today's appointment.

## 2021-02-02 NOTE — PROGRESS NOTES
Patient presents with:  Cath Tube Problem: Cath Bag needs to be changed     HPI:   Sheela Branham is a 66year old male who presents to the office for ED follow up.   Pt reports he had  fallen on the ground - and when trying to push himself up felt a te placement     Coronary artery disease involving native coronary artery of native heart with angina pectoris (HCC)     Atrial fibrillation with rapid ventricular response (HCC)     Acute posterior anal fissure     Slow transit constipation     Hypothyroidis hip.  Contents - about 700-800cc of mildly bloody urine. I assisted patient and taught wife how to empty the miller bag.      Results for orders placed or performed during the hospital encounter of 94/09/41   COMP METABOLIC PANEL (14)    Collection Time: 02 REFLEX    Collection Time: 02/01/21  6:09 PM    Specimen: Urine   Result Value Ref Range    Urine Color Yellow Yellow    Clarity Urine Hazy (A) Clear    Spec Gravity 1.009 1.001 - 1.030    Glucose Urine Negative Negative mg/dl    Bilirubin Urine Negative N this could be prostate related as well  I will try to get him in touch with the urology team to investigate this issue further.      Encounter duration 40 minutes during which 30 min was spent with patient reviewing his symptoms, teaching miller emptying, di

## 2021-02-03 ENCOUNTER — TELEPHONE (OUTPATIENT)
Dept: SURGERY | Facility: CLINIC | Age: 76
End: 2021-02-03

## 2021-02-03 NOTE — ED PROVIDER NOTES
Patient Seen in: [facility] Emergency Department      History   Patient presents with:  Fall    Stated Complaint: fall yesterday morning, pulled something in groin area, unable to void since in*    HPI/Subjective:   HPI    Pleasant 77-year-old morbi when he pushed himself up from the ground he did not sustain any injury or pulling sensation in his low back.   Objective:   Past Medical History:   Diagnosis Date   • Anal fissure    • Anesthesia complication     twilight does not work- needs total anesthe Alcohol/week: 1.0 standard drinks      Types: 1 Standard drinks or equivalent per week      Frequency: 2-3 times a week      Drinks per session: 1 or 2      Binge frequency: Never    Drug use:  No             Review of Systems    Positive for stated complai normal. There is distension. Palpations: Abdomen is soft. There is no mass. Tenderness: There is abdominal tenderness. There is no right CVA tenderness, left CVA tenderness, guarding or rebound. Hernia: No hernia is present.       Comments: S within normal limits   CBC WITH DIFFERENTIAL WITH PLATELET    Narrative: The following orders were created for panel order CBC WITH DIFFERENTIAL WITH PLATELET.   Procedure                               Abnormality         Status                     ---- previous fusion from L4-S1 with pedicular screws. There is no acute fracture. DISC SPACES:  There is moderate disc space narrowing and vacuum disc     phenomenon at L3-L4. Remaining disc spaces are preserved.     PARASPINOUS:  There is diffuse aort the hospital for him. There is no signs of infection in his urine, while his creatinine is elevated and there the obstruction has been removed and it should improve as long as we are vigilant with checking.   His wife was not particularly excited about goi

## 2021-02-03 NOTE — TELEPHONE ENCOUNTER
Phoned patient, left voicemail, to see if he is able to make appointment for stent removal tomorrow, Thursday, February 4th at 12:30 (arrive 12:15) with Dr. Noni Chisholm. Please send call to ext [phone]/[phone] when patient calls back. Thank you.

## 2021-02-04 ENCOUNTER — HOSPITAL ENCOUNTER (OUTPATIENT)
Dept: ULTRASOUND IMAGING | Facility: HOSPITAL | Age: 76
Discharge: HOME | End: 2021-02-04
Attending: UROLOGY
Payer: MEDICARE

## 2021-02-04 ENCOUNTER — OFFICE VISIT (OUTPATIENT)
Dept: SURGERY | Facility: CLINIC | Age: 76
End: 2021-02-04
Payer: MEDICARE

## 2021-02-04 ENCOUNTER — LAB ENCOUNTER (OUTPATIENT)
Dept: LAB | Age: 76
End: 2021-02-04
Attending: UROLOGY
Payer: MEDICARE

## 2021-02-04 VITALS — HEART RATE: 59 BPM | TEMPERATURE: 97 F | SYSTOLIC BLOOD PRESSURE: 129 MMHG | DIASTOLIC BLOOD PRESSURE: 79 MMHG

## 2021-02-04 DIAGNOSIS — N17.9 ACUTE RENAL FAILURE, UNSPECIFIED ACUTE RENAL FAILURE TYPE (HCC): ICD-10-CM

## 2021-02-04 DIAGNOSIS — R33.9 URINARY RETENTION: ICD-10-CM

## 2021-02-04 DIAGNOSIS — N40.1 BENIGN PROSTATIC HYPERPLASIA WITH URINARY FREQUENCY: ICD-10-CM

## 2021-02-04 DIAGNOSIS — R33.9 URINARY RETENTION: Primary | ICD-10-CM

## 2021-02-04 DIAGNOSIS — R35.0 BENIGN PROSTATIC HYPERPLASIA WITH URINARY FREQUENCY: ICD-10-CM

## 2021-02-04 LAB
ANION GAP SERPL CALC-SCNC: 5 MMOL/L (ref 0–18)
BUN BLD-MCNC: 19 MG/DL (ref 7–18)
BUN/CREAT SERPL: 13 (ref 10–20)
CALCIUM BLD-MCNC: 9.8 MG/DL (ref 8.5–10.1)
CHLORIDE SERPL-SCNC: 108 MMOL/L (ref 98–112)
CO2 SERPL-SCNC: 28 MMOL/L (ref 21–32)
COMPLEXED PSA SERPL-MCNC: 6 NG/ML (ref ?–4)
CREAT BLD-MCNC: 1.46 MG/DL
GLUCOSE BLD-MCNC: 116 MG/DL (ref 70–99)
OSMOLALITY SERPL CALC.SUM OF ELEC: 295 MOSM/KG (ref 275–295)
PATIENT FASTING Y/N/NP: NO
POTASSIUM SERPL-SCNC: 4.3 MMOL/L (ref 3.5–5.1)
SODIUM SERPL-SCNC: 141 MMOL/L (ref 136–145)

## 2021-02-04 PROCEDURE — 3078F DIAST BP <80 MM HG: CPT | Performed by: UROLOGY

## 2021-02-04 PROCEDURE — 36415 COLL VENOUS BLD VENIPUNCTURE: CPT

## 2021-02-04 PROCEDURE — 76775 US EXAM ABDO BACK WALL LIM: CPT | Performed by: UROLOGY

## 2021-02-04 PROCEDURE — 80048 BASIC METABOLIC PNL TOTAL CA: CPT

## 2021-02-04 PROCEDURE — 99203 OFFICE O/P NEW LOW 30 MIN: CPT | Performed by: UROLOGY

## 2021-02-04 PROCEDURE — 3074F SYST BP LT 130 MM HG: CPT | Performed by: UROLOGY

## 2021-02-04 RX ORDER — TAMSULOSIN HYDROCHLORIDE 0.4 MG/1
0.4 CAPSULE ORAL DAILY
Qty: 30 CAPSULE | Refills: 5 | Status: SHIPPED | OUTPATIENT
Start: 2021-02-04 | End: 2021-02-04

## 2021-02-04 RX ORDER — TAMSULOSIN HYDROCHLORIDE 0.4 MG/1
0.4 CAPSULE ORAL DAILY
Qty: 30 CAPSULE | Refills: 5 | Status: SHIPPED | OUTPATIENT
Start: 2021-02-04 | End: 2021-02-13

## 2021-02-04 NOTE — PROGRESS NOTES
Rooming Clinician:     Camacho Solomon is a 66year old male.   Patient presents with:  Consult: urinary retention-has catheter        HPI:     Patient comes to the office with his spouse referred from the emergency room with history of acute urinary ret nightly. 30 tablet 5   • aspirin 81 MG Oral Tab EC Take 81 mg by mouth daily. • Multiple Vitamins-Minerals (OPTISOURCE POST BARIATRIC SURG) Oral Chew Tab Chew by mouth.        No Known Allergies   Past Medical History:   Diagnosis Date   • Anal fissure Former Smoker      Smokeless tobacco: Never Used    Alcohol use:  Yes      Alcohol/week: 1.0 standard drinks      Types: 1 Standard drinks or equivalent per week      Frequency: 2-3 times a week      Drinks per session: 1 or 2      Binge frequency: Never retention  Enlarged prostate  Acute kidney injury    PLAN:     Patient adamantly demands the catheter be removed today. We obtained a urine for culture and sensitivity. Stat renal ultrasound and basic metabolic panel  Begin tamsulosin 0.4 mg p.o. daily. consider insertion of Shukla catheter and then repeat voiding trial next week    The patient indicates understanding of these issues and agrees to the plan. Amando Stock M.D.   Urology

## 2021-02-04 NOTE — PATIENT INSTRUCTIONS
On behalf of myself and care team, I would like to thank you for entrusting us with your care today. It is our goal to provide you and your family with excellent care.   Please note that you may receive a survey in the mail; any feedback you have for this or stuffy nose  · trouble sleeping  What may interact with this medicine?   · cimetidine  · fluoxetine  · ketoconazole  · medicines for erectile disfunction like sildenafil, tadalafil, vardenafil  · medicines for high blood pressure  · other alpha-blockers how this medicine affects you. Do not sit or stand up quickly. If you begin to feel dizzy, sit down until you feel better. These effects can decrease once your body adjusts to the medicine.   Contact your doctor or health care professional right away if you

## 2021-02-04 NOTE — PROGRESS NOTES
Your recent renal ultrasound is normal.  Recommend follow up in the office as directed.     Sincerely,  Donell Gardner MD

## 2021-02-05 ENCOUNTER — TELEPHONE (OUTPATIENT)
Dept: SURGERY | Facility: CLINIC | Age: 76
End: 2021-02-05

## 2021-02-05 ENCOUNTER — NURSE ONLY (OUTPATIENT)
Dept: SURGERY | Facility: CLINIC | Age: 76
End: 2021-02-05
Payer: MEDICARE

## 2021-02-05 DIAGNOSIS — R33.9 RETENTION OF URINE: Primary | ICD-10-CM

## 2021-02-05 PROCEDURE — 51798 US URINE CAPACITY MEASURE: CPT | Performed by: UROLOGY

## 2021-02-05 NOTE — TELEPHONE ENCOUNTER
I spoke to wife. She states that the pt is unable to urinate. I discussed that the pt has an appt to see us at 8:20am.  She responded that the taxi cannot get to there house until 9:00 am and she asked if she should take him to the ER.   I asked if he was i

## 2021-02-05 NOTE — PROGRESS NOTES
Patient here with wife at bedside. Patient observed to be wheezing, slow to walk with his walker, and weak. Identified patient's name, date of birth and his reason for the visit. RN discussed plans of PVR check and possible cath insertion.      Per wife

## 2021-02-06 NOTE — PROGRESS NOTES
Your recent culture shows no growth is normal.  Recommend follow up in the office as directed.     Sincerely,  Janell Arzola MD

## 2021-02-08 NOTE — PROGRESS NOTES
Your recent urine culture shows no growth and is normal.  Recommend follow up in the office as directed.     Sincerely,  Avis Cardoso MD

## 2021-02-10 ENCOUNTER — TELEPHONE (OUTPATIENT)
Dept: SURGERY | Facility: CLINIC | Age: 76
End: 2021-02-10

## 2021-02-10 NOTE — TELEPHONE ENCOUNTER
LVMM on phone with pt greeting. Read message as stated below from Dr. Ortega Perez. If add'l questions, please call [phone]. Encounter complete.

## 2021-02-10 NOTE — TELEPHONE ENCOUNTER
----- Message from Brit Cardona MD sent at 2/6/2021 11:45 AM CST -----  Your recent culture shows no growth is normal.  Recommend follow up in the office as directed.     Sincerely,  Whit Figueroa MD

## 2021-02-10 NOTE — TELEPHONE ENCOUNTER
----- Message from Sandy Hutchison MD sent at 2/6/2021 11:45 AM CST -----  Your recent culture shows no growth is normal.  Recommend follow up in the office as directed.     Sincerely,  Radha Woody MD

## 2021-02-12 ENCOUNTER — OFFICE VISIT (OUTPATIENT)
Dept: SURGERY | Facility: CLINIC | Age: 76
End: 2021-02-12
Payer: MEDICARE

## 2021-02-12 VITALS — DIASTOLIC BLOOD PRESSURE: 69 MMHG | HEART RATE: 69 BPM | TEMPERATURE: 97 F | SYSTOLIC BLOOD PRESSURE: 103 MMHG

## 2021-02-12 DIAGNOSIS — N13.8 BPH WITH OBSTRUCTION/LOWER URINARY TRACT SYMPTOMS: Primary | ICD-10-CM

## 2021-02-12 DIAGNOSIS — R33.9 URINARY RETENTION: ICD-10-CM

## 2021-02-12 DIAGNOSIS — N40.1 BPH WITH OBSTRUCTION/LOWER URINARY TRACT SYMPTOMS: Primary | ICD-10-CM

## 2021-02-12 PROCEDURE — 99213 OFFICE O/P EST LOW 20 MIN: CPT | Performed by: UROLOGY

## 2021-02-12 PROCEDURE — 3078F DIAST BP <80 MM HG: CPT | Performed by: UROLOGY

## 2021-02-12 PROCEDURE — 3074F SYST BP LT 130 MM HG: CPT | Performed by: UROLOGY

## 2021-02-12 RX ORDER — ROSUVASTATIN CALCIUM 20 MG/1
20 TABLET, COATED ORAL EVERY EVENING
Qty: 90 TABLET | Refills: 3 | Status: SHIPPED | OUTPATIENT
Start: 2021-02-12

## 2021-02-12 NOTE — PROGRESS NOTES
Rooming Clinician:     Katrin Kyes is a 66year old male. Patient presents with: Follow - Up: c/o urinary retention. reinserted miller Monday. HPI:     Patient returns with history of urinary retention. He wants to catheter to be removed. unaware of diagnosis   • Fever    • Hearing impairment     bilateral hearing loss   • Heart attack (HCC)    • High blood pressure    • High cholesterol    • Hyperlipidemia    • IBS (irritable bowel syndrome)     irregular bowel patterns (takes miralax)   • heartburn  : see HPI  NEURO: no sensory or motor complaint    EXAM:     /69   Pulse 69   Temp 97 °F (36.1 °C)   GENERAL: well developed, well nourished,in no apparent distress  SKIN: no rashes,no suspicious lesions  HEENT: atraumatic, normocephalic patient indicates understanding of these issues and agrees to the plan. Obey Arellano M.D.   Urology

## 2021-02-13 RX ORDER — TAMSULOSIN HYDROCHLORIDE 0.4 MG/1
0.4 CAPSULE ORAL 2 TIMES DAILY
Qty: 180 CAPSULE | Refills: 3 | Status: SHIPPED | OUTPATIENT
Start: 2021-02-13

## 2021-02-13 RX ORDER — DUTASTERIDE 0.5 MG/1
0.5 CAPSULE, LIQUID FILLED ORAL DAILY
Qty: 90 CAPSULE | Refills: 1 | Status: SHIPPED | OUTPATIENT
Start: 2021-02-13 | End: 2021-07-23

## 2021-02-18 ENCOUNTER — PROCEDURE (OUTPATIENT)
Dept: SURGERY | Facility: CLINIC | Age: 76
End: 2021-02-18
Payer: MEDICARE

## 2021-02-18 VITALS — DIASTOLIC BLOOD PRESSURE: 75 MMHG | SYSTOLIC BLOOD PRESSURE: 112 MMHG | HEART RATE: 64 BPM

## 2021-02-18 DIAGNOSIS — R97.20 ELEVATED PSA: ICD-10-CM

## 2021-02-18 DIAGNOSIS — N40.1 ENLARGED PROSTATE WITH URINARY RETENTION: Primary | ICD-10-CM

## 2021-02-18 DIAGNOSIS — R33.8 ENLARGED PROSTATE WITH URINARY RETENTION: Primary | ICD-10-CM

## 2021-02-18 PROCEDURE — 3078F DIAST BP <80 MM HG: CPT | Performed by: UROLOGY

## 2021-02-18 PROCEDURE — 3074F SYST BP LT 130 MM HG: CPT | Performed by: UROLOGY

## 2021-02-18 PROCEDURE — 76872 US TRANSRECTAL: CPT | Performed by: UROLOGY

## 2021-02-18 PROCEDURE — 52000 CYSTOURETHROSCOPY: CPT | Performed by: UROLOGY

## 2021-02-18 RX ORDER — CIPROFLOXACIN 500 MG/1
500 TABLET, FILM COATED ORAL ONCE
Status: COMPLETED | OUTPATIENT
Start: 2021-02-18 | End: 2021-02-18

## 2021-02-18 RX ADMIN — CIPROFLOXACIN 500 MG: 500 TABLET, FILM COATED ORAL at 17:32:00

## 2021-02-18 NOTE — PROGRESS NOTES
Rooming Clinician:     Laney Reyes is a 66year old male. Patient presents with:  Procedure: cystoscopy and transrectal ultrasound        HPI:     Comes to the office today for cystoscopy and transrectal ultrasound measurement of prostate.  Is a rec History:   Diagnosis Date   • Anal fissure    • Anesthesia complication     twilight does not work- needs total anesthesia   • Anxiety state     hx PTSD   • Atherosclerosis of coronary artery    • Cataract     cataract surgery   • Chronic atrial fibrillati session: 1 or 2      Binge frequency: Never    Drug use: No       REVIEW OF SYSTEMS:     GENERAL HEALTH: feels well otherwise  SKIN: denies any unusual skin lesions or rashes  RESPIRATORY: denies shortness of breath with exertion  CARDIOVASCULAR: denies ch postoperative instructions. Patient was then turned in a left lateral decubitus position on the examining table. Digital examination was then done at which time the sphincter was gently dilated. The apex of the prostate was palpated.  I could not reach o procedures. Does recommend that he continue both tamsulosin and dutasteride.     Diagnoses and all orders for this visit:    Enlarged prostate with urinary retention    Other orders  -     Ciprofloxacin HCl (CIPRO) tab 500 mg        Follow up examination to

## 2021-02-19 ENCOUNTER — HOSPITAL ENCOUNTER (EMERGENCY)
Facility: HOSPITAL | Age: 76
Discharge: HOME | End: 2021-02-19
Attending: EMERGENCY MEDICINE
Payer: MEDICARE

## 2021-02-19 ENCOUNTER — TELEPHONE (OUTPATIENT)
Dept: SURGERY | Facility: CLINIC | Age: 76
End: 2021-02-19

## 2021-02-19 VITALS
SYSTOLIC BLOOD PRESSURE: 117 MMHG | HEART RATE: 70 BPM | TEMPERATURE: 98 F | WEIGHT: 260 LBS | DIASTOLIC BLOOD PRESSURE: 60 MMHG | BODY MASS INDEX: 37 KG/M2 | RESPIRATION RATE: 20 BRPM | OXYGEN SATURATION: 93 %

## 2021-02-19 DIAGNOSIS — R33.8 BENIGN PROSTATIC HYPERPLASIA WITH URINARY RETENTION: ICD-10-CM

## 2021-02-19 DIAGNOSIS — N40.1 BENIGN PROSTATIC HYPERPLASIA WITH URINARY RETENTION: ICD-10-CM

## 2021-02-19 DIAGNOSIS — R33.9 URINARY RETENTION: Primary | ICD-10-CM

## 2021-02-19 LAB
GLUCOSE UR STRIP.AUTO-MCNC: NEGATIVE MG/DL
KETONES UR STRIP.AUTO-MCNC: NEGATIVE MG/DL
LEUKOCYTE ESTERASE UR QL STRIP.AUTO: NEGATIVE
NITRITE UR QL STRIP.AUTO: NEGATIVE
PH UR STRIP.AUTO: 6 [PH] (ref 4.5–8)
PROT UR STRIP.AUTO-MCNC: >=300 MG/DL
RBC #/AREA URNS AUTO: >10 /HPF
SP GR UR STRIP.AUTO: 1.01 (ref 1–1.03)
UROBILINOGEN UR STRIP.AUTO-MCNC: 0.2 MG/DL

## 2021-02-19 PROCEDURE — 81001 URINALYSIS AUTO W/SCOPE: CPT | Performed by: EMERGENCY MEDICINE

## 2021-02-19 PROCEDURE — 87086 URINE CULTURE/COLONY COUNT: CPT | Performed by: EMERGENCY MEDICINE

## 2021-02-19 PROCEDURE — 99283 EMERGENCY DEPT VISIT LOW MDM: CPT

## 2021-02-19 RX ORDER — LIDOCAINE HYDROCHLORIDE 20 MG/ML
10 JELLY TOPICAL ONCE
Status: COMPLETED | OUTPATIENT
Start: 2021-02-19 | End: 2021-02-19

## 2021-02-19 RX ORDER — LIDOCAINE HYDROCHLORIDE 20 MG/ML
JELLY TOPICAL
Status: COMPLETED
Start: 2021-02-19 | End: 2021-02-19

## 2021-02-19 NOTE — TELEPHONE ENCOUNTER
Pt called stating pt had appointment 2-18-21 and removed the catheter. pt went to [facility] emergency room last night for urinary retention. Placed a catheter. Pt requesting to speak to the nurse Kateryna Vega.    Please call

## 2021-02-19 NOTE — TELEPHONE ENCOUNTER
RN called patient/wife. Spoke to both of them. They are going to call me back to set up a follow up appointment with Dr Nimisha Zhu next week while also agreeing to keep the Shukla for a week. Both verbalized understanding.

## 2021-02-19 NOTE — ED PROVIDER NOTES
Patient Seen in: [facility] Emergency Department      History   Patient presents with:  Retention    Stated Complaint: urinary retention    HPI/Subjective:   HPI    68-year-old male followed by Dr. Sol Miles from urology for BPH.   He has had indwellin KNEE SURGERY Left     ACL   • LUMBAR EPIDURAL N/A 3/11/2014    Performed by Maribell Rodriguez MD at [address] 1/29/2014    Performed by Maribell Rodriguez MD at [address]   • NEEDLE BIOPSY LIVER other components within normal limits   URINE CULTURE, ROUTINE          MDM        Patient had Shukla catheter placed with about 1600 cc of brownish urine without clots. Patient felt better.   He will go home with a leg bag and to follow-up with Dr. Prachi Perez

## 2021-02-19 NOTE — ED INITIAL ASSESSMENT (HPI)
75YM c/c of retention Pt state that he been having urinary retention issus es for the last two week had a cathter removed yesterday and not been bale to urinate since

## 2021-02-22 ENCOUNTER — TELEPHONE (OUTPATIENT)
Dept: SURGERY | Facility: CLINIC | Age: 76
End: 2021-02-22

## 2021-02-22 NOTE — TELEPHONE ENCOUNTER
RN called patient back in response to his recent call,\"Per pt calling to discuss plan of care. pt states catheter is very troublesome and states he wants to schedule procedure and remove asap.  Please advise\"     Note, patient was recently at the office f

## 2021-02-22 NOTE — TELEPHONE ENCOUNTER
Per pt calling to discuss plan of care. pt states catheter is very troublesome and states he wants to schedule procedure and remove asap.  Please advise

## 2021-02-23 NOTE — TELEPHONE ENCOUNTER
Appt has been made for 2/25/2021 at 10:00am.  I called Luis Alberto Bell (wife) and confirmed the appt with them.

## 2021-02-24 ENCOUNTER — APPOINTMENT (OUTPATIENT)
Dept: CARDIOLOGY | Age: 76
End: 2021-02-24

## 2021-02-25 ENCOUNTER — OFFICE VISIT (OUTPATIENT)
Dept: SURGERY | Facility: CLINIC | Age: 76
End: 2021-02-25
Payer: MEDICARE

## 2021-02-25 ENCOUNTER — TELEPHONE (OUTPATIENT)
Dept: FAMILY MEDICINE CLINIC | Facility: CLINIC | Age: 76
End: 2021-02-25

## 2021-02-25 ENCOUNTER — TELEPHONE (OUTPATIENT)
Dept: SURGERY | Facility: CLINIC | Age: 76
End: 2021-02-25

## 2021-02-25 VITALS — DIASTOLIC BLOOD PRESSURE: 58 MMHG | SYSTOLIC BLOOD PRESSURE: 92 MMHG | HEART RATE: 79 BPM | TEMPERATURE: 97 F

## 2021-02-25 DIAGNOSIS — R35.0 BENIGN PROSTATIC HYPERPLASIA WITH URINARY FREQUENCY: Primary | ICD-10-CM

## 2021-02-25 DIAGNOSIS — R33.9 RETENTION OF URINE: ICD-10-CM

## 2021-02-25 DIAGNOSIS — R33.8 ENLARGED PROSTATE WITH URINARY RETENTION: Primary | ICD-10-CM

## 2021-02-25 DIAGNOSIS — N40.1 BENIGN PROSTATIC HYPERPLASIA WITH URINARY FREQUENCY: Primary | ICD-10-CM

## 2021-02-25 DIAGNOSIS — N40.1 ENLARGED PROSTATE WITH URINARY RETENTION: Primary | ICD-10-CM

## 2021-02-25 PROCEDURE — 99213 OFFICE O/P EST LOW 20 MIN: CPT | Performed by: UROLOGY

## 2021-02-25 PROCEDURE — 3078F DIAST BP <80 MM HG: CPT | Performed by: UROLOGY

## 2021-02-25 PROCEDURE — 3074F SYST BP LT 130 MM HG: CPT | Performed by: UROLOGY

## 2021-02-25 NOTE — PATIENT INSTRUCTIONS
Transurethral Resection of the Prostate (TURP): Hospital Recovery     After surgery, you’ll first go to the recovery room. Then you'll be moved to a regular hospital room. In most cases, you won’t go home until you can pass urine on your own.  You may b © 1116-4902 [employer] [address]. All rights reserved. This information is not intended as a substitute for professional medical care. Always follow your healthcare professional's instructions. Transurethral Resection of the Prostate (TURP):  Rodolfo Davila · You have severe pain that is not relieved by prescription pain medicine  · You have bleeding that doesn’t stop within 12 hours  · You have bleeding with clots, or blood plugs up the catheter. (A little blood in the urine is normal.)  · The catheter falls · TUNA (transurethral needle ablation)  · Laser ablation  · Microwave therapy  · UroLift system (uses permanent implants to lift and move prostate tissue blocking the urethra)  · Rezum procedure (a water-vapor treatment that uses steam to reduce the size o After some surgical treatments, you may have retrograde ejaculation. This is when semen goes into the bladder during ejaculation, instead of out of the penis. As a result, there may be little or no semen when you ejaculate.  You may not be able to have chil

## 2021-02-25 NOTE — PROGRESS NOTES
Rooming Clinician:     Star Starks is a 66year old male. Patient presents with: Follow - Up: to discuss plans of surgery; has Shukla. HPI:     Patient has an enlarged prostate, approximately 80 cc who has had refractory urinary retention. diagnosis   • Fever    • Hearing impairment     bilateral hearing loss   • Heart attack (HCC)    • High blood pressure    • High cholesterol    • Hyperlipidemia    • IBS (irritable bowel syndrome)     irregular bowel patterns (takes miralax)   • Irregular HPI  NEURO: no sensory or motor complaint    EXAM:     BP 92/58 (BP Location: Right arm, Patient Position: Sitting, Cuff Size: large)   Pulse 79   Temp 97.3 °F (36.3 °C) (Temporal)   GENERAL: well developed, well nourished,in no apparent distress  SKIN: no frequency, urgency and even nighttime urination. The procedure involves some risks such as bleeding which is normal and expected after trans urethral surgery which gradually clears over several days but can occur intermittently for several weeks.   Rarely

## 2021-02-25 NOTE — TELEPHONE ENCOUNTER
Can you call and let Dr. Wiliam Anton office at least know that Dr. Kandy Guzman will not see message until tomorrow?

## 2021-02-25 NOTE — TELEPHONE ENCOUNTER
Called and talked to [facility] in Dr Jo Ann Whittaker office told them Dr Hannah Holt not in office today

## 2021-02-25 NOTE — TELEPHONE ENCOUNTER
Dr. David Newell called to discuss the patient with Dr. Obey Santana. He said he only needed and wanted to speak with him. Please call at [phone].

## 2021-02-25 NOTE — TELEPHONE ENCOUNTER
Surgeon: [facility] : edward  Assistant:   Location:   Procedure: Cystoscopy, transurethral resection of prostate/bipolar  Anesthesia: Spinal  Time frame: 90 minutes  Diagnosis: Enlarged prostate with urinary retention    Special ins

## 2021-02-26 NOTE — TELEPHONE ENCOUNTER
Spouse calling for pt states pt needs something in writing in order to get surgical clearance please advise

## 2021-02-26 NOTE — TELEPHONE ENCOUNTER
RN called patient who is requesting for a surgery schedule. RN spoke to patient and wife. Informed that Linda Corey is not in office today. Will return on Monday. Direct number given.     Patient and wife informed that patient needs medical clearance from Dr Titus You

## 2021-03-01 ENCOUNTER — TELEPHONE (OUTPATIENT)
Dept: SURGERY | Facility: CLINIC | Age: 76
End: 2021-03-01

## 2021-03-01 ENCOUNTER — TELEPHONE (OUTPATIENT)
Dept: CARDIOLOGY | Age: 76
End: 2021-03-01

## 2021-03-01 NOTE — TELEPHONE ENCOUNTER
Patient returned call this date. Patient states he has had a catheter in for over one month and it is unbearable. He is requesting surgery this week. I explained Dr. Go Morales is away this week and next week his surgical schedule is very full.   He is requ

## 2021-03-01 NOTE — TELEPHONE ENCOUNTER
Called patient this date and left message for them to call me back directly at [phone] to schedule procedure and discuss clearance for surgery.

## 2021-03-01 NOTE — TELEPHONE ENCOUNTER
RN called patient in response to patient's concern: \"States he has had a catheter in for over one month and it is unbearable. He is requesting surgery this week. I explained Dr. Prachi Perez is away this week and next week his surgical schedule is very full.

## 2021-03-01 NOTE — TELEPHONE ENCOUNTER
Spoke with patient this date and scheduled surgery for 3/8/21 at 7:00 am at [facility].  Reviewed pre-op. Instructions with patient including need for PCP and cardiac clearance and the need to be off aspirin and Effient.   Patient states he does not ta

## 2021-03-03 PROBLEM — N18.30 CKD (CHRONIC KIDNEY DISEASE) STAGE 3, GFR 30-59 ML/MIN (HCC): Chronic | Status: ACTIVE | Noted: 2021-03-03

## 2021-03-03 PROBLEM — N17.9 ACUTE KIDNEY INJURY (HCC): Status: RESOLVED | Noted: 2021-02-01 | Resolved: 2021-03-03

## 2021-03-03 RX ORDER — ACETAMINOPHEN 500 MG
1000 TABLET ORAL ONCE
Status: CANCELLED | OUTPATIENT
Start: 2021-03-03 | End: 2021-03-03

## 2021-03-04 ENCOUNTER — TELEPHONE (OUTPATIENT)
Dept: SURGERY | Facility: CLINIC | Age: 76
End: 2021-03-04

## 2021-03-04 ENCOUNTER — OFFICE VISIT (OUTPATIENT)
Dept: FAMILY MEDICINE CLINIC | Facility: CLINIC | Age: 76
End: 2021-03-04
Payer: MEDICARE

## 2021-03-04 VITALS
BODY MASS INDEX: 36.08 KG/M2 | DIASTOLIC BLOOD PRESSURE: 60 MMHG | WEIGHT: 252 LBS | RESPIRATION RATE: 16 BRPM | HEART RATE: 80 BPM | SYSTOLIC BLOOD PRESSURE: 108 MMHG | HEIGHT: 70 IN | TEMPERATURE: 97 F

## 2021-03-04 DIAGNOSIS — I25.119 CORONARY ARTERY DISEASE INVOLVING NATIVE CORONARY ARTERY OF NATIVE HEART WITH ANGINA PECTORIS (HCC): ICD-10-CM

## 2021-03-04 DIAGNOSIS — Z01.818 PREOPERATIVE CLEARANCE: ICD-10-CM

## 2021-03-04 DIAGNOSIS — I48.91 ATRIAL FIBRILLATION WITH RAPID VENTRICULAR RESPONSE (HCC): ICD-10-CM

## 2021-03-04 DIAGNOSIS — R33.8 ENLARGED PROSTATE WITH URINARY RETENTION: Primary | ICD-10-CM

## 2021-03-04 DIAGNOSIS — N40.1 ENLARGED PROSTATE WITH URINARY RETENTION: Primary | ICD-10-CM

## 2021-03-04 DIAGNOSIS — I10 ESSENTIAL HYPERTENSION: ICD-10-CM

## 2021-03-04 DIAGNOSIS — N18.31 STAGE 3A CHRONIC KIDNEY DISEASE (HCC): Chronic | ICD-10-CM

## 2021-03-04 DIAGNOSIS — E66.01 SEVERE OBESITY (BMI 35.0-35.9 WITH COMORBIDITY) (HCC): ICD-10-CM

## 2021-03-04 DIAGNOSIS — D69.6 THROMBOCYTOPENIA (HCC): Chronic | ICD-10-CM

## 2021-03-04 PROCEDURE — 3078F DIAST BP <80 MM HG: CPT | Performed by: FAMILY MEDICINE

## 2021-03-04 PROCEDURE — 3008F BODY MASS INDEX DOCD: CPT | Performed by: FAMILY MEDICINE

## 2021-03-04 PROCEDURE — 3074F SYST BP LT 130 MM HG: CPT | Performed by: FAMILY MEDICINE

## 2021-03-04 PROCEDURE — 99214 OFFICE O/P EST MOD 30 MIN: CPT | Performed by: FAMILY MEDICINE

## 2021-03-04 NOTE — ASSESSMENT & PLAN NOTE
Lab Results   Component Value Date/Time    CREATSERUM 1.46 (H) 02/04/2021 01:53 PM    GFR 73 10/24/2017 07:51 AM    GFRNAA 46 (L) 02/04/2021 01:53 PM      Stable continue present management

## 2021-03-04 NOTE — H&P
Mayra Garcia is a 66year old male who presents for a pre-operative physical exam at the request of Dr. Maggie Morse for evaluation of preoperative risk. Patient is to have prostate surgery, to be done by Dr. Maggie Morse  at [facility] on 3/8/2021.     Pt has had withough preoperative order for iv antibiotic surgical site infection prophylaxis.  (1/29/2014); patient documented not to have experienced any of the following events (1/29/2014); injection, w/wo contrast, dx/therapeutic substance, epidural/subarachnoid; l Negative. Negative for dizziness. EXAM:   /60 (BP Location: Left arm)   Pulse 80   Temp 97.2 °F (36.2 °C) (Oral)   Resp 16   Ht 5' 10\" (1.778 m)   Wt 252 lb (114.3 kg)   BMI 36.16 kg/m²  Body mass index is 36.16 kg/m².   Physical Exam   Nursin diaphoretic. No cyanosis or erythema. Nails show no clubbing. Psychiatric: He has a normal mood and affect.  His speech is normal and behavior is normal. Judgment and thought content normal. Cognition and memory are normal.        Results for orders place candidate. This consult was sent back the referring physician, Dr. Hannah Hawkins.          Problem List Items Addressed This Visit        Cardiovascular    Atrial fibrillation with rapid ventricular response (HCC)    Overview     Sotalol 80, prasugel         Curr of procedure and avoid taking benzodiazepines on day of procedure as well. EKG has been ordered. He had a normal stress treadmill in 2019.   If the EKG is normal he should be clear for the procedure    Return in about 2 months (around 5/4/2021), or if sym

## 2021-03-04 NOTE — PATIENT INSTRUCTIONS
You can schedule this by calling Central Scheduling at [phone] or visit the online scheduling site at yourdelivery.     Medication Sig   • Dutasteride 0.5 MG Oral Cap OK to hold day of surgery   • tamsulosin (FLOMAX) cap OK

## 2021-03-04 NOTE — ASSESSMENT & PLAN NOTE
Estimated body mass index is 37.31 kg/m² as calculated from the following:    Height as of 3/3/21: 5' 10\" (1.778 m). Weight as of 3/3/21: 260 lb (117.9 kg).  working on [employer] loss with CKF and CAD

## 2021-03-04 NOTE — ASSESSMENT & PLAN NOTE
Stable, Continue present management.     Blood Pressure and Cardiac Medications          lisinopril 20 MG Oral Tab    Sotalol HCl 80 MG Oral Tab

## 2021-03-05 ENCOUNTER — LAB ENCOUNTER (OUTPATIENT)
Dept: LAB | Facility: HOSPITAL | Age: 76
End: 2021-03-05
Attending: UROLOGY
Payer: MEDICARE

## 2021-03-05 ENCOUNTER — ANESTHESIA EVENT (OUTPATIENT)
Dept: SURGERY | Facility: HOSPITAL | Age: 76
End: 2021-03-05
Payer: MEDICARE

## 2021-03-05 ENCOUNTER — EKG ENCOUNTER (OUTPATIENT)
Dept: LAB | Facility: HOSPITAL | Age: 76
End: 2021-03-05
Attending: UROLOGY
Payer: MEDICARE

## 2021-03-05 DIAGNOSIS — N40.1 ENLARGED PROSTATE WITH URINARY RETENTION: ICD-10-CM

## 2021-03-05 DIAGNOSIS — N40.1 BENIGN PROSTATIC HYPERPLASIA WITH URINARY FREQUENCY: ICD-10-CM

## 2021-03-05 DIAGNOSIS — R33.8 ENLARGED PROSTATE WITH URINARY RETENTION: ICD-10-CM

## 2021-03-05 DIAGNOSIS — R35.0 BENIGN PROSTATIC HYPERPLASIA WITH URINARY FREQUENCY: ICD-10-CM

## 2021-03-05 LAB
ATRIAL RATE: 67 BPM
P AXIS: 19 DEGREES
P-R INTERVAL: 142 MS
Q-T INTERVAL: 426 MS
QRS DURATION: 76 MS
QTC CALCULATION (BEZET): 450 MS
R AXIS: -18 DEGREES
SARS-COV-2 RNA RESP QL NAA+PROBE: NOT DETECTED
T AXIS: -17 DEGREES
VENTRICULAR RATE: 67 BPM

## 2021-03-05 PROCEDURE — 93005 ELECTROCARDIOGRAM TRACING: CPT

## 2021-03-05 PROCEDURE — 93010 ELECTROCARDIOGRAM REPORT: CPT | Performed by: INTERNAL MEDICINE

## 2021-03-07 NOTE — H&P
Patient has an enlarged prostate, approximately 80 cc who has had refractory urinary retention. Catheter had to be reinserted. He has now increasing tamsulosin to twice daily. Even though dutasteride has been previously ordered he has never taken it.  He MG Oral Tab Take 1 tablet (80 mg total) by mouth every 12 (twelve) hours. (Patient taking differently: Take 80 mg by mouth daily. ) 60 tablet 2   • Rosuvastatin Calcium 20 MG Oral Tab Take 1 tablet (20 mg total) by mouth nightly.  30 tablet 5   • Multiple V COATED/DENISSE W/DEL SYS Right 08/25/2019    RCA   • TONSILLECTOMY     • WRIST FRACTURE SURGERY Right       Social History:   Social History   Tobacco Use   Smoking status: Former Smoker   Smokeless tobacco: Never Used   Alcohol use:  Yes   Alcohol/week: 1.0 st [facility]   Needs to discontinue aspirin and Effient therapy preoperatively   Trans urethral resection of the prostate is a surgical procedure which involves surgically removing the enlarged portion of the prostate gland.  It is done through an instru

## 2021-03-07 NOTE — ANESTHESIA PREPROCEDURE EVALUATION
PRE-OP EVALUATION    Patient Name: Sheela Branham    Pre-op Diagnosis: Enlarged prostate with urinary retention [N40.1, R33.8]    Procedure(s):  Cystoscopy, transuretheral resection of prostate/bipolar vaporization    Surgeon(s) and Role:     * Kaylan artery disease involving native coronary artery of native heart with angina pectoris (HCC)  Atrial fibrillation with rapid ventricular response (HCC) Acute posterior anal fissure  Slow transit constipation Hypothyroidism, adult  Acute renal failure (ARF) ( 02/04/2021            Airway      Mallampati: II  Mouth opening: 3 FB  TM distance: 4 - 6 cm  Neck ROM: limited Cardiovascular    Cardiovascular exam normal.         Dental    No notable dental history.          Pulmonary    Pulmonary exam normal.

## 2021-03-08 ENCOUNTER — HOSPITAL ENCOUNTER (OUTPATIENT)
Facility: HOSPITAL | Age: 76
Discharge: HOME | End: 2021-03-10
Attending: UROLOGY | Admitting: UROLOGY
Payer: MEDICARE

## 2021-03-08 ENCOUNTER — ANESTHESIA (OUTPATIENT)
Dept: SURGERY | Facility: HOSPITAL | Age: 76
End: 2021-03-08
Payer: MEDICARE

## 2021-03-08 DIAGNOSIS — N40.1 ENLARGED PROSTATE WITH URINARY RETENTION: Primary | ICD-10-CM

## 2021-03-08 DIAGNOSIS — R33.8 ENLARGED PROSTATE WITH URINARY RETENTION: Primary | ICD-10-CM

## 2021-03-08 PROBLEM — I25.10 CORONARY ARTERY DISEASE INVOLVING NATIVE CORONARY ARTERY OF NATIVE HEART WITHOUT ANGINA PECTORIS: Status: ACTIVE | Noted: 2019-09-04

## 2021-03-08 LAB
ANION GAP SERPL CALC-SCNC: 2 MMOL/L (ref 0–18)
BASOPHILS # BLD AUTO: 0.03 X10(3) UL (ref 0–0.2)
BASOPHILS NFR BLD AUTO: 0.3 %
BUN BLD-MCNC: 13 MG/DL (ref 7–18)
BUN/CREAT SERPL: 11 (ref 10–20)
CALCIUM BLD-MCNC: 8.3 MG/DL (ref 8.5–10.1)
CHLORIDE SERPL-SCNC: 109 MMOL/L (ref 98–112)
CO2 SERPL-SCNC: 26 MMOL/L (ref 21–32)
CREAT BLD-MCNC: 1.18 MG/DL
DEPRECATED RDW RBC AUTO: 50.7 FL (ref 35.1–46.3)
EOSINOPHIL # BLD AUTO: 0.05 X10(3) UL (ref 0–0.7)
EOSINOPHIL NFR BLD AUTO: 0.5 %
ERYTHROCYTE [DISTWIDTH] IN BLOOD BY AUTOMATED COUNT: 14.4 % (ref 11–15)
GLUCOSE BLD-MCNC: 148 MG/DL (ref 70–99)
HCT VFR BLD AUTO: 41.8 %
HGB BLD-MCNC: 13.8 G/DL
IMM GRANULOCYTES # BLD AUTO: 0.03 X10(3) UL (ref 0–1)
IMM GRANULOCYTES NFR BLD: 0.3 %
LYMPHOCYTES # BLD AUTO: 1.1 X10(3) UL (ref 1–4)
LYMPHOCYTES NFR BLD AUTO: 11.9 %
MCH RBC QN AUTO: 31.9 PG (ref 26–34)
MCHC RBC AUTO-ENTMCNC: 33 G/DL (ref 31–37)
MCV RBC AUTO: 96.5 FL
MONOCYTES # BLD AUTO: 0.13 X10(3) UL (ref 0.1–1)
MONOCYTES NFR BLD AUTO: 1.4 %
NEUTROPHILS # BLD AUTO: 7.88 X10 (3) UL (ref 1.5–7.7)
NEUTROPHILS # BLD AUTO: 7.88 X10(3) UL (ref 1.5–7.7)
NEUTROPHILS NFR BLD AUTO: 85.6 %
OSMOLALITY SERPL CALC.SUM OF ELEC: 287 MOSM/KG (ref 275–295)
PLATELET # BLD AUTO: 157 10(3)UL (ref 150–450)
POTASSIUM SERPL-SCNC: 5.4 MMOL/L (ref 3.5–5.1)
RBC # BLD AUTO: 4.33 X10(6)UL
SODIUM SERPL-SCNC: 137 MMOL/L (ref 136–145)
WBC # BLD AUTO: 9.2 X10(3) UL (ref 4–11)

## 2021-03-08 PROCEDURE — 52601 PROSTATECTOMY (TURP): CPT | Performed by: UROLOGY

## 2021-03-08 PROCEDURE — 99214 OFFICE O/P EST MOD 30 MIN: CPT | Performed by: INTERNAL MEDICINE

## 2021-03-08 PROCEDURE — 0V508ZZ DESTRUCTION OF PROSTATE, VIA NATURAL OR ARTIFICIAL OPENING ENDOSCOPIC: ICD-10-PCS | Performed by: UROLOGY

## 2021-03-08 RX ORDER — HYDROCODONE BITARTRATE AND ACETAMINOPHEN 5; 325 MG/1; MG/1
2 TABLET ORAL AS NEEDED
Status: DISCONTINUED | OUTPATIENT
Start: 2021-03-08 | End: 2021-03-08 | Stop reason: HOSPADM

## 2021-03-08 RX ORDER — ATROPA BELLADONNA AND OPIUM 16.2; 6 MG/1; MG/1
1 SUPPOSITORY RECTAL EVERY 6 HOURS PRN
Status: DISCONTINUED | OUTPATIENT
Start: 2021-03-08 | End: 2021-03-10

## 2021-03-08 RX ORDER — MEPERIDINE HYDROCHLORIDE 25 MG/ML
INJECTION INTRAMUSCULAR; INTRAVENOUS; SUBCUTANEOUS
Status: COMPLETED
Start: 2021-03-08 | End: 2021-03-08

## 2021-03-08 RX ORDER — HYDROCODONE BITARTRATE AND ACETAMINOPHEN 5; 325 MG/1; MG/1
1 TABLET ORAL EVERY 4 HOURS PRN
Status: DISCONTINUED | OUTPATIENT
Start: 2021-03-08 | End: 2021-03-10

## 2021-03-08 RX ORDER — MEPERIDINE HYDROCHLORIDE 25 MG/ML
12.5 INJECTION INTRAMUSCULAR; INTRAVENOUS; SUBCUTANEOUS AS NEEDED
Status: COMPLETED | OUTPATIENT
Start: 2021-03-08 | End: 2021-03-08

## 2021-03-08 RX ORDER — LISINOPRIL 20 MG/1
20 TABLET ORAL DAILY
Status: DISCONTINUED | OUTPATIENT
Start: 2021-03-08 | End: 2021-03-10

## 2021-03-08 RX ORDER — HYDRALAZINE HYDROCHLORIDE 20 MG/ML
5 INJECTION INTRAMUSCULAR; INTRAVENOUS EVERY 10 MIN PRN
Status: DISCONTINUED | OUTPATIENT
Start: 2021-03-08 | End: 2021-03-08 | Stop reason: HOSPADM

## 2021-03-08 RX ORDER — ROSUVASTATIN CALCIUM 20 MG/1
20 TABLET, COATED ORAL NIGHTLY
Status: DISCONTINUED | OUTPATIENT
Start: 2021-03-08 | End: 2021-03-10

## 2021-03-08 RX ORDER — MORPHINE SULFATE 2 MG/ML
1 INJECTION, SOLUTION INTRAMUSCULAR; INTRAVENOUS EVERY 2 HOUR PRN
Status: DISCONTINUED | OUTPATIENT
Start: 2021-03-08 | End: 2021-03-10

## 2021-03-08 RX ORDER — HYDROMORPHONE HYDROCHLORIDE 1 MG/ML
0.4 INJECTION, SOLUTION INTRAMUSCULAR; INTRAVENOUS; SUBCUTANEOUS EVERY 5 MIN PRN
Status: DISCONTINUED | OUTPATIENT
Start: 2021-03-08 | End: 2021-03-08 | Stop reason: HOSPADM

## 2021-03-08 RX ORDER — SODIUM CHLORIDE, SODIUM LACTATE, POTASSIUM CHLORIDE, CALCIUM CHLORIDE 600; 310; 30; 20 MG/100ML; MG/100ML; MG/100ML; MG/100ML
INJECTION, SOLUTION INTRAVENOUS CONTINUOUS
Status: DISCONTINUED | OUTPATIENT
Start: 2021-03-08 | End: 2021-03-08 | Stop reason: HOSPADM

## 2021-03-08 RX ORDER — METOCLOPRAMIDE HYDROCHLORIDE 5 MG/ML
10 INJECTION INTRAMUSCULAR; INTRAVENOUS AS NEEDED
Status: DISCONTINUED | OUTPATIENT
Start: 2021-03-08 | End: 2021-03-08 | Stop reason: HOSPADM

## 2021-03-08 RX ORDER — ALPRAZOLAM 1 MG/1
1 TABLET ORAL 2 TIMES DAILY PRN
Status: DISCONTINUED | OUTPATIENT
Start: 2021-03-08 | End: 2021-03-10

## 2021-03-08 RX ORDER — MORPHINE SULFATE 4 MG/ML
4 INJECTION, SOLUTION INTRAMUSCULAR; INTRAVENOUS EVERY 2 HOUR PRN
Status: DISCONTINUED | OUTPATIENT
Start: 2021-03-08 | End: 2021-03-10

## 2021-03-08 RX ORDER — SOTALOL HYDROCHLORIDE 80 MG/1
80 TABLET ORAL DAILY
COMMUNITY

## 2021-03-08 RX ORDER — CEFAZOLIN SODIUM/WATER 2 G/20 ML
2 SYRINGE (ML) INTRAVENOUS ONCE
Status: COMPLETED | OUTPATIENT
Start: 2021-03-08 | End: 2021-03-08

## 2021-03-08 RX ORDER — SODIUM CHLORIDE, SODIUM LACTATE, POTASSIUM CHLORIDE, CALCIUM CHLORIDE 600; 310; 30; 20 MG/100ML; MG/100ML; MG/100ML; MG/100ML
INJECTION, SOLUTION INTRAVENOUS CONTINUOUS
Status: DISCONTINUED | OUTPATIENT
Start: 2021-03-08 | End: 2021-03-10

## 2021-03-08 RX ORDER — ACETAMINOPHEN 325 MG/1
650 TABLET ORAL EVERY 4 HOURS PRN
Status: DISCONTINUED | OUTPATIENT
Start: 2021-03-08 | End: 2021-03-10

## 2021-03-08 RX ORDER — ACETAMINOPHEN 500 MG
1000 TABLET ORAL ONCE AS NEEDED
Status: DISCONTINUED | OUTPATIENT
Start: 2021-03-08 | End: 2021-03-08 | Stop reason: HOSPADM

## 2021-03-08 RX ORDER — HYDRALAZINE HYDROCHLORIDE 20 MG/ML
INJECTION INTRAMUSCULAR; INTRAVENOUS
Status: COMPLETED
Start: 2021-03-08 | End: 2021-03-08

## 2021-03-08 RX ORDER — SOTALOL HYDROCHLORIDE 80 MG/1
80 TABLET ORAL DAILY
Status: DISCONTINUED | OUTPATIENT
Start: 2021-03-09 | End: 2021-03-10

## 2021-03-08 RX ORDER — CEFAZOLIN SODIUM/WATER 2 G/20 ML
2 SYRINGE (ML) INTRAVENOUS EVERY 8 HOURS
Status: COMPLETED | OUTPATIENT
Start: 2021-03-08 | End: 2021-03-08

## 2021-03-08 RX ORDER — HYDROCODONE BITARTRATE AND ACETAMINOPHEN 5; 325 MG/1; MG/1
2 TABLET ORAL EVERY 4 HOURS PRN
Status: DISCONTINUED | OUTPATIENT
Start: 2021-03-08 | End: 2021-03-10

## 2021-03-08 RX ORDER — NALOXONE HYDROCHLORIDE 0.4 MG/ML
80 INJECTION, SOLUTION INTRAMUSCULAR; INTRAVENOUS; SUBCUTANEOUS AS NEEDED
Status: DISCONTINUED | OUTPATIENT
Start: 2021-03-08 | End: 2021-03-08 | Stop reason: HOSPADM

## 2021-03-08 RX ORDER — TAMSULOSIN HYDROCHLORIDE 0.4 MG/1
0.4 CAPSULE ORAL 2 TIMES DAILY
Status: DISCONTINUED | OUTPATIENT
Start: 2021-03-08 | End: 2021-03-10

## 2021-03-08 RX ORDER — HYDROCODONE BITARTRATE AND ACETAMINOPHEN 5; 325 MG/1; MG/1
1 TABLET ORAL AS NEEDED
Status: DISCONTINUED | OUTPATIENT
Start: 2021-03-08 | End: 2021-03-08 | Stop reason: HOSPADM

## 2021-03-08 RX ORDER — ONDANSETRON 2 MG/ML
4 INJECTION INTRAMUSCULAR; INTRAVENOUS AS NEEDED
Status: DISCONTINUED | OUTPATIENT
Start: 2021-03-08 | End: 2021-03-08 | Stop reason: HOSPADM

## 2021-03-08 RX ORDER — LIDOCAINE HYDROCHLORIDE 10 MG/ML
INJECTION, SOLUTION EPIDURAL; INFILTRATION; INTRACAUDAL; PERINEURAL AS NEEDED
Status: DISCONTINUED | OUTPATIENT
Start: 2021-03-08 | End: 2021-03-08 | Stop reason: SURG

## 2021-03-08 RX ORDER — FINASTERIDE 5 MG/1
5 TABLET, FILM COATED ORAL DAILY
Status: DISCONTINUED | OUTPATIENT
Start: 2021-03-08 | End: 2021-03-10

## 2021-03-08 RX ORDER — DEXTROSE, SODIUM CHLORIDE, AND POTASSIUM CHLORIDE 5; .45; .15 G/100ML; G/100ML; G/100ML
INJECTION INTRAVENOUS CONTINUOUS
Status: DISCONTINUED | OUTPATIENT
Start: 2021-03-08 | End: 2021-03-08

## 2021-03-08 RX ORDER — MORPHINE SULFATE 2 MG/ML
2 INJECTION, SOLUTION INTRAMUSCULAR; INTRAVENOUS EVERY 2 HOUR PRN
Status: DISCONTINUED | OUTPATIENT
Start: 2021-03-08 | End: 2021-03-10

## 2021-03-08 RX ORDER — ONDANSETRON 2 MG/ML
INJECTION INTRAMUSCULAR; INTRAVENOUS AS NEEDED
Status: DISCONTINUED | OUTPATIENT
Start: 2021-03-08 | End: 2021-03-08 | Stop reason: SURG

## 2021-03-08 RX ORDER — LABETALOL HYDROCHLORIDE 5 MG/ML
5 INJECTION, SOLUTION INTRAVENOUS EVERY 5 MIN PRN
Status: DISCONTINUED | OUTPATIENT
Start: 2021-03-08 | End: 2021-03-08 | Stop reason: HOSPADM

## 2021-03-08 RX ORDER — DEXAMETHASONE SODIUM PHOSPHATE 4 MG/ML
VIAL (ML) INJECTION AS NEEDED
Status: DISCONTINUED | OUTPATIENT
Start: 2021-03-08 | End: 2021-03-08 | Stop reason: SURG

## 2021-03-08 RX ORDER — MIDAZOLAM HYDROCHLORIDE 1 MG/ML
1 INJECTION INTRAMUSCULAR; INTRAVENOUS EVERY 5 MIN PRN
Status: DISCONTINUED | OUTPATIENT
Start: 2021-03-08 | End: 2021-03-08 | Stop reason: HOSPADM

## 2021-03-08 RX ORDER — DEXTROSE AND SODIUM CHLORIDE 5; .45 G/100ML; G/100ML
INJECTION, SOLUTION INTRAVENOUS CONTINUOUS
Status: DISCONTINUED | OUTPATIENT
Start: 2021-03-08 | End: 2021-03-10

## 2021-03-08 RX ORDER — HYDROMORPHONE HYDROCHLORIDE 1 MG/ML
INJECTION, SOLUTION INTRAMUSCULAR; INTRAVENOUS; SUBCUTANEOUS
Status: COMPLETED
Start: 2021-03-08 | End: 2021-03-08

## 2021-03-08 RX ORDER — DIAZEPAM 5 MG/1
5 TABLET ORAL EVERY 12 HOURS PRN
Status: DISCONTINUED | OUTPATIENT
Start: 2021-03-08 | End: 2021-03-10

## 2021-03-08 RX ADMIN — DEXAMETHASONE SODIUM PHOSPHATE 4 MG: 4 MG/ML VIAL (ML) INJECTION at 07:11:00

## 2021-03-08 RX ADMIN — ONDANSETRON 4 MG: 2 INJECTION INTRAMUSCULAR; INTRAVENOUS at 07:14:00

## 2021-03-08 RX ADMIN — LIDOCAINE HYDROCHLORIDE 50 MG: 10 INJECTION, SOLUTION EPIDURAL; INFILTRATION; INTRACAUDAL; PERINEURAL at 07:11:00

## 2021-03-08 RX ADMIN — CEFAZOLIN SODIUM/WATER 2 G: 2 G/20 ML SYRINGE (ML) INTRAVENOUS at 07:14:00

## 2021-03-08 NOTE — INTERVAL H&P NOTE
Pre-op Diagnosis: Enlarged prostate with urinary retention [N40.1, R33.8]    The above referenced H&P was reviewed by Larkin Ahumada, MD on 3/8/2021, the patient was examined and no significant changes have occurred in the patient's condition since the H

## 2021-03-08 NOTE — PLAN OF CARE
Patient A/O X 4, forgetful. Wife at bedside. VSS, IVF infusing. CBI draining cherry colored urine, infusing at moderate rate. Patient provided with clear liquid menu. Denies pain at this time. SCDs in place, orders for bedrest maintained.      Problem: EDGARD POLST form as appropriate  - Assess patient's ability to be responsible for managing their own health  - Refer to Case Management Department for coordinating discharge planning if the patient needs post-hospital services based on physician/LIP order or com

## 2021-03-08 NOTE — CONSULTS
[facility]  CONSULT     Issa Pineda Patient Status:  Outpatient in a Bed    1945 MRN [id number]   [facility] 3SW-A Attending Bia Roman MD   Hosp Day # 0 PCP Jessica Gorman MD     Reason for consult: medical m increased over past two years   • Visual impairment     glasses   • Weight gain         Past Surgical History:   Past Surgical History:   Procedure Laterality Date   • ANGIOGRAM     • ANGIOPLASTY (CORONARY)     • APPENDECTOMY      as child   • BACK SURG total) by mouth 2 (two) times a day. Take 1/2 hour following the same meal each day, Disp: 180 capsule, Rfl: 3  Prasugrel HCl 10 MG Oral Tab, Take 10 mg by mouth daily. , Disp: , Rfl:   lisinopril 20 MG Oral Tab, Take 1 tablet (20 mg total) by mouth daily. , 1. 18   GFRAA 68   GFRNAA 59*   CA 8.3*      K 5.4*      CO2 26.0       No results for input(s): PTP, INR in the last 168 hours. No results for input(s): TROP, CK in the last 168 hours. Imaging: Imaging data reviewed in Epic.       ASSESSME

## 2021-03-08 NOTE — OPERATIVE REPORT
Operative Note    Patient Name: Allyson Velazquez    Date of Procedure: 3/8/2021    Preoperative Diagnosis: Enlarged prostate with urinary retention [N40.1, R33.8]    Postoperative Diagnosis: Enlarged prostate with urinary retention [N40.1, R33.8] lateral lobe hyperplasia and was approximately 4-1/2 cm long. Examination of the bladder was found to show some trabeculation of the musculature. Then the urethra was dilated with [place] sounds to [address] at which time drainage became bloody.   The 2

## 2021-03-08 NOTE — ANESTHESIA POSTPROCEDURE EVALUATION
[address] Patient Status:  Outpatient in a Bed   Age/Gender 66year old male MRN [id number]   Location [address] Attending Glenn Ochoa MD   Hosp Day # 0 PCP MD Willie Oro

## 2021-03-08 NOTE — PROGRESS NOTES
NURSING ADMISSION NOTE      Patient admitted via Bed. Oriented to room. Safety precautions initiated. Bed in low position. Call light in reach. Wife at bedside.

## 2021-03-08 NOTE — ANESTHESIA PROCEDURE NOTES
Airway  Date/Time: 3/8/2021 7:13 AM  Urgency: elective    Airway not difficult    General Information and Staff    Patient location during procedure: OR  Anesthesiologist: Mini Meade MD  Performed: anesthesiologist     Indications and Patient Bill

## 2021-03-09 ENCOUNTER — APPOINTMENT (OUTPATIENT)
Dept: ULTRASOUND IMAGING | Facility: HOSPITAL | Age: 76
End: 2021-03-09
Attending: UROLOGY
Payer: MEDICARE

## 2021-03-09 LAB
ANION GAP SERPL CALC-SCNC: 2 MMOL/L (ref 0–18)
BUN BLD-MCNC: 13 MG/DL (ref 7–18)
BUN/CREAT SERPL: 12.5 (ref 10–20)
CALCIUM BLD-MCNC: 8 MG/DL (ref 8.5–10.1)
CHLORIDE SERPL-SCNC: 109 MMOL/L (ref 98–112)
CO2 SERPL-SCNC: 28 MMOL/L (ref 21–32)
CREAT BLD-MCNC: 1.04 MG/DL
DEPRECATED RDW RBC AUTO: 50.2 FL (ref 35.1–46.3)
ERYTHROCYTE [DISTWIDTH] IN BLOOD BY AUTOMATED COUNT: 14.4 % (ref 11–15)
GLUCOSE BLD-MCNC: 159 MG/DL (ref 70–99)
HCT VFR BLD AUTO: 34 %
HGB BLD-MCNC: 11.3 G/DL
MCH RBC QN AUTO: 32.3 PG (ref 26–34)
MCHC RBC AUTO-ENTMCNC: 33.2 G/DL (ref 31–37)
MCV RBC AUTO: 97.1 FL
OSMOLALITY SERPL CALC.SUM OF ELEC: 291 MOSM/KG (ref 275–295)
PLATELET # BLD AUTO: 123 10(3)UL (ref 150–450)
POTASSIUM SERPL-SCNC: 4.5 MMOL/L (ref 3.5–5.1)
RBC # BLD AUTO: 3.5 X10(6)UL
SODIUM SERPL-SCNC: 139 MMOL/L (ref 136–145)
WBC # BLD AUTO: 8.7 X10(3) UL (ref 4–11)

## 2021-03-09 PROCEDURE — 76857 US EXAM PELVIC LIMITED: CPT | Performed by: UROLOGY

## 2021-03-09 PROCEDURE — 99214 OFFICE O/P EST MOD 30 MIN: CPT | Performed by: INTERNAL MEDICINE

## 2021-03-09 PROCEDURE — 99024 POSTOP FOLLOW-UP VISIT: CPT | Performed by: UROLOGY

## 2021-03-09 NOTE — PROGRESS NOTES
[facility]  Progress Note     Josh Hilliard Patient Status:  Outpatient in a Bed    1945 MRN [id number]   [facility] 3NW-A Attending Marlee Wade MD   Hosp Day # 0 PCP Eladio Carvajal MD     Reason for consult: medic the last 168 hours. No results for input(s): TROP, CK in the last 168 hours. Imaging: Imaging data reviewed in Epic.     Medications:   • finasteride  5 mg Oral Daily   • lisinopril  20 mg Oral Daily   • Rosuvastatin Calcium  20 mg Oral Nightly

## 2021-03-09 NOTE — PLAN OF CARE
A&Ox4. VSS. RA. . No telemetry ordered. No cardiac symptoms. GI: Abdomen soft, nondistended. Denies passing gas. Denies nausea at this time. : CBI going. Able to slow it down to a moderate rate--appears pink and clear at this time.  Vazquez taped to patient/family/discharge partner in discharge planning  - Arrange for needed discharge resources and transportation as appropriate  - Identify discharge learning needs (meds, wound care, etc)  - Arrange for interpreters to assist at discharge as needed  -

## 2021-03-09 NOTE — PROGRESS NOTES
Urine is draining very light pink. Complaining of pain in penis suprapubic abdominal pain. Taking Norco and even morphine. Abdomen is slightly distended. Had large bowel movement earlier. Vital signs are stable. Laboratory stable.   Some slight suprap

## 2021-03-09 NOTE — PROGRESS NOTES
Dr Nimisha Zhu notified of ultrasound results and leaking around catheter site. Orders received to irrigate bladder. Bladder gently irrigated with 100mL NS, 100mL returned with multiple medium sized clots present.  Will continue to monitor for leaking around ca

## 2021-03-09 NOTE — PLAN OF CARE
Patient A/O X 4, forgetful at times. VSS. IVF infusing per orders, tolerating soft diet. CBI infusing at slow rate, increased to moderate rate due to cherry colored urine with small amount of drainage around penis. Small clot seen in miller tubing.  Patient interpreters to assist at discharge as needed  - Consider post-discharge preferences of patient/family/discharge partner  - Complete POLST form as appropriate  - Assess patient's ability to be responsible for managing their own health  - Refer to [facility]

## 2021-03-10 ENCOUNTER — TELEPHONE (OUTPATIENT)
Dept: FAMILY MEDICINE CLINIC | Facility: CLINIC | Age: 76
End: 2021-03-10

## 2021-03-10 VITALS
BODY MASS INDEX: 35.98 KG/M2 | HEART RATE: 64 BPM | SYSTOLIC BLOOD PRESSURE: 127 MMHG | WEIGHT: 251.31 LBS | RESPIRATION RATE: 20 BRPM | DIASTOLIC BLOOD PRESSURE: 50 MMHG | TEMPERATURE: 98 F | HEIGHT: 70 IN | OXYGEN SATURATION: 97 %

## 2021-03-10 PROCEDURE — 99213 OFFICE O/P EST LOW 20 MIN: CPT | Performed by: INTERNAL MEDICINE

## 2021-03-10 PROCEDURE — 99024 POSTOP FOLLOW-UP VISIT: CPT | Performed by: UROLOGY

## 2021-03-10 NOTE — DISCHARGE SUMMARY
The patient was admitted into the hospital on March 8, 2021 underwent transurethral resection of the prostate uneventfully. Postoperatively patient did have some complaints of bladder spasm. Catheter was irrigated for few small clots.   Urine was light pi

## 2021-03-10 NOTE — PROGRESS NOTES
Urine is light pink this morning. Patient comfortable. Bladder was filled with small amount of irrigant and the catheter removed.     Recommend: Timed voiding every 2-3 hours  Okay to stand  Home this afternoon hold anticoagulant therapy and aspirin until

## 2021-03-10 NOTE — PROGRESS NOTES
[facility]  Progress Note     Fco Shaw Patient Status:  Outpatient in a Bed    1945 MRN [id number]   [facility] 3NW-A Attending Anton Lutz MD   Hosp Day # 0 PCP Dinora Calvillo MD     Reason for consult: medic in the last 168 hours. Imaging: Imaging data reviewed in Epic.     Medications:   • finasteride  5 mg Oral Daily   • lisinopril  20 mg Oral Daily   • Rosuvastatin Calcium  20 mg Oral Nightly   • Sotalol HCl  80 mg Oral Daily   • tamsulosin HCl  0.4

## 2021-03-10 NOTE — PLAN OF CARE
Pt ax4 VSS, pt lung sounds are clear, bowel sounds present, abdomen distended round and soft, pt denies any pain. CBI running slow rate, draining light reddish urine.   Pt was taking his own medication he brought from home, called MD and updated what he was Identify discharge learning needs (meds, wound care, etc)  - Arrange for interpreters to assist at discharge as needed  - Consider post-discharge preferences of patient/family/discharge partner  - Complete POLST form as appropriate  - Assess patient's abil

## 2021-03-10 NOTE — PHYSICAL THERAPY NOTE
PHYSICAL THERAPY EVALUATION - INPATIENT     Room Number: 315/315-A  Evaluation Date: 3/10/2021  Type of Evaluation: Initial  Physician Order: PT Eval and Treat    Presenting Problem: s/p TURP 3/8/21  Reason for Therapy: Mobility Dysfunction and Disch Jos Thornton MD at [facility] MAIN OR   • ESOPHAGOGASTRODUODENOSCOPY (EGD) N/A 4/19/2018    Performed by Anabel Matias MD at [facility] ENDOSCOPY   • KNEE SURGERY Left     ACL   • LUMBAR EPIDURAL N/A 3/11/2014    Performed by Ashley Collins MD at [address] [place] [address]  bedclothes, sheets and blankets)?: None   -   Sitting down on and standing up from a chair with arms (e.g., wheelchair, bedside commode, etc.): None   -   Moving from lying on back to sitting on the side of the bed?: None   How much help from another perso cardiopulmonary endurance, decreased balance, decreased strength, decreased safety/insight. Functional outcome measures completed include AMPAC.   Based on this evaluation, patient's clinical presentation is stable and overall the evaluation complexity is

## 2021-03-10 NOTE — CM/SW NOTE
65 yo admitted OPIB after cysto, TURP/bipolar vaporization. PT eval with recommendations for home health.    HOME SITUATION  Type of Home: House   Home Layout: Two level  Stairs to Enter : 2  Stairs to Bedroom: 15     Lives With: Spouse  Patient Owned Eq

## 2021-03-10 NOTE — HOME CARE LIAISON
TNL rec'd referral from Lamar Martinez CM to offer choice and HH on dc    TNL met with ptnt at bedside Ptnt was on the phone to arrange transportation to dc home. I introduced myself and ptnt stated he didn't have time right now to talk.  TNL asked if I c

## 2021-03-10 NOTE — PLAN OF CARE
Assumed care for this patient at 0730: patient alert and oriented. Anxious at times. Prn xanax given. Complains of pain abdomen 3/10. Once miller discontinued. Pain improved. 200 of urine output. Tolerating diet and ambulated in benavidez.  Plan for discharge ho patient/family/discharge partner  - Complete POLST form as appropriate  - Assess patient's ability to be responsible for managing their own health  - Refer to Case Management Department for coordinating discharge planning if the patient needs post-hospital

## 2021-03-12 ENCOUNTER — TELEPHONE (OUTPATIENT)
Dept: SURGERY | Facility: CLINIC | Age: 76
End: 2021-03-12

## 2021-03-12 NOTE — TELEPHONE ENCOUNTER
Pt called stating pt had surgery on 3-8-21. Advised to schedule post op appointment on 3-18-21. No appointments available.   Pt aware office closes today at 3:00pm and will return on Monday 3-15-21 at 8:00am.  Please call pt

## 2021-03-12 NOTE — TELEPHONE ENCOUNTER
This RN called patient in response to his request for a follow up appointment. Note, he is a S/P Cystoscopy, transuretheral resection of prostate/bipolar vaporization on 3/8. He is scheduled on 3/18 Thursday at 3:30pm for follow up with Dr Chelle Shepard.  All

## 2021-03-13 NOTE — PROGRESS NOTES
Your recent tur prostate is benicgn. Recommend follow up in the office as directed.     Sincerely,  Felipa Cardona MD

## 2021-03-18 ENCOUNTER — OFFICE VISIT (OUTPATIENT)
Dept: SURGERY | Facility: CLINIC | Age: 76
End: 2021-03-18
Payer: MEDICARE

## 2021-03-18 VITALS — TEMPERATURE: 96 F | SYSTOLIC BLOOD PRESSURE: 106 MMHG | HEART RATE: 66 BPM | DIASTOLIC BLOOD PRESSURE: 67 MMHG

## 2021-03-18 DIAGNOSIS — N40.1 BENIGN PROSTATIC HYPERPLASIA WITH URINARY FREQUENCY: ICD-10-CM

## 2021-03-18 DIAGNOSIS — N30.01 ACUTE CYSTITIS WITH HEMATURIA: ICD-10-CM

## 2021-03-18 DIAGNOSIS — R35.0 BENIGN PROSTATIC HYPERPLASIA WITH URINARY FREQUENCY: ICD-10-CM

## 2021-03-18 DIAGNOSIS — R82.90 URINE FINDING: Primary | ICD-10-CM

## 2021-03-18 LAB
BILIRUB UR QL: NEGATIVE
GLUCOSE UR-MCNC: NEGATIVE MG/DL
KETONES UR-MCNC: NEGATIVE MG/DL
MULTISTIX LOT#: 5077 NUMERIC
NITRITE UR QL STRIP.AUTO: NEGATIVE
NITRITE, URINE: POSITIVE
PH UR: 6 [PH] (ref 5–8)
PH, URINE: 6 (ref 4.5–8)
PROT UR-MCNC: 100 MG/DL
PROTEIN (URINE DIPSTICK): >=300 MG/DL
RBC #/AREA URNS AUTO: >10 /HPF
SP GR UR STRIP: 1.01 (ref 1–1.03)
SPECIFIC GRAVITY: 1.02 (ref 1–1.03)
UROBILINOGEN UR STRIP-ACNC: <2
UROBILINOGEN,SEMI-QN: 0.2 MG/DL (ref 0–1.9)
WBC #/AREA URNS AUTO: >50 /HPF

## 2021-03-18 PROCEDURE — 81003 URINALYSIS AUTO W/O SCOPE: CPT | Performed by: UROLOGY

## 2021-03-18 PROCEDURE — 1111F DSCHRG MED/CURRENT MED MERGE: CPT | Performed by: UROLOGY

## 2021-03-18 PROCEDURE — 99024 POSTOP FOLLOW-UP VISIT: CPT | Performed by: UROLOGY

## 2021-03-18 RX ORDER — CIPROFLOXACIN 500 MG/1
500 TABLET, FILM COATED ORAL 2 TIMES DAILY
Qty: 20 TABLET | Refills: 1 | Status: SHIPPED | OUTPATIENT
Start: 2021-03-18 | End: 2021-07-30 | Stop reason: ALTCHOICE

## 2021-03-18 NOTE — PROGRESS NOTES
Rooming Clinician:     Lorna Camargo is a 66year old male. Patient presents with:  Surgical Followup: s/p TURP 3/8/21. c/o dysuria        HPI:     Patient voiding frequently with burning and discomfort in the penis. Had TURP last week.   Tissue was • Irregular bowel habits    • Morbid obesity with BMI of 40.0-44.9, adult (HCC)    • Night sweats    • Obesity    • Problems with swallowing     congestion in throat, ears.  (has seen ENT, no dx)   • Shortness of breath     increased over past two years well developed, well nourished,in no apparent distress  SKIN: no rashes,no suspicious lesions  HEENT: atraumatic, normocephalic,ears and throat are clear  NECK: supple  LUNGS: normal respiratory motion without distress  CARDIO: normal peripheral perfusion

## 2021-03-25 ENCOUNTER — OFFICE VISIT (OUTPATIENT)
Dept: SURGERY | Facility: CLINIC | Age: 76
End: 2021-03-25
Payer: MEDICARE

## 2021-03-25 DIAGNOSIS — R33.9 RETENTION OF URINE: ICD-10-CM

## 2021-03-25 DIAGNOSIS — R35.0 BENIGN PROSTATIC HYPERPLASIA WITH URINARY FREQUENCY: ICD-10-CM

## 2021-03-25 DIAGNOSIS — R30.0 DYSURIA: ICD-10-CM

## 2021-03-25 DIAGNOSIS — N40.1 BENIGN PROSTATIC HYPERPLASIA WITH URINARY FREQUENCY: ICD-10-CM

## 2021-03-25 DIAGNOSIS — R82.90 URINE FINDING: Primary | ICD-10-CM

## 2021-03-25 LAB
APPEARANCE: CLEAR
MULTISTIX LOT#: 5077 NUMERIC
PH, URINE: 5.5 (ref 4.5–8)
PROTEIN (URINE DIPSTICK): 300 MG/DL
SPECIFIC GRAVITY: 1.03 (ref 1–1.03)
URINE-COLOR: YELLOW
UROBILINOGEN,SEMI-QN: 0.2 MG/DL (ref 0–1.9)

## 2021-03-25 PROCEDURE — 81003 URINALYSIS AUTO W/O SCOPE: CPT | Performed by: UROLOGY

## 2021-03-25 PROCEDURE — 99024 POSTOP FOLLOW-UP VISIT: CPT | Performed by: UROLOGY

## 2021-03-25 NOTE — PROGRESS NOTES
Rooming Clinician:     Enrike Roberts is a 66year old male. Patient presents with: Follow - Up: s/p TURP        HPI:     Stream is getting better. Still persistent dysuria. Bladder scan is 0.   Urine culture last week was negative although finishin bowel patterns (takes miralax)   • Irregular bowel habits    • Morbid obesity with BMI of 40.0-44.9, adult (HCC)    • Night sweats    • Obesity    • Problems with swallowing     congestion in throat, ears.  (has seen ENT, no dx)   • Shortness of breath this visit.   GENERAL: well developed, well nourished,in no apparent distress  SKIN: no rashes,no suspicious lesions  HEENT: atraumatic, normocephalic,ears and throat are clear  NECK: supple  LUNGS: normal respiratory motion without distress  CARDIO: normal

## 2021-04-21 DIAGNOSIS — F43.10 PTSD (POST-TRAUMATIC STRESS DISORDER): ICD-10-CM

## 2021-04-21 DIAGNOSIS — G47.00 INSOMNIA, UNSPECIFIED TYPE: ICD-10-CM

## 2021-04-21 NOTE — TELEPHONE ENCOUNTER
Patient is looking for refill request   diazepam 5 MG Oral Tab and the   ALPRAZolam 1 MG Oral Tab 60 tablet 5 9/1/2020     Sig - Route:  Take 1 tablet       He has to days left and needs refills pharmacy has send request patients states

## 2021-04-22 RX ORDER — DIAZEPAM 5 MG/1
5 TABLET ORAL NIGHTLY PRN
Qty: 30 TABLET | Refills: 5 | Status: SHIPPED | OUTPATIENT
Start: 2021-04-22 | End: 2021-10-25

## 2021-04-22 NOTE — TELEPHONE ENCOUNTER
Patient called to check status of refill, he is now out of medication, would like to know if medication will be refilled, would like a call back

## 2021-04-23 DIAGNOSIS — G47.01 INSOMNIA DUE TO MEDICAL CONDITION: ICD-10-CM

## 2021-04-23 DIAGNOSIS — F43.10 PTSD (POST-TRAUMATIC STRESS DISORDER): ICD-10-CM

## 2021-04-23 RX ORDER — ALPRAZOLAM 1 MG/1
1 TABLET ORAL 2 TIMES DAILY PRN
Qty: 60 TABLET | Refills: 5 | Status: SHIPPED | OUTPATIENT
Start: 2021-04-23 | End: 2021-10-25

## 2021-06-24 ENCOUNTER — TELEPHONE (OUTPATIENT)
Dept: FAMILY MEDICINE CLINIC | Facility: CLINIC | Age: 76
End: 2021-06-24

## 2021-06-24 ENCOUNTER — TELEPHONE (OUTPATIENT)
Dept: CASE MANAGEMENT | Age: 76
End: 2021-06-24

## 2021-06-24 NOTE — TELEPHONE ENCOUNTER
Patient is calling he has questions regarding having his Covid Vaccine. He just had prostate surgery and they said they wanted him to speak with his PCP regarding the possible side affects after the surgery. He would like Dr. Jeff Alvarenga to call him back.   He d

## 2021-07-06 ENCOUNTER — TELEPHONE (OUTPATIENT)
Dept: FAMILY MEDICINE CLINIC | Facility: CLINIC | Age: 76
End: 2021-07-06

## 2021-07-06 NOTE — TELEPHONE ENCOUNTER
Pt would like to know if it's safe to get the COVID vaccine with his medical hx. He is scheduled to get it tomorrow at noon.  Please advise

## 2021-07-06 NOTE — TELEPHONE ENCOUNTER
I don't see any reason he would not be able to. We have an appt tomorrow afternoon. Can we warn him about the [facility]?

## 2021-07-13 ENCOUNTER — TELEPHONE (OUTPATIENT)
Dept: FAMILY MEDICINE CLINIC | Facility: CLINIC | Age: 76
End: 2021-07-13

## 2021-07-21 ENCOUNTER — TELEPHONE (OUTPATIENT)
Dept: CARDIOLOGY | Age: 76
End: 2021-07-21

## 2021-07-21 ENCOUNTER — TELEPHONE (OUTPATIENT)
Dept: FAMILY MEDICINE CLINIC | Facility: CLINIC | Age: 76
End: 2021-07-21

## 2021-07-21 DIAGNOSIS — N40.1 BPH WITH OBSTRUCTION/LOWER URINARY TRACT SYMPTOMS: ICD-10-CM

## 2021-07-21 DIAGNOSIS — N13.8 BPH WITH OBSTRUCTION/LOWER URINARY TRACT SYMPTOMS: ICD-10-CM

## 2021-07-21 NOTE — TELEPHONE ENCOUNTER
Pt is scheduled for first round of COVID vaccine and wants to know if it's recommended he has it with all the medications he is on.

## 2021-07-21 NOTE — TELEPHONE ENCOUNTER
Spoke with pt. Answered his COVID vaccine questions, and he will proceed with getting his vaccine today.

## 2021-07-23 RX ORDER — DUTASTERIDE 0.5 MG/1
CAPSULE, LIQUID FILLED ORAL
Qty: 90 CAPSULE | Refills: 1 | Status: SHIPPED | OUTPATIENT
Start: 2021-07-23 | End: 2021-07-30

## 2021-07-23 RX ORDER — DUTASTERIDE 0.5 MG/1
CAPSULE, LIQUID FILLED ORAL
Qty: 90 CAPSULE | Refills: 1 | Status: SHIPPED | OUTPATIENT
Start: 2021-07-23

## 2021-07-23 NOTE — TELEPHONE ENCOUNTER
Pt calling in refill for Dutasteride 0.5 MG Oral Cap. Pt asking if medication can be sent before 1pm today.  Please advise

## 2021-07-23 NOTE — TELEPHONE ENCOUNTER
LOV 3/25/21.  RX refilled per protocol      Benign Prostatic Hypertrophy Medications      Protocol Criteria:  • Appointment scheduled in the past 12 months or in the next 2 months  • If patients is between the ages of 48 and 79 then PSA done within the la

## 2021-07-26 ENCOUNTER — TELEPHONE (OUTPATIENT)
Dept: CARDIOLOGY | Age: 76
End: 2021-07-26

## 2021-07-27 ENCOUNTER — TELEPHONE (OUTPATIENT)
Dept: SURGERY | Facility: CLINIC | Age: 76
End: 2021-07-27

## 2021-07-27 ENCOUNTER — TELEPHONE (OUTPATIENT)
Dept: FAMILY MEDICINE CLINIC | Facility: CLINIC | Age: 76
End: 2021-07-27

## 2021-07-27 DIAGNOSIS — R33.9 RETENTION OF URINE, UNSPECIFIED: Primary | ICD-10-CM

## 2021-07-27 NOTE — TELEPHONE ENCOUNTER
This RN called patient in response to his call:     \"Per pt he canceled his follow up 4/26 and is scheduled for 8/27. Per pt asking if he can be seen sooner. Please advise\"    NO answer. Left message to call back office.  RN to offer 7/30, Friday 9:45AM

## 2021-07-27 NOTE — TELEPHONE ENCOUNTER
Per pt he canceled his follow up 4/26 and is scheduled for 8/27. Per pt asking if he can be seen sooner.  Please advise

## 2021-07-27 NOTE — TELEPHONE ENCOUNTER
This RN called patient again to offer sooner appt. He is agreeable to reschedule his appt to 8/2. All questions answered.

## 2021-07-27 NOTE — TELEPHONE ENCOUNTER
Referral request Dr. Brandon Valdez M.D.     Six visits    Diagnosis: R33.9    Auth per [facility] [id number] valid 7/27 to 1/23/2021

## 2021-07-30 ENCOUNTER — TELEPHONE (OUTPATIENT)
Dept: FAMILY MEDICINE CLINIC | Facility: CLINIC | Age: 76
End: 2021-07-30

## 2021-07-30 NOTE — PROGRESS NOTES
Patient presents with:  Complete Form: Disability forms     HPI:   Rc Gonzalez is a 64year old male who presents to the office for disability discussion, prostate. Still suffering from bad fatigue and sweating.   It is unclear what continues to cau

## 2021-07-30 NOTE — TELEPHONE ENCOUNTER
Left message with  to call back with fax number to fax- Worker's Compensation forms. A copy has been mailed out as well.

## 2021-08-02 ENCOUNTER — OFFICE VISIT (OUTPATIENT)
Dept: SURGERY | Facility: CLINIC | Age: 76
End: 2021-08-02
Payer: MEDICARE

## 2021-08-02 ENCOUNTER — HOSPITAL ENCOUNTER (OUTPATIENT)
Dept: LAB | Facility: HOSPITAL | Age: 76
Discharge: HOME | End: 2021-08-02
Attending: INTERNAL MEDICINE
Payer: MEDICARE

## 2021-08-02 DIAGNOSIS — R82.90 URINE FINDING: Primary | ICD-10-CM

## 2021-08-02 DIAGNOSIS — R35.0 BENIGN PROSTATIC HYPERPLASIA WITH URINARY FREQUENCY: ICD-10-CM

## 2021-08-02 DIAGNOSIS — N40.1 BENIGN PROSTATIC HYPERPLASIA WITH URINARY FREQUENCY: ICD-10-CM

## 2021-08-02 LAB
ALBUMIN SERPL-MCNC: 3.8 G/DL (ref 3.4–5)
ALBUMIN/GLOB SERPL: 1.1 {RATIO} (ref 1–2)
ALP LIVER SERPL-CCNC: 77 U/L
ALT SERPL-CCNC: 53 U/L
ANION GAP SERPL CALC-SCNC: 10 MMOL/L (ref 0–18)
APPEARANCE: CLEAR
AST SERPL-CCNC: 45 U/L (ref 15–37)
BASOPHILS # BLD AUTO: 0.05 X10(3) UL (ref 0–0.2)
BASOPHILS NFR BLD AUTO: 0.7 %
BILIRUB SERPL-MCNC: 0.6 MG/DL (ref 0.1–2)
BILIRUBIN: NEGATIVE
BUN BLD-MCNC: 14 MG/DL (ref 7–18)
CALCIUM BLD-MCNC: 8.9 MG/DL (ref 8.5–10.1)
CHLORIDE SERPL-SCNC: 109 MMOL/L (ref 98–112)
CHOLEST SMN-MCNC: 147 MG/DL (ref ?–200)
CO2 SERPL-SCNC: 22 MMOL/L (ref 21–32)
CREAT BLD-MCNC: 1.27 MG/DL
EOSINOPHIL # BLD AUTO: 0.15 X10(3) UL (ref 0–0.7)
EOSINOPHIL NFR BLD AUTO: 2.2 %
ERYTHROCYTE [DISTWIDTH] IN BLOOD BY AUTOMATED COUNT: 15.5 %
EST. AVERAGE GLUCOSE BLD GHB EST-MCNC: 108 MG/DL (ref 68–126)
GLOBULIN PLAS-MCNC: 3.5 G/DL (ref 2.8–4.4)
GLUCOSE (URINE DIPSTICK): NEGATIVE MG/DL
GLUCOSE BLD-MCNC: 124 MG/DL (ref 70–99)
HBA1C MFR BLD HPLC: 5.4 % (ref ?–5.7)
HCT VFR BLD AUTO: 42.8 %
HDLC SERPL-MCNC: 46 MG/DL (ref 40–59)
HGB BLD-MCNC: 15 G/DL
IMM GRANULOCYTES # BLD AUTO: 0.01 X10(3) UL (ref 0–1)
IMM GRANULOCYTES NFR BLD: 0.1 %
LDLC SERPL CALC-MCNC: 58 MG/DL (ref ?–100)
LEUKOCYTES: NEGATIVE
LYMPHOCYTES # BLD AUTO: 1.4 X10(3) UL (ref 1–4)
LYMPHOCYTES NFR BLD AUTO: 20.3 %
M PROTEIN MFR SERPL ELPH: 7.3 G/DL (ref 6.4–8.2)
MCH RBC QN AUTO: 35.3 PG (ref 26–34)
MCHC RBC AUTO-ENTMCNC: 35 G/DL (ref 31–37)
MCV RBC AUTO: 100.7 FL
MONOCYTES # BLD AUTO: 0.43 X10(3) UL (ref 0.1–1)
MONOCYTES NFR BLD AUTO: 6.3 %
MULTISTIX LOT#: ABNORMAL NUMERIC
NEUTROPHILS # BLD AUTO: 4.84 X10 (3) UL (ref 1.5–7.7)
NEUTROPHILS # BLD AUTO: 4.84 X10(3) UL (ref 1.5–7.7)
NEUTROPHILS NFR BLD AUTO: 70.4 %
NITRITE, URINE: NEGATIVE
NONHDLC SERPL-MCNC: 101 MG/DL (ref ?–130)
OCCULT BLOOD: NEGATIVE
OSMOLALITY SERPL CALC.SUM OF ELEC: 294 MOSM/KG (ref 275–295)
PH, URINE: 5 (ref 4.5–8)
PLATELET # BLD AUTO: 141 10(3)UL (ref 150–450)
POTASSIUM SERPL-SCNC: 4 MMOL/L (ref 3.5–5.1)
PROTEIN (URINE DIPSTICK): 30 MG/DL
RBC # BLD AUTO: 4.25 X10(6)UL
SODIUM SERPL-SCNC: 141 MMOL/L (ref 136–145)
SPECIFIC GRAVITY: >=1.03 (ref 1–1.03)
TRIGL SERPL-MCNC: 276 MG/DL (ref 30–149)
TSI SER-ACNC: 3.95 MIU/ML (ref 0.36–3.74)
UROBILINOGEN,SEMI-QN: 0.2 MG/DL (ref 0–1.9)
VLDLC SERPL CALC-MCNC: 41 MG/DL (ref 0–30)
WBC # BLD AUTO: 6.9 X10(3) UL (ref 4–11)

## 2021-08-02 PROCEDURE — 84443 ASSAY THYROID STIM HORMONE: CPT | Performed by: INTERNAL MEDICINE

## 2021-08-02 PROCEDURE — 80053 COMPREHEN METABOLIC PANEL: CPT | Performed by: INTERNAL MEDICINE

## 2021-08-02 PROCEDURE — 99213 OFFICE O/P EST LOW 20 MIN: CPT | Performed by: UROLOGY

## 2021-08-02 PROCEDURE — 85025 COMPLETE CBC W/AUTO DIFF WBC: CPT | Performed by: INTERNAL MEDICINE

## 2021-08-02 PROCEDURE — 36415 COLL VENOUS BLD VENIPUNCTURE: CPT | Performed by: INTERNAL MEDICINE

## 2021-08-02 PROCEDURE — 51798 US URINE CAPACITY MEASURE: CPT | Performed by: UROLOGY

## 2021-08-02 PROCEDURE — 80061 LIPID PANEL: CPT | Performed by: INTERNAL MEDICINE

## 2021-08-02 PROCEDURE — 83036 HEMOGLOBIN GLYCOSYLATED A1C: CPT | Performed by: INTERNAL MEDICINE

## 2021-08-02 PROCEDURE — 81003 URINALYSIS AUTO W/O SCOPE: CPT | Performed by: UROLOGY

## 2021-08-02 NOTE — PROGRESS NOTES
Rooming Clinician:     Karson Luevano is a 64year old male. Patient presents with: Follow - Up: s/p TURP 3/8/21        HPI:     Patient returns for follow-up. He had a TURP several months ago. He has been urinating. Stream is slow.   Bladder scan bowel habits    • Morbid obesity with BMI of 40.0-44.9, adult (HCC)    • Night sweats    • Obesity    • Problems with swallowing     congestion in throat, ears.  (has seen ENT, no dx)   • Shortness of breath     increased over past two years   • Visual impa Comment: occasional    Drug use: No       REVIEW OF SYSTEMS:     GENERAL HEALTH: feels well otherwise  SKIN: denies any unusual skin lesions or rashes  RESPIRATORY: denies shortness of breath with exertion  CARDIOVASCULAR: denies chest pain on exertion  GI

## 2021-08-04 NOTE — TELEPHONE ENCOUNTER
RN called patient to relay MD's message and recommendations. Spoke to wife. Verbalized understanding and agreeable to plans. Delfina Parr MD  [facility] Clinical Staff             Your recent PSA is abnormal.  Recommend follow up office.        
108

## 2021-08-18 ENCOUNTER — TELEPHONE (OUTPATIENT)
Dept: FAMILY MEDICINE CLINIC | Facility: CLINIC | Age: 76
End: 2021-08-18

## 2021-08-18 NOTE — TELEPHONE ENCOUNTER
Blood work looks pretty darn good. Everything seems to be improving in general.  We are due for a Medicare annual visit. I wanted to put a little time between her last visit in the next month.   I think seeing sometime in mid fall such as October might be

## 2021-08-18 NOTE — TELEPHONE ENCOUNTER
Pt states our office was supposed to call him back regarding his lab results that Dr. Lluvia Fajardo order. Pt states that Dr. Matti Esteban also wanted to do a follow up appointment about results but he has not heard anything for almost a week.  Asked pt if Dr. Lesa Lara

## 2021-08-19 ENCOUNTER — TELEPHONE (OUTPATIENT)
Dept: SURGERY | Facility: CLINIC | Age: 76
End: 2021-08-19

## 2021-09-08 ENCOUNTER — TELEPHONE (OUTPATIENT)
Dept: FAMILY MEDICINE CLINIC | Facility: CLINIC | Age: 76
End: 2021-09-08

## 2021-09-08 NOTE — TELEPHONE ENCOUNTER
NGA with wife for patient to schedule his MA Supervisit with Dr. Jacky Desai. He asked her to tell us he will call us back.

## 2021-09-08 NOTE — TELEPHONE ENCOUNTER
Please enter lab orders for the patient's upcoming physical appointment. Physical scheduled: Your appointments     Date & Time Appointment Department [facility])    Oct 12, 2021  2:00 PM CDT MA Supervisit with Kath Zaidi  [address]  ([address]  [address])            [address] [place] [address],  [address] [place] [address] [phone]         [facility])     Please notify patient once orders are placed.

## 2021-10-01 ENCOUNTER — TELEPHONE (OUTPATIENT)
Dept: FAMILY MEDICINE CLINIC | Facility: CLINIC | Age: 76
End: 2021-10-01

## 2021-10-22 DIAGNOSIS — G47.01 INSOMNIA DUE TO MEDICAL CONDITION: ICD-10-CM

## 2021-10-22 DIAGNOSIS — F43.10 PTSD (POST-TRAUMATIC STRESS DISORDER): ICD-10-CM

## 2021-10-22 DIAGNOSIS — G47.00 INSOMNIA, UNSPECIFIED TYPE: ICD-10-CM

## 2021-10-25 ENCOUNTER — TELEPHONE (OUTPATIENT)
Dept: FAMILY MEDICINE CLINIC | Facility: CLINIC | Age: 76
End: 2021-10-25

## 2021-10-25 DIAGNOSIS — G47.00 INSOMNIA, UNSPECIFIED TYPE: ICD-10-CM

## 2021-10-25 DIAGNOSIS — G47.01 INSOMNIA DUE TO MEDICAL CONDITION: ICD-10-CM

## 2021-10-25 DIAGNOSIS — F43.10 PTSD (POST-TRAUMATIC STRESS DISORDER): ICD-10-CM

## 2021-10-25 RX ORDER — ALPRAZOLAM 1 MG/1
1 TABLET ORAL 2 TIMES DAILY PRN
Qty: 60 TABLET | Refills: 5 | Status: SHIPPED | OUTPATIENT
Start: 2021-10-25

## 2021-10-25 RX ORDER — DIAZEPAM 5 MG/1
5 TABLET ORAL NIGHTLY PRN
Qty: 30 TABLET | Refills: 5 | Status: SHIPPED | OUTPATIENT
Start: 2021-10-25

## 2021-10-25 NOTE — TELEPHONE ENCOUNTER
Patient is calling is he supposed to continue taking the 81 mg of aspirin. Pharmacy is asking him. Please ask Dr. Betancur Dates and call back today.

## 2021-10-25 NOTE — TELEPHONE ENCOUNTER
Patient notified. Pt states pharmacy told him alprazolam and diazepam scripts are  and new ones are required. Scripts pended.     Routed to Dr. Jeff Alvarenga

## 2021-10-25 NOTE — TELEPHONE ENCOUNTER
Pt only wants to see Dr. Jeff Alvarenga and he is not available until 12/6/21 he does not want to wait that long and asked me to ask Dr. Jeff Alvarenga if he can fit him in. Please advise.

## 2021-10-29 RX ORDER — ALPRAZOLAM 1 MG/1
TABLET ORAL
Qty: 60 TABLET | Refills: 0 | OUTPATIENT
Start: 2021-10-29

## 2021-10-29 RX ORDER — DIAZEPAM 5 MG/1
TABLET ORAL
Qty: 30 TABLET | Refills: 0 | OUTPATIENT
Start: 2021-10-29

## 2021-11-08 ENCOUNTER — TELEPHONE (OUTPATIENT)
Dept: FAMILY MEDICINE CLINIC | Facility: CLINIC | Age: 76
End: 2021-11-08

## 2021-11-08 NOTE — TELEPHONE ENCOUNTER
Patient no showed appointment 11/08/21.  Called patient and left message on machine informing him of no show and $40 no show fee, asked he contact office to reschedule

## 2021-11-14 NOTE — TELEPHONE ENCOUNTER
Patient is calling he has a terrible head cold and wants to only see Dr. Lester Vivar I told him we have nothing available until 7/30/19. 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 Lester Vivar.   He wan declines

## 2022-02-08 NOTE — TELEPHONE ENCOUNTER
Pt calling to check on status of refill. Pt will be running out in a couple days.  Please send refill to [facility] on [address] [facility]

## 2022-02-09 RX ORDER — DIAZEPAM 5 MG/1
TABLET ORAL
Qty: 30 TABLET | Refills: 0 | OUTPATIENT
Start: 2022-02-09

## 2022-02-09 RX ORDER — ALPRAZOLAM 1 MG/1
TABLET ORAL
Qty: 60 TABLET | Refills: 0 | OUTPATIENT
Start: 2022-02-09

## 2022-02-09 NOTE — TELEPHONE ENCOUNTER
LM for patient that because this is a controlled medication an appt has to be made before it will be refilled.  He is overdue for his MA Supervisit

## 2022-02-24 ENCOUNTER — TELEPHONE (OUTPATIENT)
Dept: SURGERY | Facility: CLINIC | Age: 77
End: 2022-02-24

## 2022-02-24 RX ORDER — TAMSULOSIN HYDROCHLORIDE 0.4 MG/1
CAPSULE ORAL
Qty: 180 CAPSULE | Refills: 3 | Status: SHIPPED | OUTPATIENT
Start: 2022-02-24

## 2022-04-06 ENCOUNTER — TELEPHONE (OUTPATIENT)
Dept: FAMILY MEDICINE CLINIC | Facility: CLINIC | Age: 77
End: 2022-04-06

## 2022-04-06 NOTE — PAT NURSING NOTE
Cardiology Consultation    Patient Name: Jaycob Paris  : 1938  PCP: Cleve Dumont MD      Reason for Consultation:  This is an 84-year-old male referred for preprocedure cardiac evaluation for bronchoscopy    History of Present Illness:    This is an 84-year-old male who was admitted with generalized weakness and failure to thrive and found to have bronchogenic mass on the left side and the plan is to do a bronchoscopy  Patient denies any chest pain  Past history significant for lymphoma hyperlipidemia hypertension COPD asthma hypothyroidism  Current medications include Synthroid Toprol-XL IV antibiotics Lipitor  Quit smoking 2 years back and before that was smoking 1 pack cigarettes daily  No known allergies      Medications:  Prior to Admission medications    Medication Sig Start Date End Date Taking? Authorizing Provider   hydrOXYzine (ATARAX) 10 MG tablet Take 10 mg by mouth at bedtime as needed for Itching.   Yes Outside Provider   cetirizine (ZyrTEC) 10 MG tablet Take 10 mg by mouth daily.   Yes Outside Provider   clobetasol 0.05 % shampoo Apply topically 3 days a week. Apply to scalp   Yes Outside Provider   clobetasol (TEMOVATE) 0.05 % topical solution Apply topically as directed. Apply to back of neck once a day   Yes Outside Provider   Pyrithione Zinc 2 % Bar Apply topically daily.   Yes Outside Provider   diphenhydrAMINE HCl 2 % Solution    Yes Outside Provider   atorvastatin (LIPITOR) 40 MG tablet Take 40 mg by mouth daily.   Yes Outside Provider   levothyroxine 112 MCG tablet Take 112 mcg by mouth daily.   Yes Outside Provider   folic acid (FOLATE) 1 MG tablet Take 1 mg by mouth daily.   Yes Outside Provider   metoPROLOL succinate (TOPROL-XL) 100 MG 24 hr tablet Take 100 mg by mouth daily.   Yes Outside Provider   diphenhydrAMINE-zinc acetate (BENADRYL) 2-0.1 % cream Apply topically 3 times daily as needed for Itching.    Outside Provider       Current Facility-Administered Medications  Confirmed with Blood bank that sample is at [facility], Requisition tubed to lab,   Medication Dose Route Frequency Provider Last Rate Last Admin   • diphenhydrAMINE (BENADRYL) 2-0.1 % Liquid 1 application  1 application Topical TID PRN Terry English   1 application at 04/06/22 0805   • atorvastatin (LIPITOR) tablet 40 mg  40 mg Oral Daily Terry English   40 mg at 04/06/22 0808   • folic acid (FOLATE) tablet 1 mg  1 mg Oral Daily Terry English   1 mg at 04/06/22 0809   • levothyroxine (SYNTHROID, LEVOTHROID) tablet 112 mcg  112 mcg Oral Daily Terry English   112 mcg at 04/06/22 0809   • metoPROLOL succinate (TOPROL-XL) ER tablet 100 mg  100 mg Oral Daily Terry English   100 mg at 04/06/22 0809   • hydrOXYzine (ATARAX) tablet 10 mg  10 mg Oral Q6H PRN Terry English   10 mg at 04/06/22 0819   • sodium chloride 0.9 % flush bag 25 mL  25 mL Intravenous PRN Terry English       • sodium chloride (PF) 0.9 % injection 2 mL  2 mL Intracatheter 2 times per day Terry English   2 mL at 04/06/22 0809   • [Held by provider] enoxaparin (LOVENOX) injection 40 mg  40 mg Subcutaneous Daily Terry English   40 mg at 04/05/22 0839   • lactated ringers infusion   Intravenous Continuous Terry English 75 mL/hr at 04/06/22 1301 New Bag at 04/06/22 1301   • sodium chloride (NORMAL SALINE) 0.9 % bolus 500 mL  500 mL Intravenous PRN Terry English       • cefTRIAXone (ROCEPHIN) syringe 1,000 mg  1 g Intravenous Daily Grady Sanchez DO   1,000 mg at 04/06/22 0809       Review of Systems:    No complaints of chest pain or any fever in the recent days    Physical Exam:    Awake alert blood pressure 109/68 JVP normal auscultation of the heart S1-S2 normal Gr 2/6 EDELMIRA  lungs rhonchi abdomen soft no pedal edema  EKG shows sinus tachycardia occasional supraventricular and ventricular premature complexes with left ventricle hypertrophy  Troponin 16 ng/L hemoglobin 8.9 potassium 3.8 creatinine 0.86  2D echo  pending    Vitals with min/max:        Vital Last Value 24 Hour Range   Temperature 97.9 °F (36.6 °C) (04/06/22 1356) Temp  Min: 97.7 °F (36.5 °C)  Max: 99.5 °F (37.5 °C)   Pulse 78 (04/06/22 1356) Pulse  Min: 74  Max: 87   Respiratory 18 (04/06/22 1356) Resp  Min: 16  Max: 18   Non-Invasive  Blood Pressure 109/68 (04/06/22 1356) BP  Min: 104/60  Max: 135/85   Pulse Oximetry 96 % (04/06/22 1356) SpO2  Min: 94 %  Max: 99 %   Arterial   Blood Pressure   No data recorded       Intake/Output Summary (Last 24 hours) at 4/6/2022 1426  Last data filed at 4/6/2022 1035  Gross per 24 hour   Intake 705 ml   Output 1050 ml   Net -345 ml        Labs:  Recent Results (from the past 24 hour(s))   Prothrombin Time    Collection Time: 04/05/22  3:34 PM   Result Value Ref Range    Prothrombin Time 12.0 (H) 9.7 - 11.8 sec    INR 1.1     Partial Thromboplastin Time    Collection Time: 04/05/22  3:34 PM   Result Value Ref Range    PTT 26 22 - 30 sec   Gold Top    Collection Time: 04/05/22  3:41 PM   Result Value Ref Range    Extra Tube Hold for Add Ons    Magnesium    Collection Time: 04/06/22  4:49 AM   Result Value Ref Range    Magnesium 1.5 (L) 1.7 - 2.4 mg/dL   Basic Metabolic Panel    Collection Time: 04/06/22  4:49 AM   Result Value Ref Range    Fasting Status      Sodium 138 135 - 145 mmol/L    Potassium 3.8 3.4 - 5.1 mmol/L    Chloride 103 98 - 107 mmol/L    Carbon Dioxide 31 21 - 32 mmol/L    Anion Gap 8 (L) 10 - 20 mmol/L    Glucose 85 70 - 99 mg/dL    BUN 14 6 - 20 mg/dL    Creatinine 0.86 0.67 - 1.17 mg/dL    Glomerular Filtration Rate 79 >=60    BUN/ Creatinine Ratio 16 7 - 25    Calcium 9.1 8.4 - 10.2 mg/dL   CBC with Automated Differential (performable only)    Collection Time: 04/06/22  4:49 AM   Result Value Ref Range    WBC 6.3 4.2 - 11.0 K/mcL    RBC 3.42 (L) 4.50 - 5.90 mil/mcL    HGB 8.9 (L) 13.0 - 17.0 g/dL    HCT 28.7 (L) 39.0 - 51.0 %    MCV 83.9 78.0 - 100.0 fl    MCH 26.0 26.0 - 34.0 pg    MCHC 31.0 (L) 32.0 -  36.5 g/dL    RDW-CV 19.0 (H) 11.0 - 15.0 %    RDW-SD 57.7 (H) 39.0 - 50.0 fL     140 - 450 K/mcL    NRBC 0 <=0 /100 WBC   Manual Differential    Collection Time: 04/06/22  4:49 AM   Result Value Ref Range    Neutrophil, Percent 57 %    Lymphocytes, Percent 13 %    Mono, Percent 12 %    Eosinophils, Percent 16 %    Basophils, Percent 0 %    Myelocytes, Percent 2 (H) <=0 %    Absolute Neutrophil 3.6 1.8 - 7.7 K/mcL    Absolute Lymphocytes 0.8 (L) 1.0 - 4.0 K/mcL    Absolute Monocytes 0.8 0.3 - 0.9 K/mcL    Absolute Eosinophils 1.0 (H) 0.0 - 0.5 K/mcL    Absolute Basophils 0.0 0.0 - 0.3 K/mcL    Ovalocytes Few    Lactate Dehydrogenase    Collection Time: 04/06/22  4:49 AM   Result Value Ref Range    LD, Total 407 (H) 86 - 234 Units/L       Data:  CT ABDOMEN PELVIS W CONTRAST  Narrative: EXAM:   CT ABDOMEN PELVIS W CONTRAST    CLINICAL INDICATION: Lymphoma.     COMPARISON:  June 14, 2016    TECHNIQUE:  Helical CT images through the abdomen and pelvis after IV  infusion of 80 mL Omnipaque 350. Multiplanar reconstructions. Automated  exposure control employed as radiation dose reduction strategy on this  patient.    FINDINGS:     LOWER CHEST: Small left pleural effusion. Multiple lobulated nodules at the  lung bases, the largest in the posterior right lung base measuring 13 mm.  LIVER: Decreased hepatic attenuation  BILIARY TREE AND GALLBLADDER: Soft tissue density nodular lesion in the  gallbladder towards the neck measures up to 14 mm.  SPLEEN: Heterogenous vague hypodensities throughout the spleen. Additional  calcified splenic granulomas.  PANCREAS: 2.3 cm low-density lesion along the pancreatic neck and another 5  mm lesion along the pancreatic tail.  ADRENAL GLANDS:Normal  KIDNEYS AND URETERS:Normal  STOMACH/BOWEL: Extensive colonic diverticulosis.  APPENDIX: Normal  BLADDER: Normal  REPRODUCTIVE ORGANS: Enlarged prostate with multiple surgical clips.  PERITONEUM AND RETROPERITONEUM: Normal  LYMPH NODES:  Interval enlargement of multiple lymph nodes since prior  examination. These are most notable in the retroperitoneum. Largest lymph  node is a left para-aortic lymph node near the left renal vein level  measuring 5.0 x 3.5 cm. Another large lymph node anterior to the aorta and  IVC measures 3.4 x 1.9 cm (2/81). Another retroaortic lymph node just above  the bifurcation measures 2.0 x 3.0 cm. Enlarged gastrohepatic ligament  lymph nodes measure up to 3.2 x 2.3 cm. Prominent right groin lymph node  measures 2.8 x 1.2 cm. Retrocrural lymph nodes measure up to 1.6 cm in  greatest dimension.  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  BODY WALL: Mild diffuse body wall edema.  BONY STRUCTURES:No acute findings  Impression: 1.    Interval development of multiple large retroperitoneal, gastrohepatic  ligament, retrocrural and right inguinal lymph nodes as above, presumably  reflecting lymphoma.   2.    Hypodense lesions throughout the spleen also presumably splenic  lymphoma.   3.    New soft tissue density nodular lesion within the gallbladder  suspicious for gallbladder mass. Advise dedicated right upper quadrant  ultrasound for further evaluation.  4.   Few low-density pancreatic lesions warrant continued annual follow-up.    Electronically Signed by: REG MUÑOZ M.D.   Signed on: 4/6/2022 1:53 PM       Encounter Date: 04/04/22   Electrocardiogram 12-Lead   Result Value    Ventricular Rate EKG/Min (BPM) 116    Atrial Rate (BPM) 116    MT-Interval (MSEC) 162    QRS-Interval (MSEC) 76    QT-Interval (MSEC) 320    QTc 444    P Axis (Degrees) 37    R Axis (Degrees) -10    T Axis (Degrees) 65    REPORT TEXT      Sinus tachycardia  with  premature supraventricular complexes  and  with occasional  premature ventricular complexes  Left ventricular hypertrophy  with repolarization abnormality  Inferior infarct  , age  undetermined  Abnormal ECG  No previous ECGs available             Assessment/Plan:     Clinical Impression  1.  Left bronchogenic mass  2.  History of lymphoma  3.  Hyperlipidemia  4.  Hypothyroidism  5.  Hypertension controlled  6.  COPD and asthma by history  7. Possible Aortic Stenosis        Recommendations:    Dietary modification advised  2D echo pending  If 2D echo shows normal left ventricular systolic function patient is cleared for bronchoscopy with low risk from cardiac standpoint  Will reevaluate patient tomorrow after the 2D echo  Thanks for this consultation    Blane Michelle MD

## 2022-04-06 NOTE — TELEPHONE ENCOUNTER
Called patient to try to schedule him for his MA Supervisit with Dr. Stephanie Alonso and His wife told me that he passed away 2 days ago. Please send a sympathy card and let Dr. Stephanie Alonso know.

## 2022-07-18 NOTE — TELEPHONE ENCOUNTER
Copied from note from the [facility] psychology team, who has been working to get the patient referrals:    On 2/1, the patient stated that he did not receive any referrals from the assessment.  When  I spoke to [facility] the specialist who had completed the a Pt was seen today for ruptured tympanic membrane. She is wondering what can be taken for pain for this - please advise.

## 2024-04-18 NOTE — TELEPHONE ENCOUNTER
Dominick,physical therapist with [facility] stating that pt does not want to get out of bed. He discussed it with his supervisor and they agree that pt's problems stem from depression and PTSD.     They feel that pt should be seen by a home health agency that
He has traditionally declined all psychiatric care, as he is afraid it will undermine his disability he is getting. You would have to run it by the patient. If he is agreeable, that is fine.
Left message for FRANC- Carmen Garcia to call back
Talked to Layne Iniguez PT and gave OK to bring in  to work with the patient
Yumiko Cintron from [place] [address] feels he should be with a [address] [facility] service that has psych nurse. He is depressed and has PTSD doesn't want to get out of bed.
[FreeTextEntry6] : Cut self in school bathroom yesterday afternoon approx 1400; says cut self on lock of bathroom door.   Presented to [facility] 4/17 for bleeding abrasion; wound cleaned and bandage applied. When returned home, noted swelling, erythema, tenderness around abrasion; mom, who is a doctor, cleaned wound again with "brown solution", and rebandaged with wrap. Tenderness with any contact and/or weightbearing has been interfering with sleep, other activities. No antibiotics since injury. Has not been taking pain medications at home including Tylenol, Motrin.  PMH: NEMO (iron PO, but has not taken recently), ?sexually active (Depo) PSH: denies Allergies: denies  Had bad cold March-April 2024. Denies current fever, nasal congestion, rhinorrhea, cough.

## 2024-11-18 NOTE — TELEPHONE ENCOUNTER
Med ordered as phone in. Chronic meds.
Pt notified of refills phoned in to his pharmacy.  Pt is now asking since the Flurazepam does not come in Tablets, for now Pt will open up the capsule and take like this until all of medicine is gone, then for future Pt would like a alternative to be order
Pt notified.
Pt states Nuha surgeon/Nutritionist/ Pharmacist have all given their approval today at his appt that Pt can stay on Flurazepam capsules, and just break them open.
TC from patient he is having difficulty getting this filled. He states he has called us twice already today. I am not seeing this but he just had bariatric surgery and can't take the Flurazepam in capsule form it has to be tablets due to his surgery.   Lindsey Solomon
There is no direct alternative. What I might propose if when this is running near the end, we meet and look at the other benzo characteristics and try to find a best alternative. So aim to meet in about 3-4 weeks.
27.8

## 2025-03-13 NOTE — PROGRESS NOTES
HISTORY OF PRESENT ILLNESS  Patient presents with:  Weight Problem: post op June 9th 2018    Shaq Pinedo is a 74year old male new to our office today for initiation of medical weight loss program.  Patient presents today with c/o excess weight.  Ref no apparent distress, obese  SKIN: warm, pink, dry without rashes to exposed area, slightly pale  EYES: conjunctiva pink, sclera non icteric, PERRL  HEENT: atraumatic, normocephalic, O/p: Mallampati score- 2  NECK: supple, non tender, no adenopathy, no thy Visit:   ALPRAZOLAM 1 MG Oral Tab TAKE 1 TABLET BY MOUTH TWICE DAILY AS NEEDED FOR SLEEP Disp: 60 tablet Rfl: 0   FLURAZEPAM HCL 30 MG Oral Cap TAKE ONE CAPSULE BY MOUTH EVERY NIGHT AT BEDTIME AS NEEDED FOR SLEEP Disp: 30 capsule Rfl: 0   HYDROcodone-acetam that he would like him to try (over the counter)     Discussed:  · Counseled on comprehensive weight loss plan including attention to nutrition, exercise and behavior/stress management for success.  See patient instruction below for more details.  -low carb What Is The Reason For Today's Visit?: Preventative Skin Check

## (undated) DIAGNOSIS — G47.01 INSOMNIA DUE TO MEDICAL CONDITION: ICD-10-CM

## (undated) DIAGNOSIS — F43.10 PTSD (POST-TRAUMATIC STRESS DISORDER): ICD-10-CM

## (undated) DIAGNOSIS — N40.1 BPH WITH OBSTRUCTION/LOWER URINARY TRACT SYMPTOMS: ICD-10-CM

## (undated) DIAGNOSIS — G47.00 INSOMNIA, UNSPECIFIED TYPE: ICD-10-CM

## (undated) DIAGNOSIS — N13.8 BPH WITH OBSTRUCTION/LOWER URINARY TRACT SYMPTOMS: ICD-10-CM

## (undated) DEVICE — KIT SLEEVE BARIATRIC BYPASS

## (undated) DEVICE — STERILE POLYISOPRENE POWDER-FREE SURGICAL GLOVES: Brand: PROTEXIS

## (undated) DEVICE — PLASTC TOOMEY SYRNG DISP

## (undated) DEVICE — SOL  .9 3000ML

## (undated) DEVICE — REM POLYHESIVE ADULT PATIENT RETURN ELECTRODE: Brand: VALLEYLAB

## (undated) DEVICE — TROCAR: Brand: KII® SLEEVE

## (undated) DEVICE — Device

## (undated) DEVICE — SUTURE PDS II 1 CT-1

## (undated) DEVICE — FLUID JUMPSUIT DISP SD-100

## (undated) DEVICE — Device: Brand: DEFENDO AIR/WATER/SUCTION AND BIOPSY VALVE

## (undated) DEVICE — URINE DRAINAGE BAG,BAG, NEEDLE SAMPLING, DRAIN TUBE: Brand: DOVER

## (undated) DEVICE — FILTERLINE NASAL ADULT O2/CO2

## (undated) DEVICE — SYRINGE 30ML LL TIP

## (undated) DEVICE — 1071 S-DRP URO STLE-GAMA 10/BX,4X/C: Brand: STERI-DRAPE™

## (undated) DEVICE — PROXIMATE SKIN STAPLERS (35 WIDE) CONTAINS 35 STAINLESS STEEL STAPLES (FIXED HEAD): Brand: PROXIMATE

## (undated) DEVICE — SOL  .9 1000ML BTL

## (undated) DEVICE — DRAPE SHEET LG

## (undated) DEVICE — VISIGI 3D®  CALIBRATION SYSTEM  SIZE 40FR STD W/ BULB: Brand: BOEHRINGER® VISIGI 3D™ SLEEVE GASTRECTOMY CALIBRATION SYSTEM, SIZE 40FR W/BULB

## (undated) DEVICE — UNDYED BRAIDED (POLYGLACTIN 910), SYNTHETIC ABSORBABLE SUTURE: Brand: COATED VICRYL

## (undated) DEVICE — DERMABOND LIQUID ADHESIVE

## (undated) DEVICE — 3M™ RED DOT™ MONITORING ELECTRODE WITH FOAM TAPE AND STICKY GEL, 50/BAG, 20/CASE, 72/PLT 2570: Brand: RED DOT™

## (undated) DEVICE — LAP CHOLE: Brand: MEDLINE INDUSTRIES, INC.

## (undated) DEVICE — FORCEP BIOPSY RJ4 LG CAP W/ND

## (undated) DEVICE — 1200CC GUARDIAN II: Brand: GUARDIAN

## (undated) DEVICE — 3M™ STERI-DRAPE™ INSTRUMENT POUCH 1018L: Brand: STERI-DRAPE™

## (undated) DEVICE — SEAM GUARD BLACK

## (undated) DEVICE — SEAM GUARD PURPLE

## (undated) DEVICE — [HIGH FLOW INSUFFLATOR,  DO NOT USE IF PACKAGE IS DAMAGED,  KEEP DRY,  KEEP AWAY FROM SUNLIGHT,  PROTECT FROM HEAT AND RADIOACTIVE SOURCES.]: Brand: PNEUMOSURE

## (undated) DEVICE — TROCAR: Brand: KII FIOS FIRST ENTRY

## (undated) DEVICE — HOVERMATT 34IN SINGLE USE

## (undated) DEVICE — LIGASURE LAP MARYLAND 37CM

## (undated) DEVICE — STERILE LATEX POWDER-FREE SURGICAL GLOVESWITH NITRILE COATING: Brand: PROTEXIS

## (undated) DEVICE — HF-RESECTION ELECTRODE PLASMALOOP LOOP, LARGE, 24 FR., 12°/16°, ESG TURIS: Brand: OLYMPUS

## (undated) DEVICE — HF-RESECTION ELECTRODE PLASMA-OVALBUTTON BUTTON, OVAL, 24 FR., 12°-30°, ESG TURIS: Brand: OLYMPUS

## (undated) DEVICE — KENDALL SCD EXPRESS SLEEVES, KNEE LENGTH, MEDIUM: Brand: KENDALL SCD

## (undated) DEVICE — CYSTO CDS-LF: Brand: MEDLINE INDUSTRIES, INC.

## (undated) DEVICE — SOL H2O 1000ML BTL

## (undated) DEVICE — TROCARS: Brand: KII® OPTICAL ACCESS SYSTEM

## (undated) DEVICE — ENDOSCOPY PACK UPPER: Brand: MEDLINE INDUSTRIES, INC.

## (undated) DEVICE — SUTURE PASSOR WITH GUIDE

## (undated) DEVICE — COVER,BOOT,FOAM,NON-SKID,HOOK-LOOP,XLG: Brand: MEDLINE INDUSTRIES, INC.

## (undated) NOTE — ED AVS SNAPSHOT
Mayra Garcia   MRN: [id number]    Department:  [facility] Emergency Department   Date of Visit:  12/28/2018           Disclosure     Insurance plans vary and the physician(s) referred by the ER may not be covered by your plan.  Please contact tell this physician (or your personal doctor if your instructions are to return to your personal doctor) about any new or lasting problems. The primary care or specialist physician will see patients referred from the [facility] Emergency Department.  Severo Pastures

## (undated) NOTE — LETTER
20    Patient: Senthil Harrington  : 1945 Visit date: 2020    Dear  Courtney Little MD    Thank you for referring Senthil Harrington to my practice. Please find my assessment and plan below.     Assessment   Acute posterior anal fissure patient's symptoms. I have asked him to remain on the Metamucil 1 tablespoon twice daily. He will go to a half a dose of MiraLAX per day. We have helped the patient obtain his medication for the anal fissure.   He will apply nitroglycerin ointment 3 ti

## (undated) NOTE — Clinical Note
Please work on PA for high res CT and call pt when able to schedule.  BAUDILIO Stanley Rheumatology7/29/2020

## (undated) NOTE — LETTER
Date: 2019    Patient Name: Chela Steven  : 1945  Date of injury: 1983     To: Office of [address] [place] [address], [facility]           To Whom it may concern:     Mr. [address] Ct has been under tr

## (undated) NOTE — MR AVS SNAPSHOT
[facility] [email or web address] [place] [id number] [phone]               Thank you for choosing us for your health care visit with Chloe Guajardo MD.  We are glad to serve you and happy to provide you with this summary of you Take 1 tablet (40 mg total) by mouth every morning before breakfast.  Commonly known as:  Cate Meza 26                 [facility]                Visit [facility] online at  [email or web address]

## (undated) NOTE — MR AVS SNAPSHOT
[address] [facility] [address]  [facility],  [address]  [address] [place] [address] [phone]               Thank you for choosing us for your health care visit with Reddy Johnson MD.  We are glad to serve you and happy to provide you with this s Take 1 capsule (30 mg total) by mouth nightly as needed for Sleep. Commonly known as:  Emi Davalos OR   as needed. MULTIVITAMIN & MINERAL OR   Take by mouth daily.            topiramate 25 MG Tabs   Take 1 tablet (25 mg total) by m

## (undated) NOTE — MR AVS SNAPSHOT
[facility] [employer] [place] [address] [facility] [phone]               Thank you for choosing us for your health care visit with Jake Brown MD.  We are glad to serve you and happy to provide you with this summary of you information, go to [email or web address] and click on the Sign Up Now link in the [employer] box. Enter your [employer]. Activation Code exactly as it appears below along with your Zip Code and Date of Birth to complete the sign-up process.  If you do

## (undated) NOTE — LETTER
20    Patient: Delfina Ogden  : 1945 Visit date: 2020    Dear  Dr. Alcides King MD,    Thank you for referring Delfina Ogden to my practice. Please find my assessment and plan below.              Assessment   Acute posterior anal fiss The patient denies any first or second-degree family history of colon cancer or uterine cancer. The patient has a past surgical history significant for a laparoscopic gastric sleeve procedure.     The patient also had a MI with a stent placed in August 2

## (undated) NOTE — MR AVS SNAPSHOT
[address] [facility] [address]  [facility],  [address]  [address] [place] [address] [phone]               Thank you for choosing us for your health care visit with Pia Robert MD.  We are glad to serve you and happy to provide you with this s Take 1 capsule (30 mg total) by mouth nightly as needed for Sleep. Commonly known as:  Sandip Punches OR   as needed. MULTIVITAMIN & MINERAL OR   Take by mouth daily.                    MyChart                  Visit THAO PEREZ

## (undated) NOTE — MR AVS SNAPSHOT
[address] [facility] [address]  [facility],  [address]  [address] [place] [address] [phone]               Thank you for choosing us for your health care visit with Celine Vu MD.  We are glad to serve you and happy to provide you with this s authorization, such as [place], please feel free to schedule your appointment immediately. However, if you are unsure about the requirements for authorization, please wait 5-7 days and then contact your physician's office.  At that time, you will Linaclotide 145 MCG Caps   Take 1 tablet by mouth daily as needed.    What changed:    - how much to take  - when to take this  - reasons to take this   Commonly known as:  LINZESS           Losartan Potassium-HCTZ 50-12.5 MG Tabs   Take 1 tablet by mouth Dietary sodium reduction Reduce dietary sodium intake to <= 100 mmol per day (2.4 g sodium or 6 g sodium chloride)   Aerobic physical activity Regular aerobic physical activity (e.g., brisk walking, light jogging, cycling, swimming, etc.) for a goal of at

## (undated) NOTE — Clinical Note
HiDr. Barnhart Esther! Thank you for referring Mr. Karson Luevano for rheumatologic evaluation. Please see the discussion portion of my note and let me know if you have any questions.  BAUDILIO Pike Rheumatology7/29/2020

## (undated) NOTE — Clinical Note
I saw this patient, as a new patient to our office. Surgery with Dr. Mary Jo Miranda on 6/11/2018, 74 yrs olds. He was asking for over the counter pain medication that he can take. We tried to call your office and left message.  If you get this can you call the blanca

## (undated) NOTE — MR AVS SNAPSHOT
[address] [facility] [address]  [facility],  [address]  [address] [place] [address] [phone]               Thank you for choosing us for your health care visit with Bear Piper MD.  We are glad to serve you and happy to provide you with this s Today's Orders     Weight [facility] - [facility]    Complete by:  As directed    Assoc Dx:   Severe obesity (BMI 35.0-39.9) ([facility] [E66.01]                 Referral Details     Referred By    Referred To    Allyson Galicia MD   [address] [place] [address] chair/bench and a hand held shower head while bathing/showering. BEDROOM:  ? Place light switches within reach of your bed and a night light between the bedroom and bathroom. ? Get up slowly from lying down or sitting if you get dizzy. ?  Keep a working 150 minutes per week. Moderation of alcohol consumption Men: limit to <= 2 drinks* per day. Women and lighter weight persons: limit to <= 1 drink* per day.                            Visit [employer] online at  [email or web address]

## (undated) NOTE — MR AVS SNAPSHOT
[facility] [address] [place] [phone]               Thank you for choosing us for your health care visit with Concepcion Rider MD.  We are glad to serve you and happy to provide you with this summary of you

## (undated) NOTE — LETTER
Date: 2018  Patient Name:  Gopi Sharpe             Address: [address] [place] [address]    : 1945    Dear Mr. Gildardo Giron,     On your recent upper endoscopy, a routine biopsy from the stomach was normal.  Please feel free to ca

## (undated) NOTE — Clinical Note
FYI, TCM call made, see notes. NCM confirmed with patient that he has a HFU at the [facility] on 9/4/19. NCM changed visit type to TCM HFU.

## (undated) NOTE — MR AVS SNAPSHOT
[facility] [employer] [place] [phone]               Thank you for choosing us for your health care visit with Mackenzie Berry MD.  We are glad to serve you and happy to provide you with this summary of you

## (undated) NOTE — Clinical Note
Thank you for referring Rigo La to the [facility]. I met with him in consultation today. I have ordered labs, set up a nutrition consultation with our dietician.   He was started on Topamax for medication therapy and will follow-up with me

## (undated) NOTE — Clinical Note
Fyi, what do you recommend patient to take over the counter for pain. Surgery was on 6/11/2017.  thanks

## (undated) NOTE — MR AVS SNAPSHOT
[facility] [employer] [place] [phone]               Thank you for choosing us for your health care visit with Chloe Guajardo MD.  We are glad to serve you and happy to provide you with this summary of you

## (undated) NOTE — LETTER
[facility], [facility], [address] [facility], [address] [place] [address] Pkwy  [phone]          Date: 3/4/2021     Patient Name: Fco Shaw   :   1945   Date of Surgery:  3/8/2021      Dear Dr Chelle Shepard

## (undated) NOTE — Clinical Note
Lundy Romberg saw Neillinda Chaivra today. ... He is actually gaining weight since surgery (#5 lbs since June, 2018). .. He is meeting w/ maya this week but based on food log he brought me, it doesn't make sense.  Reinaldo Mulligan

## (undated) NOTE — MR AVS SNAPSHOT
[place] [id number]               Thank you for choosing us for your health care visit with ADEOLA Bunch.   We are glad to serve you and happy to provide you with this summar Insomnia, unspecified type   PTSD (post-traumatic stress disorder)   Order:  Pulmonary - Internal    Dinorah Peoples MD   [address] [place] [address] Road   Phone:  [phone]   Fax:  [phone]       Comment:  Hx of sleep study at look forward to working with you along this journey to better health! Next steps:     1.  Schedule a personal nutrition consultation with Zelalem Cheung, our registered dietician. Bring along your food journal (3 days minimum). See journal options below.   2.  Com Don't forget to schedule your 1 month follow-up visit!            Allergies as of Apr 26, 2017     No Known Allergies                Today's Vital Signs     BP Pulse Height Weight BMI    126/82 mmHg 80 69\" 274 lb 40.44 kg/m2         Current Medications dairy products with reduced content of saturated and total fat.    Dietary sodium reduction Reduce dietary sodium intake to <= 100 mmol per day (2.4 g sodium or 6 g sodium chloride)   Aerobic physical activity Regular aerobic physical activity (e.g., brisk

## (undated) NOTE — LETTER
Your patient was recently seen at the [facility] for a hospital follow-up visit. The visit note is attached. Please contact the clinic with any questions at [phone].     Thank you,  PREETI Kumar

## (undated) NOTE — LETTER
Date: 2021    Patient Name: Karson Luevano  : 1945  Date of injury: 1983     To: Office of [facility] [address] [place] [address], [facility]          To Whom it may concern:     Mr. Naseem Varner has been under alejandro

## (undated) NOTE — MR AVS SNAPSHOT
After Visit Summary   10/30/2018    Rc Gonzalez    MRN: [id number]           Visit Information     Date & Time  10/30/2018  9:30 AM Provider  Charlene Tijerina MD [address] [place] [address], [address] [place] [address] Dept.  Phone will not need to use this code after you have completed the sign-up process. If you do not sign up before the expiration date, you must request a new code. [employer] Activation Code: [email or web address]  Expires: 12/21/2018  8:24 AM    4.  Enter your Zip Code and Available at primary care offices      [facility]     Treatment for mild  illness or injury that does  not require immediate attention.           Average cost  $70*       VIDEO VISITS

## (undated) NOTE — Clinical Note
Good morning Dr. Odalys Rosenberg  I got an interested case in a patient of mine who was recently in the ED. New LULY, urinary retention and I don't think we have a lead on either. He has miller in place now and I am hoping he will be able to get this out.   Do you or

## (undated) NOTE — LETTER
Date: 2018  Patient Name:  Mena Hernandez             Address: [address] [place] [address]    : 1945    Dear Mr. Brenda Baugh,    Your liver biopsy reveals mild inflammation, and early scar tissue formation caused from fatty liver d

## (undated) NOTE — MR AVS SNAPSHOT
[facility] [employer] [place] [address] [phone]               Thank you for choosing us for your health care visit with Rafael Arriaga MD.  We are glad to serve you and happy to provide you with this summary of you Commonly known as:  [place] OR  as needed. MULTIVITAMIN & MINERAL OR  Take by mouth daily.         Neomycin-Polymyxin-HC [id number] Soln  Commonly known as:  CORTISPORIN                 MyChart                Visit THAO GARCIA

## (undated) NOTE — MR AVS SNAPSHOT
[address] [facility]  [facility],  [address]  [address] [place] [address] [phone]               Thank you for choosing us for your health care visit with Chris Escalera MD.  We are glad to serve you and happy to provide you with this s taking this medication, and follow the directions you see here. Atorvastatin Calcium 40 MG Tabs   Take 1 tablet (40 mg total) by mouth nightly.    What changed:    - how much to take  - how to take this  - when to take this   Commonly known as:  L Lipid Panel [E]    Complete by:  Jan 03, 2017 (Approximate)        CBC W Differential W Platelet [E]    Complete by:  Jan 03, 2017 (Approximate)        TSH W Reflex To Free T4 [E]    Complete by:  Jan 03, 2017 (Approximate)              Follow-up Instruct factor presence ([facility] [8764166]           S/P lumbar fusion [146746]  Rheumatoid arthritis, involving unspecified site, unspecified rheumatoid factor presence ([facility].) [[id number]]           Hyperlipidemia, unspecified hyperlipidemia type [2212043]           Saadia Siddiqui ? Use a non skid rubber mat in the tub/shower. ? If you are unsteady on your feet you may want to use a shower chair/bench and a hand held shower head while bathing/showering.   BEDROOM:  ? Place light switches within reach of your bed and a night light be Choose whole grain products Foods high in sodium   Water is best for hydration Fast food.    Eat at home when possible     Tips for increasing your physical activity – Adults who are physically active are less likely to develop some chronic diseases than ad

## (undated) NOTE — MR AVS SNAPSHOT
[facility] [employer] [place] [address] [facility] [address] [phone]               Thank you for choosing us for your health care visit with Lyudmila Duong MD.  We are glad to serve you and happy to provide you with this summary of you Take by mouth daily.                  MyChart                Visit [facility] online at  [email or web address]

## (undated) NOTE — LETTER
Date: 2018  Patient Name: Thelma Maravilla  : 1945  Date of injury: 1983    To: Office of [address] [place] [address], [facility]        To Whom it may concern:     This letter has been written at the [facility]

## (undated) NOTE — LETTER
[address] [facility], [address] [facility], [address] [address] [address] ,[address], [address] [facility]        2017    [place]   1945    [address] [place] [address]    Dear Nallely Anne,    In order to [facility]